# Patient Record
Sex: MALE | Race: WHITE | NOT HISPANIC OR LATINO | Employment: OTHER | URBAN - METROPOLITAN AREA
[De-identification: names, ages, dates, MRNs, and addresses within clinical notes are randomized per-mention and may not be internally consistent; named-entity substitution may affect disease eponyms.]

---

## 2017-06-02 ENCOUNTER — HOSPITAL ENCOUNTER (OUTPATIENT)
Dept: LAB | Facility: HOSPITAL | Age: 82
Discharge: HOME | End: 2017-06-02
Attending: RADIOLOGY | Admitting: RADIOLOGY

## 2017-06-02 LAB
PSA SERPL-MCNC: 0.01 NG/ML (ref 0–4)
TESTOST SERPL-MCNC: 3.51 NG/ML (ref 1.7–7.8)

## 2017-09-06 ENCOUNTER — HOSPITAL ENCOUNTER (OUTPATIENT)
Dept: OTHER | Facility: HOSPITAL | Age: 82
Setting detail: SPECIMEN
Discharge: HOME | End: 2017-09-06
Attending: FAMILY MEDICINE | Admitting: FAMILY MEDICINE

## 2017-12-01 ENCOUNTER — HOSPITAL ENCOUNTER (OUTPATIENT)
Dept: LAB | Facility: HOSPITAL | Age: 82
Discharge: HOME | End: 2017-12-01
Attending: RADIOLOGY | Admitting: RADIOLOGY

## 2017-12-01 LAB
PSA SERPL-MCNC: 0.01 NG/ML (ref 0–4)
TESTOST SERPL-MCNC: 4.7 NG/ML (ref 1.7–7.8)

## 2018-06-08 ENCOUNTER — HOSPITAL ENCOUNTER (OUTPATIENT)
Dept: LAB | Facility: HOSPITAL | Age: 83
Discharge: HOME | End: 2018-06-08
Attending: RADIOLOGY | Admitting: RADIOLOGY

## 2018-06-08 LAB
BASOPHILS # BLD AUTO: 0.1 10*3/UL (ref 0–0.2)
BASOPHILS NFR BLD AUTO: 1 % (ref 0–2)
CHOLEST SERPL-MCNC: 176 MG/DL
CHOLEST/HDLC SERPL: 3.1 {RATIO}
CONV LDL CHOLESTEROL DIRECT: 96 MG/DL (ref 0–100)
DIFFERENTIAL METHOD BLD: (no result)
EOSINOPHIL # BLD AUTO: 0.2 10*3/UL (ref 0–0.3)
EOSINOPHIL # BLD AUTO: 4 % (ref 0–3)
ERYTHROCYTE [DISTWIDTH] IN BLOOD BY AUTOMATED COUNT: 12.9 % (ref 11.5–14.5)
HCT VFR BLD AUTO: 46 % (ref 40–54)
HDLC SERPL-MCNC: 56 MG/DL
HGB BLD-MCNC: 15.8 G/DL (ref 14–18)
LDLC/HDLC SERPL: 1.7 {RATIO}
LIPID INTERPRETATION: ABNORMAL
LYMPHOCYTES # BLD AUTO: 0.9 10*3/UL (ref 0.8–4.8)
LYMPHOCYTES NFR BLD AUTO: 19 % (ref 18–42)
MAGNESIUM SERPL-MCNC: 2 MG/DL (ref 1.8–2.5)
MCH RBC QN AUTO: 32.2 PG (ref 26–32)
MCHC RBC AUTO-ENTMCNC: 34.3 G/DL (ref 32–36)
MCV RBC AUTO: 93.9 FL (ref 80–94)
MONOCYTES # BLD AUTO: 0.5 10*3/UL (ref 0.1–1.3)
MONOCYTES NFR BLD AUTO: 11 % (ref 2–11)
NEUTROPHILS # BLD AUTO: 3.1 10*3/UL (ref 2.3–8.6)
NEUTROPHILS NFR BLD AUTO: 65 % (ref 50–75)
NRBC BLD AUTO-RTO: 0 /100{WBCS}
NRBC/RBC NFR BLD MANUAL: 0 10*3/UL
PLATELET # BLD AUTO: 214 10*3/UL (ref 150–450)
PMV BLD AUTO: 7.6 FL (ref 7.4–10.4)
RBC # BLD AUTO: 4.9 10*6/UL (ref 4.6–6)
TRIGL SERPL-MCNC: 107 MG/DL
VLDLC SERPL CALC-MCNC: 23.6 MG/DL
WBC # BLD AUTO: 4.8 10*3/UL (ref 4.5–11.5)

## 2018-12-07 ENCOUNTER — HOSPITAL ENCOUNTER (OUTPATIENT)
Dept: LAB | Facility: HOSPITAL | Age: 83
Discharge: HOME | End: 2018-12-07
Attending: RADIOLOGY | Admitting: RADIOLOGY

## 2018-12-07 LAB
PSA SERPL-MCNC: 0.01 NG/ML (ref 0–4)
TESTOST SERPL-MCNC: 4.12 NG/ML (ref 1.7–7.8)

## 2019-04-01 ENCOUNTER — HOSPITAL ENCOUNTER (OUTPATIENT)
Dept: LAB | Facility: HOSPITAL | Age: 84
Discharge: HOME | End: 2019-04-01
Attending: FAMILY MEDICINE | Admitting: FAMILY MEDICINE

## 2019-04-01 LAB
ALBUMIN SERPL-MCNC: 3.8 G/DL (ref 3.5–4.8)
ALBUMIN/GLOB SERPL: 1.3 {RATIO} (ref 1–1.7)
ALP SERPL-CCNC: 57 IU/L (ref 32–91)
ALT SERPL-CCNC: 23 IU/L (ref 17–63)
ANION GAP SERPL CALC-SCNC: 14.4 MMOL/L (ref 10–20)
AST SERPL-CCNC: 23 IU/L (ref 15–41)
BASOPHILS # BLD AUTO: 0.1 10*3/UL (ref 0–0.2)
BASOPHILS NFR BLD AUTO: 2 % (ref 0–2)
BILIRUB SERPL-MCNC: 1.1 MG/DL (ref 0.3–1.2)
BUN SERPL-MCNC: 24 MG/DL (ref 8–20)
BUN/CREAT SERPL: 20 (ref 6.2–20.3)
CALCIUM SERPL-MCNC: 9.2 MG/DL (ref 8.9–10.3)
CHLORIDE SERPL-SCNC: 100 MMOL/L (ref 101–111)
CHOLEST SERPL-MCNC: 180 MG/DL
CHOLEST/HDLC SERPL: 3.2 {RATIO}
CONV CO2: 27 MMOL/L (ref 22–32)
CONV LDL CHOLESTEROL DIRECT: 113 MG/DL (ref 0–100)
CONV TOTAL PROTEIN: 6.8 G/DL (ref 6.1–7.9)
CREAT UR-MCNC: 1.2 MG/DL (ref 0.7–1.2)
DIFFERENTIAL METHOD BLD: (no result)
EOSINOPHIL # BLD AUTO: 0.1 10*3/UL (ref 0–0.3)
EOSINOPHIL # BLD AUTO: 3 % (ref 0–3)
ERYTHROCYTE [DISTWIDTH] IN BLOOD BY AUTOMATED COUNT: 13 % (ref 11.5–14.5)
GLOBULIN UR ELPH-MCNC: 3 G/DL (ref 2.5–3.8)
GLUCOSE SERPL-MCNC: 88 MG/DL (ref 65–99)
HCT VFR BLD AUTO: 48.5 % (ref 40–54)
HDLC SERPL-MCNC: 57 MG/DL
HGB BLD-MCNC: 16.7 G/DL (ref 14–18)
LDLC/HDLC SERPL: 2 {RATIO}
LIPID INTERPRETATION: ABNORMAL
LYMPHOCYTES # BLD AUTO: 1.1 10*3/UL (ref 0.8–4.8)
LYMPHOCYTES NFR BLD AUTO: 21 % (ref 18–42)
MCH RBC QN AUTO: 33 PG (ref 26–32)
MCHC RBC AUTO-ENTMCNC: 34.4 G/DL (ref 32–36)
MCV RBC AUTO: 95.8 FL (ref 80–94)
MONOCYTES # BLD AUTO: 0.5 10*3/UL (ref 0.1–1.3)
MONOCYTES NFR BLD AUTO: 10 % (ref 2–11)
NEUTROPHILS # BLD AUTO: 3.4 10*3/UL (ref 2.3–8.6)
NEUTROPHILS NFR BLD AUTO: 64 % (ref 50–75)
NRBC BLD AUTO-RTO: 0 /100{WBCS}
NRBC/RBC NFR BLD MANUAL: 0 10*3/UL
PLATELET # BLD AUTO: 247 10*3/UL (ref 150–450)
PMV BLD AUTO: 7.8 FL (ref 7.4–10.4)
POTASSIUM SERPL-SCNC: 4.4 MMOL/L (ref 3.6–5.1)
RBC # BLD AUTO: 5.07 10*6/UL (ref 4.6–6)
SODIUM SERPL-SCNC: 137 MMOL/L (ref 136–144)
TRIGL SERPL-MCNC: 112 MG/DL
VLDLC SERPL CALC-MCNC: 10.2 MG/DL
WBC # BLD AUTO: 5.2 10*3/UL (ref 4.5–11.5)

## 2019-05-28 ENCOUNTER — CONVERSION ENCOUNTER (OUTPATIENT)
Dept: CARDIOLOGY | Facility: CLINIC | Age: 84
End: 2019-05-28

## 2019-06-01 ENCOUNTER — TRANSCRIBE ORDERS (OUTPATIENT)
Dept: ADMINISTRATIVE | Facility: HOSPITAL | Age: 84
End: 2019-06-01

## 2019-06-01 DIAGNOSIS — Z13.6 ENCOUNTER FOR SCREENING FOR VASCULAR DISEASE: Primary | ICD-10-CM

## 2019-06-04 VITALS
BODY MASS INDEX: 23.55 KG/M2 | HEART RATE: 60 BPM | SYSTOLIC BLOOD PRESSURE: 137 MMHG | WEIGHT: 141.5 LBS | OXYGEN SATURATION: 97 % | DIASTOLIC BLOOD PRESSURE: 82 MMHG

## 2019-06-06 NOTE — PROGRESS NOTES
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  Denies having any headache ,abdominal pain ,nausea, vomiting , diarrhea constipation, loss of weight or loss of appetite.    Denies having any excessive bruising ,hematuria or blood in the stool.    Review of all systems negative except as indicated  HPI [[[[[[[[[[[[[[[[[[[[[[[[   impression  ====================  - Tachy-lolis syndrome.  History of atrial flutter.  Patient is maintaining sinus rhythm    Status post electrical cardioversion and patient had significant sinus pauses after cardioversion in May of 2006. Patient had significant  sinus bradycardia and syncope and Toprol was discontinued with improvement of symptoms.     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  Minimal mitral and aortic regurgitation    - Chronic and incomplete right bundle-branch block      -Elevated blood pressure 150/90 today.  It was 135/70 yesterday home.     -History of allergy to horse serum     -Status post hernia repair and right knee surgery.    ====================  Plan  =================   EKG showed sinus bradycardia first-degree AV block left ventricular hypertrophy  Patient is not having any angina pectoris or congestive heart failure.     Medications were reviewed and updated.    Followup in the office in  6 months  [[[[[[[[[[[[[[[[[[[[[[[[[[[[[[[[[[      Vital Signs:    Patient Profile:    83 Years Old Male  CC:         atrial flutter 6mo f/u   Height:     65 inches  Weight:     141.50 pounds  (Measured Weight:  141 lbs. 8 oz.)  BMI:        23.54  Pulse rate: 60 / minute  O2 Sat:     97 %    BP sittin / 82  (left arm)  Cuff size:  regular   Vitals Entered By: Rachale Cole CMA (May 28, 2019 2:46 PM)    Medications:  Medications were reviewed with  the patient during this visit.    Allergies:   * HORSE SERUM (Critical)    Allergies were reviewed with the patient during this visit.    Active Medications (reviewed today):  TAMSULOSIN HCL 0.4 MG ORAL CAPSULE (TAMSULOSIN HCL) take 1 tab daily      VITAMIN C ER 1000 MG ORAL TABLET EXTENDED RELEASE (ASCORBIC ACID) take 1 tab by mouth daily      MULTIVITAMINS TABS (MULTIPLE VITAMIN) Take one by mouth daily      GINKOBA TABLET (GINKGO BILOBA TABS) take 1 tab (60mg) by mouth daily      [facility] GLUCOSAMINE-CHONDROITIN TABLET (GLUCOSAMINE-CHONDROITIN TABS) take 1 tab by mouth daily    Current Allergies (reviewed today):  * HORSE SERUM (Critical)    Current Medications (including medications started today):   TAMSULOSIN HCL 0.4 MG ORAL CAPSULE (TAMSULOSIN HCL) take 1 tab daily      VITAMIN C ER 1000 MG ORAL TABLET EXTENDED RELEASE (ASCORBIC ACID) take 1 tab by mouth daily      MULTIVITAMINS TABS (MULTIPLE VITAMIN) Take one by mouth daily      GINKOBA TABLET (GINKGO BILOBA TABS) take 1 tab (60mg) by mouth daily      [employer]S GLUCOSAMINE-CHONDROITIN TABLET (GLUCOSAMINE-CHONDROITIN TABS) take 1 tab by mouth daily      Past Medical History:     Reviewed history from 01/17/2014 and no changes required:        Prostate cancer        Atrial flutter s/p cardioversion        Tachy-lolis syndrome with history of syncope        BPH            Past Surgical History:     Reviewed history from 03/26/2018 and no changes required:        Hernia    Family History Summary:      Reviewed history Last on 11/26/2018 and no changes required:05/28/2019  Mother - Has Family History of Lung Cancer - Entered On: 11/2/2015  Uncle - Has Family History of Stroke - Entered On: 11/2/2015  Uncle - Has Family History of High Cholesterol - Entered On: 11/2/2015  Aunt - Has Family History of High Cholesterol - Entered On: 11/2/2015  Father - Has Family History of High Cholesterol - Entered On: 11/2/2015    General Comments - FH:  FH Lung Cancer - Mother  FH AAA -  Father, Paternal Uncle, Aunt  FH atherosclerosis    No colon cancer      Social History:     Reviewed history from 11/26/2018 and no changes required:        Patient has never smoked.        Passive Smoke: N        Alcohol Use: Y        Drug Use: N        HIV/High Risk: N        Regular Exercise: Y            Social History Summary:  Patient has never smoked.  Passive Smoke: N  Alcohol Use: Y  Drug Use: N  HIV/High Risk: N  Regular Exercise: Y  Social History Reviewed: 05/28/2019          Risk Factors:     Smoked Tobacco Use:  Never smoker  Smokeless Tobacco Use:  Never  Passive smoke exposure:  no  Drug use:  no  HIV high-risk behavior:  no  Caffeine use:  2 drinks per day  Alcohol use:  yes     Type:  4-5 drinks weekly     Drinks per day:  social  Exercise:  yes     Times per week:  3     Type of Exercise:  running 3 miles  Seatbelt use:  100 %  Sun Exposure:  frequently    Family History Risk Factors:     Family History of MI in females < 65 years old:  no     Family History of MI in males < 55 years old:  yes        Review of Systems   General: No fatigue or tiredness, No change in weight   Eyes: No redness  Cardiovascular: No chest pain, no palpitations  Respiratory: No shortness of breath  Gastrointestinal: No nausea or vomiting, bleeding  Genitourinary: no hematuria or dysuria  Musculoskeletal: No arthralgia or myalgia  Skin: No rash, no edema   Neurologic: No numbness, tingling, no syncope  Hematologic/Lymphatic: No abnormal bleeding      Physical Exam    General:      The patient is alert, oriented and in no distress.    Vital signs as noted above.    Head and neck revealed no carotid bruits or jugular venous distension.  No thyromegaly or lymphadenopathy is present.    Lungs clear.  No wheezing.  Breath sounds are normal bilaterally.    Heart normal first and second heart sounds.  No murmur or pericardial rub is present.  No gallop is present.    Abdomen soft and nontender.  No organomegaly is  present.    Extremities revealed good peripheral pulses without any pedal edema.    Skin warm and dry.    Musculoskeletal system is grossly normal.    CNS grossly normal.      Blood Pressure:  Today's BP: 137/82 mm Hg    Labwork:   Most Recent Lab Results:   LDL: 113 mg/dL 04/01/2019      EKG Interpretation   Comments: Sinus bradycardia first-degree AV block nonspecific ST-T changes incomplete right bundle-branch block left ventricle hypertrophy no ectopy      Impression & Recommendations:    Problem # 1:  Left ventricular hypertrophy (ICD-429.3) (AIA91-M92.7)  Assessment: Unchanged    Orders:  96879-Hys Vst-Est Level IV (CPT-01532)      Problem # 2:  Hx of ATRIAL FLUTTER (ICD-427.32) (VGU99-X48.92)  Assessment: Improved    Orders:  EKG (In House) (23617)  16469-Khs Vst-Est Level IV (CPT-80737)      Problem # 3:  BRADYCARDIA-TACHYCARDIA SYNDROME (ICD-427.81) (BPO32-D80.5)  Assessment: Improved    Orders:  EKG (In House) (33447)  04222-Pqp Vst-Est Level IV (CPT-82935)      Problem # 4:  Hypertension, borderline (ICD-401.9) (KBY20-N08)  Assessment: Improved    Orders:  08092-Fxk Vst-Est Level IV (CPT-83667)      Problem # 5:  Right bundle branch block (ICD-426.4) (HLR71-T42.10)  Assessment: Unchanged    Orders:  EKG (In House) (15423)  70233-Fvo Vst-Est Level IV (CPT-75655)      Problem # 6:  Syncope (ICD-780.2) (KAV79-R64)  Assessment: Improved    Orders:  34915-Sbc Vst-Est Level IV (CPT-79168)        Patient Instructions:  1)   followup in 6 months                Medication Administration    Orders Added:  1)  EKG (In House) [08848]  2)  44497-Dta Vst-Est Level IV [CPT-59998]  ]      Electronically signed by Calvin Anderson MD on 05/28/2019 at 3:05 PM  ________________________________________________________________________       Disclaimer: Converted Note message may not contain all data elements that existed in the legacy source system. Please see [employer] for the original note details.

## 2019-06-11 ENCOUNTER — HOSPITAL ENCOUNTER (OUTPATIENT)
Dept: LAB | Facility: HOSPITAL | Age: 84
Discharge: HOME | End: 2019-06-11
Attending: RADIOLOGY | Admitting: RADIOLOGY

## 2019-06-11 LAB
PSA SERPL-MCNC: 0 NG/ML (ref 0–4)
TESTOST SERPL-MCNC: 4.16 NG/ML (ref 1.7–7.8)

## 2019-07-18 ENCOUNTER — HOSPITAL ENCOUNTER (OUTPATIENT)
Dept: CARDIOLOGY | Facility: HOSPITAL | Age: 84
Discharge: HOME | End: 2019-07-18
Admitting: FAMILY MEDICINE

## 2019-07-18 DIAGNOSIS — Z13.6 ENCOUNTER FOR SCREENING FOR VASCULAR DISEASE: ICD-10-CM

## 2019-07-18 LAB
BH CV XLRA MEAS - PAD LEFT ABI PT: 1.19
BH CV XLRA MEAS - PAD RIGHT ABI PT: 1.23
BH CV XLRA MEAS - PROX AO DIAM: 1.7 CM
BH CV XLRA MEAS LEFT ICA/CCA RATIO: 1
BH CV XLRA MEAS RIGHT ICA/CCA RATIO: 0.9

## 2019-07-18 PROCEDURE — 93799 UNLISTED CV SVC/PROCEDURE: CPT

## 2019-07-23 ENCOUNTER — TELEPHONE (OUTPATIENT)
Dept: FAMILY MEDICINE CLINIC | Facility: CLINIC | Age: 84
End: 2019-07-23

## 2019-11-25 ENCOUNTER — OFFICE VISIT (OUTPATIENT)
Dept: CARDIOLOGY | Facility: CLINIC | Age: 84
End: 2019-11-25

## 2019-11-25 VITALS
DIASTOLIC BLOOD PRESSURE: 76 MMHG | BODY MASS INDEX: 22.02 KG/M2 | WEIGHT: 137 LBS | OXYGEN SATURATION: 95 % | SYSTOLIC BLOOD PRESSURE: 118 MMHG | HEIGHT: 66 IN | HEART RATE: 58 BPM

## 2019-11-25 DIAGNOSIS — R00.1 BRADYCARDIA, SINUS: ICD-10-CM

## 2019-11-25 DIAGNOSIS — I10 ESSENTIAL HYPERTENSION: ICD-10-CM

## 2019-11-25 DIAGNOSIS — I49.5 TACHYCARDIA-BRADYCARDIA (HCC): Primary | ICD-10-CM

## 2019-11-25 DIAGNOSIS — I48.92 ATRIAL FLUTTER WITH CONTROLLED RESPONSE (HCC): ICD-10-CM

## 2019-11-25 PROCEDURE — 93000 ELECTROCARDIOGRAM COMPLETE: CPT | Performed by: INTERNAL MEDICINE

## 2019-11-25 PROCEDURE — 99214 OFFICE O/P EST MOD 30 MIN: CPT | Performed by: INTERNAL MEDICINE

## 2019-11-25 NOTE — PROGRESS NOTES
Encounter Date:11/25/2019  Last seen 5/28/2019      Patient ID: Henok Beasley is a 84 y.o. male.    Chief Complaint:  Follow-up  Tachycardia bradycardia syndrome  History of atrial flutter.  History of sinus bradycardia  Elevated blood pressure.  Incomplete right bundle branch block      History of Present Illness    Since I have last seen, the patient has been without any chest discomfort ,shortness of breath, palpitations, dizziness or syncope.  Denies having any headache ,abdominal pain ,nausea, vomiting , diarrhea constipation, loss of weight or loss of appetite.  Denies having any excessive bruising ,hematuria or blood in the stool.    Review of all systems negative except as indicated  Assessment and Plan       [[[[[[[[[[[[[[[[[[[[[[[[   impression  ====================  - Tachy-lolis syndrome.  History of atrial flutter.  Patient is maintaining sinus rhythm     Status post electrical cardioversion and patient had significant sinus pauses after cardioversion in May of 2006. Patient had significant  sinus bradycardia and syncope and Toprol was discontinued with improvement of symptoms.      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  Minimal mitral and aortic regurgitation     - Chronic and incomplete right bundle-branch block       -Elevated blood pressure 150/90 today.  It was 135/70 yesterday home.      -History of allergy to horse serum      -Status post hernia repair and right knee surgery.     ====================  Plan  =================   EKG showed sinus bradycardia first-degree AV block left ventricular hypertrophy  Patient is not having any angina pectoris or congestive heart failure.     Medications were reviewed and updated.    Followup in the office in  6 months  [[[[[[[[[[[[[[[[[[[[[[[[[[[[[[[[[[           Diagnosis Plan   1. Tachycardia-bradycardia (CMS/HCC)  ECG 12 Lead   2. Atrial flutter with controlled response (CMS/HCC)  ECG 12 Lead   3. Bradycardia, sinus  ECG 12 Lead    4. Essential hypertension  ECG 12 Lead   LAB RESULTS (LAST 7 DAYS)    CBC        BMP        CMP         BNP        TROPONIN        CoAg        Creatinine Clearance  CrCl cannot be calculated (Patient's most recent lab result is older than the maximum 30 days allowed.).    ABG        Radiology  No radiology results for the last day                The following portions of the patient's history were reviewed and updated as appropriate: allergies, current medications, past family history, past medical history, past social history, past surgical history and problem list.    Review of Systems   Constitution: Negative for fever and malaise/fatigue.   HENT: Negative for congestion and hearing loss.    Eyes: Negative for double vision and visual disturbance.   Cardiovascular: Negative for chest pain, claudication, dyspnea on exertion, leg swelling and syncope.   Respiratory: Negative for cough and shortness of breath.    Endocrine: Negative for cold intolerance.   Skin: Negative for color change and rash.   Musculoskeletal: Negative for arthritis and joint pain.   Gastrointestinal: Negative for abdominal pain and heartburn.   Genitourinary: Negative for hematuria.   Neurological: Negative for excessive daytime sleepiness and dizziness.   Psychiatric/Behavioral: Negative for depression. The patient is not nervous/anxious.    All other systems reviewed and are negative.        Current Outpatient Medications:   •  Ascorbic Acid (VITAMIN C ER) 1000 MG tablet controlled-release, VITAMIN C ER 1000 MG ORAL TABLET EXTENDED RELEASE, Disp: , Rfl:   •  atovaquone-proguanil (MALARONE) 250-100 MG tablet per tablet, MALARONE 250-100 MG TABS, Disp: , Rfl:   •  Ginkgo Biloba 120 MG tablet, GINKOBA TABS, Disp: , Rfl:   •  Glucosamine-Chondroitin 250-200 MG tablet, [facility]S GLUCOSAMINE-CHONDROITIN TABS, Disp: , Rfl:   •  MULTIPLE VITAMIN-FOLIC ACID PO, MULTIVITAMINS TABS, Disp: , Rfl:   •  tamsulosin (FLOMAX) 0.4 MG capsule 24 hr capsule,  "TAMSULOSIN HCL 0.4 MG CAPS, Disp: , Rfl:   •  triamcinolone (KENALOG) 0.1 % cream, Apply  topically to the appropriate area as directed 3 (Three) Times a Day As Needed for Rash., Disp: 80 g, Rfl: 0    Allergies   Allergen Reactions   • Other Rash     Horse Serum       Family History   Problem Relation Age of Onset   • Cancer Mother        Past Surgical History:   Procedure Laterality Date   • HERNIA REPAIR     • TONSILLECTOMY         History reviewed. No pertinent past medical history.    Family History   Problem Relation Age of Onset   • Cancer Mother        Social History     Socioeconomic History   • Marital status:      Spouse name: Not on file   • Number of children: Not on file   • Years of education: Not on file   • Highest education level: Not on file   Tobacco Use   • Smoking status: Never Smoker   • Smokeless tobacco: Never Used   Substance and Sexual Activity   • Alcohol use: Yes   • Drug use: No   • Sexual activity: Defer           ECG 12 Lead  Date/Time: 11/25/2019 1:37 PM  Performed by: Calvin Anderson MD  Authorized by: Calvin Anderson MD   Comparison: compared with previous ECG   Similar to previous ECG  Comments: Sinus bradycardia first-degree AV block nonspecific ST-T wave changes left ventricular hypertrophy 46/min nonspecific ST-T wave abnormalities no ectopy no change from 5/28/2019              Objective:       Physical Exam    /76 (BP Location: Right arm, Patient Position: Sitting, Cuff Size: Adult)   Pulse 58   Ht 167.6 cm (66\")   Wt 62.1 kg (137 lb)   SpO2 95%   BMI 22.11 kg/m²   The patient is alert, oriented and in no distress.    Vital signs as noted above.    Head and neck revealed no carotid bruits or jugular venous distension.  No thyromegaly or lymphadenopathy is present.    Lungs clear.  No wheezing.  Breath sounds are normal bilaterally.    Heart normal first and second heart sounds.  No murmur..  No pericardial rub is present.  No gallop is present.    Abdomen " soft and nontender.  No organomegaly is present.    Extremities revealed good peripheral pulses without any pedal edema.    Skin warm and dry.    Musculoskeletal system is grossly normal.    CNS grossly normal.

## 2019-12-10 ENCOUNTER — LAB (OUTPATIENT)
Dept: LAB | Facility: HOSPITAL | Age: 84
End: 2019-12-10

## 2019-12-10 ENCOUNTER — TRANSCRIBE ORDERS (OUTPATIENT)
Dept: ADMINISTRATIVE | Facility: HOSPITAL | Age: 84
End: 2019-12-10

## 2019-12-10 DIAGNOSIS — Z85.46 HISTORY OF PROSTATE CANCER: ICD-10-CM

## 2019-12-10 DIAGNOSIS — Z85.46 HISTORY OF PROSTATE CANCER: Primary | ICD-10-CM

## 2019-12-10 LAB
PSA SERPL-MCNC: <0.014 NG/ML (ref 0–4)
TESTOST SERPL-MCNC: 646 NG/DL (ref 193–740)

## 2019-12-10 PROCEDURE — 36415 COLL VENOUS BLD VENIPUNCTURE: CPT

## 2019-12-10 PROCEDURE — 84403 ASSAY OF TOTAL TESTOSTERONE: CPT

## 2019-12-10 PROCEDURE — 84153 ASSAY OF PSA TOTAL: CPT

## 2020-03-10 ENCOUNTER — OFFICE VISIT (OUTPATIENT)
Dept: FAMILY MEDICINE CLINIC | Facility: CLINIC | Age: 85
End: 2020-03-10

## 2020-03-10 VITALS
TEMPERATURE: 97.7 F | DIASTOLIC BLOOD PRESSURE: 70 MMHG | SYSTOLIC BLOOD PRESSURE: 132 MMHG | WEIGHT: 140 LBS | HEIGHT: 66 IN | HEART RATE: 76 BPM | RESPIRATION RATE: 16 BRPM | BODY MASS INDEX: 22.5 KG/M2

## 2020-03-10 DIAGNOSIS — N40.0 ENLARGED PROSTATE WITHOUT LOWER URINARY TRACT SYMPTOMS (LUTS): ICD-10-CM

## 2020-03-10 DIAGNOSIS — K21.9 GASTROESOPHAGEAL REFLUX DISEASE, ESOPHAGITIS PRESENCE NOT SPECIFIED: ICD-10-CM

## 2020-03-10 DIAGNOSIS — N52.9 IMPOTENCE OF ORGANIC ORIGIN: ICD-10-CM

## 2020-03-10 DIAGNOSIS — M51.36 DDD (DEGENERATIVE DISC DISEASE), LUMBAR: ICD-10-CM

## 2020-03-10 DIAGNOSIS — I48.92 ATRIAL FLUTTER, UNSPECIFIED TYPE (HCC): Primary | ICD-10-CM

## 2020-03-10 DIAGNOSIS — Z00.00 PREVENTATIVE HEALTH CARE: ICD-10-CM

## 2020-03-10 DIAGNOSIS — Z12.5 ENCOUNTER FOR SCREENING FOR MALIGNANT NEOPLASM OF PROSTATE: ICD-10-CM

## 2020-03-10 DIAGNOSIS — E55.9 VITAMIN D DEFICIENCY, UNSPECIFIED: ICD-10-CM

## 2020-03-10 DIAGNOSIS — C61 PROSTATE CANCER (HCC): ICD-10-CM

## 2020-03-10 PROBLEM — R41.3 MEMORY LOSS: Status: ACTIVE | Noted: 2018-03-26

## 2020-03-10 PROBLEM — I49.5 SICK SINUS SYNDROME: Status: ACTIVE | Noted: 2020-03-10

## 2020-03-10 PROBLEM — Z85.828 HISTORY OF MALIGNANT NEOPLASM OF SKIN: Status: ACTIVE | Noted: 2020-03-10

## 2020-03-10 PROCEDURE — 99214 OFFICE O/P EST MOD 30 MIN: CPT | Performed by: FAMILY MEDICINE

## 2020-03-10 NOTE — PROGRESS NOTES
Chief Complaint   Patient presents with   • Get established     New pt     HPI  Henok Beasley is a 84 y.o. male that presents for to establish care and discuss multiple health concners    Atrial flutter: follows w/ cardiology- Dr Anderson. S/p cardioversion. Not currently on any medication per patient preference. Predominantly sinus arrhythmia/bradycardia. Not on blood thinner per patient preference    Prostate cancer: diagnosed 2010. Treated w/ radioactive seed placement. Follows w/ urology every 6 months. In remission    BPH: taking tamsulosin 0.4mg every morning. Still getting up 2-3 times per night to urinate.    Low back pain/OA: hx spinal stenosis and DDD. Managed w/ exercise for last 2-3 years w/ good results. Not maintained on any medicines.     GERD: endorses acid reflux to the back of his mouth when he lies down at night. Denies heartburn. Treating w/ sip of water at night PRN. No medications.    ED: related to prostate cancer treatment. Has been treated w/ Cialis/Viagra w/ modest results. Has now opted for vacuum device, which is working well.    Patient feels that he is eating more to maintain his weight. Weight has been stable for last 60 years (I only have documentation of last 2)    Review of Systems   Constitutional: Negative for chills, fever and unexpected weight loss.   HENT: Negative for congestion, rhinorrhea and sore throat.    Eyes: Negative for visual disturbance.   Respiratory: Negative for cough and shortness of breath.    Cardiovascular: Negative for chest pain and palpitations.   Gastrointestinal: Negative for abdominal pain, constipation, diarrhea, nausea and vomiting.   Genitourinary: Negative for difficulty urinating and dysuria.   Musculoskeletal: Negative for arthralgias and joint swelling.   Skin: Negative for rash and skin lesions.   Neurological: Negative for weakness and headache.   Psychiatric/Behavioral: Negative for depressed mood. The patient is not nervous/anxious.      The  following portions of the patient's history were reviewed and updated as appropriate: problem list, past medical history, past surgical history, allergies, current medications, past social history and past family history.    Problem List Tab  Patient History Tab  Immunizations Tab  Medications Tab  Chart Review Tab  Care Everywhere Tab  Synopsis Tab    PE  Vitals:    03/10/20 1043   BP: 132/70   Pulse: 76   Resp: 16   Temp: 97.7 °F (36.5 °C)     Body mass index is 22.6 kg/m².  General: Well nourished, NAD  Head: AT/NC  Eyes: EOMI, anicteric sclera  ENT: MMM w/o erythema  Neck: Supple, no thyromegaly or LAD. No carotid bruit  Resp: CTAB, SCR, BS equal  CV: RRR w/o m/r/g; 2+ pulses  GI: Soft, NT/ND, +BS  MSK: FROM, no deformity, no edema  Skin: Warm, dry, intact  Neuro: Alert and oriented. No focal deficits  Psych: Appropriate mood and affect    Imaging  No Images in the past 120 days found..    Assessment/Plan   Henok Beasley is a 84 y.o. male that presents for   Chief Complaint   Patient presents with   • Get established     New pt     Henok was seen today for get established.    Diagnoses and all orders for this visit:    Atrial flutter, unspecified type (CMS/[facility]): currently NSR   - Cont cardiology f/u- Dr Anderson   - Monitor    Prostate cancer (CMS/HCC): s/p radiation, now in remission   - Cont f/u w/ urology    Enlarged prostate without lower urinary tract symptoms (luts): holds diagnosis of BPH although s/p radiation for prostate cancer   - Cont tamsulosin 0.4mg daily    Gastroesophageal reflux disease, esophagitis presence not specified: not maintained on any medications   - Monitor    DDD (degenerative disc disease), lumbar   - Recommend Tylenol/ibuprofen PRN    Preventative health care  -     CBC & Differential  -     Comprehensive Metabolic Panel  -     Hemoglobin A1c  -     Lipid Panel  -     PSA Screen  -     T4, Free  -     TSH  -     Vitamin D 25 Hydroxy    Encounter for screening for malignant neoplasm of  prostate   -     PSA Screen    Vitamin D deficiency, unspecified   -     Vitamin D 25 Hydroxy      Preventative:  Colonoscopy: 2016, due 2026  PSA: ordered today  DEXA: deferred per patient preference  Shingles: Completed  Pneumonia: Completed 2014  Tdap: Record requested (has had in last 10yrs)  Influenza: 2019    F/U in 1 year for Medicare Wellness visit

## 2020-06-10 ENCOUNTER — TRANSCRIBE ORDERS (OUTPATIENT)
Dept: ADMINISTRATIVE | Facility: HOSPITAL | Age: 85
End: 2020-06-10

## 2020-06-10 ENCOUNTER — LAB (OUTPATIENT)
Dept: LAB | Facility: HOSPITAL | Age: 85
End: 2020-06-10

## 2020-06-10 DIAGNOSIS — Z85.46 PERSONAL HISTORY OF PROSTATE CANCER: ICD-10-CM

## 2020-06-10 DIAGNOSIS — Z85.46 PERSONAL HISTORY OF PROSTATE CANCER: Primary | ICD-10-CM

## 2020-06-10 PROCEDURE — 84402 ASSAY OF FREE TESTOSTERONE: CPT

## 2020-06-10 PROCEDURE — 84403 ASSAY OF TOTAL TESTOSTERONE: CPT

## 2020-06-10 PROCEDURE — 84153 ASSAY OF PSA TOTAL: CPT

## 2020-06-10 PROCEDURE — 36415 COLL VENOUS BLD VENIPUNCTURE: CPT

## 2020-06-10 PROCEDURE — G0103 PSA SCREENING: HCPCS

## 2020-06-11 LAB — PSA SERPL-MCNC: <0.014 NG/ML (ref 0–4)

## 2020-06-12 LAB
TESTOST FREE SERPL-MCNC: 5.7 PG/ML (ref 6.6–18.1)
TESTOST SERPL-MCNC: 595 NG/DL (ref 264–916)

## 2020-09-22 ENCOUNTER — OFFICE VISIT (OUTPATIENT)
Dept: CARDIOLOGY | Facility: CLINIC | Age: 85
End: 2020-09-22

## 2020-09-22 VITALS
HEIGHT: 65 IN | WEIGHT: 138 LBS | BODY MASS INDEX: 22.99 KG/M2 | SYSTOLIC BLOOD PRESSURE: 140 MMHG | DIASTOLIC BLOOD PRESSURE: 92 MMHG | OXYGEN SATURATION: 97 % | HEART RATE: 49 BPM

## 2020-09-22 DIAGNOSIS — I10 ESSENTIAL HYPERTENSION: ICD-10-CM

## 2020-09-22 DIAGNOSIS — R00.1 BRADYCARDIA, SINUS: ICD-10-CM

## 2020-09-22 DIAGNOSIS — I49.5 TACHYCARDIA-BRADYCARDIA (HCC): Primary | ICD-10-CM

## 2020-09-22 DIAGNOSIS — I48.92 ATRIAL FLUTTER WITH CONTROLLED RESPONSE (HCC): ICD-10-CM

## 2020-09-22 PROCEDURE — 99213 OFFICE O/P EST LOW 20 MIN: CPT | Performed by: INTERNAL MEDICINE

## 2020-09-22 PROCEDURE — 93000 ELECTROCARDIOGRAM COMPLETE: CPT | Performed by: INTERNAL MEDICINE

## 2020-09-22 RX ORDER — TADALAFIL 20 MG/1
20 TABLET ORAL DAILY PRN
COMMUNITY
End: 2022-02-14 | Stop reason: SDUPTHER

## 2020-09-22 NOTE — PROGRESS NOTES
Encounter Date:09/22/2020  Last seen 11/25/2019      Patient ID: Henok Beasley is a 85 y.o. male.    Chief Complaint:  Follow-up  Tachycardia bradycardia syndrome  History of atrial flutter.  History of sinus bradycardia  Elevated blood pressure.  Incomplete right bundle branch block        History of Present Illness     Since I have last seen, the patient has been without any chest discomfort ,shortness of breath, palpitations, dizziness or syncope.  Denies having any headache ,abdominal pain ,nausea, vomiting , diarrhea constipation, loss of weight or loss of appetite.  Denies having any excessive bruising ,hematuria or blood in the stool.     Review of all systems negative except as indicated  Assessment and Plan         [[[[[[[[[[[[[[[[[[[[[[[[   impression  ====================  - Tachy-lolis syndrome.  History of atrial flutter.  Patient is maintaining sinus rhythm     Status post electrical cardioversion and patient had significant sinus pauses after cardioversion in May of 2006. Patient had significant  sinus bradycardia and syncope and Toprol was discontinued with improvement of symptoms.       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  Minimal mitral and aortic regurgitation     - Chronic and incomplete right bundle-branch block       -Elevated blood pressure 150/90 today.  It was 135/70 yesterday home.      -History of allergy to horse serum      -Status post hernia repair and right knee surgery.     ====================  Plan  =================  EKG showed sinus bradycardia first-degree AV block left ventricular hypertrophy  Patient is not having any angina pectoris or congestive heart failure.   Medications were reviewed and updated.  Followup in the office in  6 months.  Further plan will depend on patient's progress.  [[[[[[[[[[[[[[[[[[[[[[[[[[[[[[[[[[                Diagnosis Plan   1. Tachycardia-bradycardia (CMS/HCC)     2. Essential hypertension     3. Atrial flutter with  controlled response (CMS/formerly [facility])     4. Bradycardia, sinus     LAB RESULTS (LAST 7 DAYS)    CBC        BMP        CMP         BNP        TROPONIN        CoAg        Creatinine Clearance  CrCl cannot be calculated (Patient's most recent lab result is older than the maximum 30 days allowed.).    ABG        Radiology  No radiology results for the last day                The following portions of the patient's history were reviewed and updated as appropriate: allergies, current medications, past family history, past medical history, past social history, past surgical history and problem list.    Review of Systems   Constitution: Negative for fever and malaise/fatigue.   HENT: Negative for congestion and hearing loss.    Eyes: Negative for double vision and visual disturbance.   Cardiovascular: Positive for palpitations. Negative for chest pain, claudication, dyspnea on exertion, leg swelling and syncope.   Respiratory: Negative for cough and shortness of breath.    Endocrine: Negative for cold intolerance.   Skin: Negative for color change and rash.   Musculoskeletal: Negative for arthritis and joint pain.   Gastrointestinal: Negative for abdominal pain and heartburn.   Genitourinary: Negative for hematuria.   Neurological: Negative for excessive daytime sleepiness and dizziness.   Psychiatric/Behavioral: Negative for depression. The patient is not nervous/anxious.    All other systems reviewed and are negative.        Current Outpatient Medications:   •  Ascorbic Acid (VITAMIN C ER) 1000 MG tablet controlled-release, VITAMIN C ER 1000 MG ORAL TABLET EXTENDED RELEASE, Disp: , Rfl:   •  Ginkgo Biloba 120 MG tablet, GINKOBA TABS, Disp: , Rfl:   •  Glucosamine-Chondroitin 250-200 MG tablet, [facility]S GLUCOSAMINE-CHONDROITIN TABS, Disp: , Rfl:   •  MULTIPLE VITAMIN-FOLIC ACID PO, MULTIVITAMINS TABS, Disp: , Rfl:   •  tadalafil (Cialis) 20 MG tablet, Take 20 mg by mouth Daily As Needed for Erectile Dysfunction., Disp: , Rfl:   •   tamsulosin (FLOMAX) 0.4 MG capsule 24 hr capsule, TAMSULOSIN HCL 0.4 MG CAPS, Disp: , Rfl:     Allergies   Allergen Reactions   • Other Rash     Horse Serum       Family History   Problem Relation Age of Onset   • Cancer Mother    • Lung cancer Mother         smoker   • Hyperlipidemia Father    • Anuerysm Father         AAA- smoker   • Hyperlipidemia Other    • Aneurysm Other         AAA   • Hyperlipidemia Other    • Stroke Other    • Coronary artery disease Other    • Anuerysm Paternal Uncle         AAA   • Heart attack Daughter        Past Surgical History:   Procedure Laterality Date   • CATARACT EXTRACTION     • HERNIA REPAIR Right 1967, 2013    R inguinal   • KNEE SURGERY Right 1985    R meniscus repair   • MASTOIDECTOMY  1942   • MOHS SURGERY     • TONSILLECTOMY         Past Medical History:   Diagnosis Date   • Atrial flutter (CMS/HCC)     S/P CARDIOVERSION   • BPH without urinary obstruction    • DDD (degenerative disc disease), lumbar    • ED (erectile dysfunction) of organic origin    • GERD (gastroesophageal reflux disease)    • H/O tachycardia-bradycardia syndrome    • Left ventricular hypertrophy    • Memory loss    • Osteoarthritis    • Prostate cancer (CMS/HCC) 2010    radioactive seeds placed, in remission   • RBBB (right bundle branch block)    • Skin cancer     H/O SKIN CANCER   • Spinal stenosis, lumbar        Family History   Problem Relation Age of Onset   • Cancer Mother    • Lung cancer Mother         smoker   • Hyperlipidemia Father    • Anuerysm Father         AAA- smoker   • Hyperlipidemia Other    • Aneurysm Other         AAA   • Hyperlipidemia Other    • Stroke Other    • Coronary artery disease Other    • Anuerysm Paternal Uncle         AAA   • Heart attack Daughter        Social History     Socioeconomic History   • Marital status:      Spouse name: Not on file   • Number of children: Not on file   • Years of education: Not on file   • Highest education level: Not on file  "  Tobacco Use   • Smoking status: Never Smoker   • Smokeless tobacco: Never Used   Substance and Sexual Activity   • Alcohol use: Yes     Frequency: 4 or more times a week     Drinks per session: 1 or 2   • Drug use: No   • Sexual activity: Defer   Social History Narrative    Lives w/ wife of 62 years (2020).            ECG 12 Lead    Date/Time: 9/22/2020 2:27 PM  Performed by: Calvin Anderson MD  Authorized by: Calvin Anderson MD   Comparison: compared with previous ECG   Similar to previous ECG  Comparison to previous ECG: Sinus bradycardia first-degree AV block left ventricle hypertrophy nonspecific ST-T wave abnormalities abnormal ECG no change from 11/25/2019                Objective:       Physical Exam    /92   Pulse (!) 49   Ht 165.1 cm (65\")   Wt 62.6 kg (138 lb)   SpO2 97%   BMI 22.96 kg/m²   The patient is alert, oriented and in no distress.    Vital signs as noted above.    Head and neck revealed no carotid bruits or jugular venous distension.  No thyromegaly or lymphadenopathy is present.    Lungs clear.  No wheezing.  Breath sounds are normal bilaterally.    Heart normal first and second heart sounds.  No murmur..  No pericardial rub is present.  No gallop is present.    Abdomen soft and nontender.  No organomegaly is present.    Extremities revealed good peripheral pulses without any pedal edema.    Skin warm and dry.    Musculoskeletal system is grossly normal.    CNS grossly normal.    Unchanged from last visit.        "

## 2020-12-15 ENCOUNTER — LAB (OUTPATIENT)
Dept: LAB | Facility: HOSPITAL | Age: 85
End: 2020-12-15

## 2020-12-15 ENCOUNTER — TRANSCRIBE ORDERS (OUTPATIENT)
Dept: LAB | Facility: HOSPITAL | Age: 85
End: 2020-12-15

## 2020-12-15 DIAGNOSIS — Z85.46 PERSONAL HISTORY OF PROSTATE CANCER: Primary | ICD-10-CM

## 2020-12-15 DIAGNOSIS — Z85.46 PERSONAL HISTORY OF PROSTATE CANCER: ICD-10-CM

## 2020-12-15 LAB
25(OH)D3 SERPL-MCNC: 29 NG/ML (ref 30–100)
ALBUMIN SERPL-MCNC: 4.1 G/DL (ref 3.5–5.2)
ALBUMIN/GLOB SERPL: 1.5 G/DL
ALP SERPL-CCNC: 76 U/L (ref 39–117)
ALT SERPL W P-5'-P-CCNC: 25 U/L (ref 1–41)
ANION GAP SERPL CALCULATED.3IONS-SCNC: 6.2 MMOL/L (ref 5–15)
AST SERPL-CCNC: 26 U/L (ref 1–40)
BASOPHILS # BLD AUTO: 0.07 10*3/MM3 (ref 0–0.2)
BASOPHILS NFR BLD AUTO: 1.2 % (ref 0–1.5)
BILIRUB SERPL-MCNC: 0.5 MG/DL (ref 0–1.2)
BUN SERPL-MCNC: 18 MG/DL (ref 8–23)
BUN/CREAT SERPL: 16.7 (ref 7–25)
CALCIUM SPEC-SCNC: 9.4 MG/DL (ref 8.6–10.5)
CHLORIDE SERPL-SCNC: 100 MMOL/L (ref 98–107)
CHOLEST SERPL-MCNC: 169 MG/DL (ref 0–200)
CO2 SERPL-SCNC: 32.8 MMOL/L (ref 22–29)
CREAT SERPL-MCNC: 1.08 MG/DL (ref 0.76–1.27)
DEPRECATED RDW RBC AUTO: 40.5 FL (ref 37–54)
EOSINOPHIL # BLD AUTO: 0.12 10*3/MM3 (ref 0–0.4)
EOSINOPHIL NFR BLD AUTO: 2 % (ref 0.3–6.2)
ERYTHROCYTE [DISTWIDTH] IN BLOOD BY AUTOMATED COUNT: 11.7 % (ref 12.3–15.4)
GFR SERPL CREATININE-BSD FRML MDRD: 65 ML/MIN/1.73
GLOBULIN UR ELPH-MCNC: 2.7 GM/DL
GLUCOSE SERPL-MCNC: 90 MG/DL (ref 65–99)
HCT VFR BLD AUTO: 48.2 % (ref 37.5–51)
HDLC SERPL-MCNC: 60 MG/DL (ref 40–60)
HGB BLD-MCNC: 16.3 G/DL (ref 13–17.7)
IMM GRANULOCYTES # BLD AUTO: 0.02 10*3/MM3 (ref 0–0.05)
IMM GRANULOCYTES NFR BLD AUTO: 0.3 % (ref 0–0.5)
LDLC SERPL CALC-MCNC: 86 MG/DL (ref 0–100)
LDLC/HDLC SERPL: 1.37 {RATIO}
LYMPHOCYTES # BLD AUTO: 1.06 10*3/MM3 (ref 0.7–3.1)
LYMPHOCYTES NFR BLD AUTO: 17.8 % (ref 19.6–45.3)
MCH RBC QN AUTO: 32.1 PG (ref 26.6–33)
MCHC RBC AUTO-ENTMCNC: 33.8 G/DL (ref 31.5–35.7)
MCV RBC AUTO: 95.1 FL (ref 79–97)
MONOCYTES # BLD AUTO: 0.61 10*3/MM3 (ref 0.1–0.9)
MONOCYTES NFR BLD AUTO: 10.3 % (ref 5–12)
NEUTROPHILS NFR BLD AUTO: 4.06 10*3/MM3 (ref 1.7–7)
NEUTROPHILS NFR BLD AUTO: 68.4 % (ref 42.7–76)
NRBC BLD AUTO-RTO: 0 /100 WBC (ref 0–0.2)
PLATELET # BLD AUTO: 232 10*3/MM3 (ref 140–450)
PMV BLD AUTO: 9.8 FL (ref 6–12)
POTASSIUM SERPL-SCNC: 5 MMOL/L (ref 3.5–5.2)
PROT SERPL-MCNC: 6.8 G/DL (ref 6–8.5)
PSA SERPL-MCNC: <0.014 NG/ML (ref 0–4)
PSA SERPL-MCNC: <0.014 NG/ML (ref 0–4)
RBC # BLD AUTO: 5.07 10*6/MM3 (ref 4.14–5.8)
SODIUM SERPL-SCNC: 139 MMOL/L (ref 136–145)
T4 FREE SERPL-MCNC: 1.35 NG/DL (ref 0.93–1.7)
TRIGL SERPL-MCNC: 134 MG/DL (ref 0–150)
TSH SERPL DL<=0.05 MIU/L-ACNC: 1.24 UIU/ML (ref 0.27–4.2)
VLDLC SERPL-MCNC: 23 MG/DL (ref 5–40)
WBC # BLD AUTO: 5.94 10*3/MM3 (ref 3.4–10.8)

## 2020-12-15 PROCEDURE — 80061 LIPID PANEL: CPT | Performed by: FAMILY MEDICINE

## 2020-12-15 PROCEDURE — 84403 ASSAY OF TOTAL TESTOSTERONE: CPT

## 2020-12-15 PROCEDURE — 85025 COMPLETE CBC W/AUTO DIFF WBC: CPT | Performed by: FAMILY MEDICINE

## 2020-12-15 PROCEDURE — 80053 COMPREHEN METABOLIC PANEL: CPT | Performed by: FAMILY MEDICINE

## 2020-12-15 PROCEDURE — 83036 HEMOGLOBIN GLYCOSYLATED A1C: CPT | Performed by: FAMILY MEDICINE

## 2020-12-15 PROCEDURE — 82306 VITAMIN D 25 HYDROXY: CPT | Performed by: FAMILY MEDICINE

## 2020-12-15 PROCEDURE — 84439 ASSAY OF FREE THYROXINE: CPT | Performed by: FAMILY MEDICINE

## 2020-12-15 PROCEDURE — 36415 COLL VENOUS BLD VENIPUNCTURE: CPT | Performed by: FAMILY MEDICINE

## 2020-12-15 PROCEDURE — G0103 PSA SCREENING: HCPCS | Performed by: FAMILY MEDICINE

## 2020-12-15 PROCEDURE — 84153 ASSAY OF PSA TOTAL: CPT

## 2020-12-15 PROCEDURE — 84443 ASSAY THYROID STIM HORMONE: CPT | Performed by: FAMILY MEDICINE

## 2020-12-16 LAB
HBA1C MFR BLD: 5.3 % (ref 3.5–5.6)
TESTOST SERPL-MCNC: 603 NG/DL (ref 193–740)

## 2021-03-16 ENCOUNTER — OFFICE VISIT (OUTPATIENT)
Dept: FAMILY MEDICINE CLINIC | Facility: CLINIC | Age: 86
End: 2021-03-16

## 2021-03-16 VITALS
BODY MASS INDEX: 23.66 KG/M2 | OXYGEN SATURATION: 98 % | WEIGHT: 142 LBS | DIASTOLIC BLOOD PRESSURE: 72 MMHG | HEIGHT: 65 IN | RESPIRATION RATE: 16 BRPM | HEART RATE: 66 BPM | TEMPERATURE: 96.9 F | SYSTOLIC BLOOD PRESSURE: 122 MMHG

## 2021-03-16 DIAGNOSIS — Z00.00 MEDICARE ANNUAL WELLNESS VISIT, SUBSEQUENT: Primary | ICD-10-CM

## 2021-03-16 DIAGNOSIS — N40.0 BPH WITHOUT URINARY OBSTRUCTION: ICD-10-CM

## 2021-03-16 DIAGNOSIS — N52.9 ED (ERECTILE DYSFUNCTION) OF ORGANIC ORIGIN: ICD-10-CM

## 2021-03-16 DIAGNOSIS — I48.92 ATRIAL FLUTTER, UNSPECIFIED TYPE (HCC): ICD-10-CM

## 2021-03-16 DIAGNOSIS — C61 PROSTATE CANCER (HCC): ICD-10-CM

## 2021-03-16 DIAGNOSIS — K21.9 GASTROESOPHAGEAL REFLUX DISEASE, UNSPECIFIED WHETHER ESOPHAGITIS PRESENT: ICD-10-CM

## 2021-03-16 PROCEDURE — 99213 OFFICE O/P EST LOW 20 MIN: CPT | Performed by: FAMILY MEDICINE

## 2021-03-16 PROCEDURE — G0439 PPPS, SUBSEQ VISIT: HCPCS | Performed by: FAMILY MEDICINE

## 2021-03-16 RX ORDER — TADALAFIL 20 MG/1
20 TABLET ORAL DAILY PRN
Status: CANCELLED | OUTPATIENT
Start: 2021-03-16

## 2021-03-16 NOTE — PROGRESS NOTES
The ABCs of the Annual Wellness Visit  Subsequent Medicare Wellness Visit    Chief Complaint   Patient presents with   • Medicare Wellness-subsequent       Subjective   History of Present Illness:  Henok Beasley is a 85 y.o. male who presents for a Subsequent Medicare Wellness Visit.    Prostate cancer: diagnosed 2010, s/p radioactive seed placement. No longer seeing urology or oncology. Last PSA 12/2020 undetectable    Atrial flutter: s/p cardioversion in 2008. Maintained in regular rhythm and not maintained on any medication. Follows w/ [facility]- [facility]    GERD: rare heartburn, mostly managed w/ diet. Not taking any medications and prefers not take any.    BPH: generally getting up 2-3 times at night to urinate. Maintained on Flomax 1 pill daily and happy w/ control. Prefers not change.     ED: patient reports using vacuum device along w/ Cialis 20mg daily PRN.  Happy with current control    HEALTH RISK ASSESSMENT    Recent Hospitalizations:  No hospitalization(s) within the last year.    Current Medical Providers:  Patient Care Team:  Ole Romo MD as PCP - General (Internal Medicine)    Smoking Status:  Social History     Tobacco Use   Smoking Status Never Smoker   Smokeless Tobacco Never Used     Alcohol Consumption:  Social History     Substance and Sexual Activity   Alcohol Use Yes   1 drink daily    Depression Screen:   PHQ-2/PHQ-9 Depression Screening 3/16/2021   Little interest or pleasure in doing things 0   Feeling down, depressed, or hopeless 0   Total Score 0   Lost child this past year    Fall Risk Screen:  [place] Fall Risk Assessment was completed, and patient is at LOW risk for falls.Assessment completed on:3/16/2021    Health Habits and Functional and Cognitive Screening:  Functional & Cognitive Status 3/16/2021   Do you have difficulty preparing food and eating? No   Do you have difficulty bathing yourself, getting dressed or grooming yourself? No   Do you have difficulty using the toilet?  No   Do you have difficulty moving around from place to place? No   Do you have trouble with steps or getting out of a bed or a chair? No   Current Diet Well Balanced Diet   Dental Exam Up to date   Eye Exam Up to date   Exercise (times per week) 4 times per week   Current Exercise Activities Include Walking   Do you need help using the phone?  No   Are you deaf or do you have serious difficulty hearing?  No   Do you need help with transportation? No   Do you need help shopping? No   Do you need help preparing meals?  No   Do you need help with housework?  No   Do you need help with laundry? No   Do you need help taking your medications? No   Do you need help managing money? No   Do you ever drive or ride in a car without wearing a seat belt? No   Have you felt unusual stress, anger or loneliness in the last month? No   Who do you live with? Spouse   If you need help, do you have trouble finding someone available to you? No   Have you been bothered in the last four weeks by sexual problems? No   Do you have difficulty concentrating, remembering or making decisions? No     Does the patient have evidence of cognitive impairment? No    Asprin use counseling:Does not need ASA (and currently is not on it)    Age-appropriate Screening Schedule:  Refer to the list below for future screening recommendations based on patient's age, sex and/or medical conditions. Orders for these recommended tests are listed in the plan section. The patient has been provided with a written plan.    Health Maintenance   Topic Date Due   • TDAP/TD VACCINES (1 - Tdap) Never done   • ZOSTER VACCINE (1 of 2) 03/25/2022 (Originally 6/[birth year]/1986)   • INFLUENZA VACCINE  Completed          The following portions of the patient's history were reviewed and updated as appropriate: allergies, current medications, past family history, past medical history, past social history, past surgical history and problem list.    Outpatient Medications Prior to Visit    Medication Sig Dispense Refill   • Ascorbic Acid (VITAMIN C ER) 1000 MG tablet controlled-release VITAMIN C ER 1000 MG ORAL TABLET EXTENDED RELEASE     • Ginkgo Biloba 120 MG tablet GINKOBA TABS     • Glucosamine-Chondroitin 250-200 MG tablet [facility]S GLUCOSAMINE-CHONDROITIN TABS     • MULTIPLE VITAMIN-FOLIC ACID PO MULTIVITAMINS TABS     • tadalafil (Cialis) 20 MG tablet Take 20 mg by mouth Daily As Needed for Erectile Dysfunction.     • tamsulosin (FLOMAX) 0.4 MG capsule 24 hr capsule TAMSULOSIN HCL 0.4 MG CAPS       No facility-administered medications prior to visit.       Patient Active Problem List   Diagnosis   • Atrial flutter (CMS/HCC)   • Cellulitis   • Degeneration of intervertebral disc of lumbar region   • Encounter for immunization   • Encounter for general adult medical examination without abnormal findings   • Enlarged prostate without lower urinary tract symptoms (luts)   • GERD (gastroesophageal reflux disease)   • History of hematuria   • History of malignant neoplasm of prostate   • History of malignant neoplasm of skin   • Hypertension   • Impotence of organic origin   • Left ventricular hypertrophy   • Memory loss   • Osteoarthritis   • RBBB   • Sick sinus syndrome (CMS/HCC)   • Spinal stenosis, lumbar   • Syncope   • Prostate cancer (CMS/HCC)   • ED (erectile dysfunction) of organic origin   • DDD (degenerative disc disease), lumbar   • BPH without urinary obstruction       Advanced Care Planning:  ACP discussion was held with the patient during this visit. Patient has an advance directive in EMR which is still valid.     Review of Systems   Constitutional: Negative for chills, fatigue and unexpected weight change.   HENT: Negative for congestion and rhinorrhea.    Eyes: Negative for visual disturbance.   Respiratory: Negative for cough and shortness of breath.    Cardiovascular: Negative for chest pain and palpitations.   Gastrointestinal: Negative for abdominal pain, constipation, diarrhea,  "nausea and vomiting.        +GERD   Genitourinary: Negative for difficulty urinating.        +nocturia. +ED   Musculoskeletal: Positive for arthralgias. Negative for joint swelling.   Skin: Negative for rash.   Neurological: Negative for weakness and headaches.   Psychiatric/Behavioral: Negative for dysphoric mood. The patient is not nervous/anxious.        Compared to one year ago, the patient feels his physical health is the same.  Compared to one year ago, the patient feels his mental health is the same.    Reviewed chart for potential of high risk medication in the elderly: yes  Reviewed chart for potential of harmful drug interactions in the elderly:yes    Objective    Vitals:    03/16/21 1011   BP: 122/72   BP Location: Left arm   Patient Position: Sitting   Cuff Size: Adult   Pulse: 66   Resp: 16   Temp: 96.9 °F (36.1 °C)   TempSrc: Skin   SpO2: 98%   Weight: 64.4 kg (142 lb)   Height: 165.1 cm (65\")       Body mass index is 23.63 kg/m².  Discussed the patient's BMI with him. The BMI is in the acceptable range.    Physical Exam  Constitutional:       General: He is not in acute distress.     Appearance: He is well-developed.   HENT:      Head: Normocephalic and atraumatic.      Right Ear: Tympanic membrane and external ear normal.      Left Ear: Tympanic membrane and external ear normal.      Nose: Nose normal.      Mouth/Throat:      Mouth: Mucous membranes are moist.      Pharynx: No oropharyngeal exudate or posterior oropharyngeal erythema.   Eyes:      General: No scleral icterus.        Right eye: No discharge.         Left eye: No discharge.      Conjunctiva/sclera: Conjunctivae normal.      Pupils: Pupils are equal, round, and reactive to light.   Neck:      Thyroid: No thyromegaly.      Vascular: No carotid bruit.   Cardiovascular:      Rate and Rhythm: Normal rate and regular rhythm.      Heart sounds: Normal heart sounds. No murmur.   Pulmonary:      Effort: Pulmonary effort is normal. No " respiratory distress.      Breath sounds: Normal breath sounds. No wheezing or rales.   Abdominal:      General: Bowel sounds are normal. There is no distension.      Palpations: Abdomen is soft.      Tenderness: There is no abdominal tenderness.   Musculoskeletal:         General: No deformity. Normal range of motion.      Cervical back: Normal range of motion and neck supple.   Lymphadenopathy:      Cervical: No cervical adenopathy.   Skin:     General: Skin is warm and dry.      Capillary Refill: Capillary refill takes less than 2 seconds.      Findings: No rash.   Neurological:      Mental Status: He is alert and oriented to person, place, and time.      Cranial Nerves: No cranial nerve deficit.      Sensory: No sensory deficit.   Psychiatric:         Behavior: Behavior normal.         Thought Content: Thought content normal.           Assessment/Plan   Medicare Risks and Personalized Health Plan  CMS Preventative Services Quick Reference  Advance Directive Discussion  Alcohol Misuse  Cardiovascular risk  Chronic Pain   Colon Cancer Screening  Dementia/Memory   Depression/Dysphoria  Diabetic Lab Screening   Fall Risk  Immunizations Discussed/Encouraged (specific immunizations; adacel Tdap and Influenza )  Osteoprorosis Risk  Polypharmacy  Prostate Cancer Screening     The above risks/problems have been discussed with the patient.  Pertinent information has been shared with the patient in the After Visit Summary.  Follow up plans and orders are seen below in the Assessment/Plan Section.    Diagnoses and all orders for this visit:    1. Medicare annual wellness visit, subsequent (Primary)   -Recent labs reviewed.  No need to repeat at this time    2. Prostate cancer (CMS/HCC): Diagnosed in 2010 and now status post radioactive seed placement.  PSA remains undetectable after 10 years.  No longer seeing urology   -Continue annual PSA    3. Atrial flutter, unspecified type (CMS/HCC): Status post cardioversion with no  recurrence   -Monitor    4. Gastroesophageal reflux disease, unspecified whether esophagitis present: Symptoms mild.  Patient happy with current control off of medication   -Monitor.  Consider Tums as needed    5. BPH without urinary obstruction: Patient still awakening 2-3 times per night to urinate but prefers not to increase medication   -Continue home Flomax daily    6. ED (erectile dysfunction) of organic origin   -Continue home tadalafil and pump as needed    Follow Up:  Return in about 1 year (around 3/16/2022) for Medicare Wellness.     An After Visit Summary and PPPS were given to the patient.     Preventative:  Colonoscopy: 2016, due 2026  PSA: undetectable 12/2020  DEXA: deferred per patient preference  Shingles: Completed  Pneumonia: Pneumovax 2014  Tdap: Record requested (has had in last 10yrs)  Influenza: 9/2020  COVID: Completed        Answers for HPI/ROS submitted by the patient on 3/15/2021  What is the primary reason for your visit?: Other  Please describe your symptoms.: Wellness check  Have you had these symptoms before?: Yes  How long have you been having these symptoms?: Greater than 2 weeks

## 2021-03-31 ENCOUNTER — OFFICE VISIT (OUTPATIENT)
Dept: CARDIOLOGY | Facility: CLINIC | Age: 86
End: 2021-03-31

## 2021-03-31 VITALS
WEIGHT: 141.25 LBS | OXYGEN SATURATION: 96 % | HEIGHT: 65 IN | SYSTOLIC BLOOD PRESSURE: 143 MMHG | HEART RATE: 53 BPM | DIASTOLIC BLOOD PRESSURE: 81 MMHG | BODY MASS INDEX: 23.53 KG/M2 | TEMPERATURE: 96.9 F

## 2021-03-31 DIAGNOSIS — I48.92 ATRIAL FLUTTER WITH CONTROLLED RESPONSE (HCC): ICD-10-CM

## 2021-03-31 DIAGNOSIS — R00.1 BRADYCARDIA, SINUS: ICD-10-CM

## 2021-03-31 DIAGNOSIS — I49.5 TACHYCARDIA-BRADYCARDIA (HCC): Primary | ICD-10-CM

## 2021-03-31 DIAGNOSIS — I10 ESSENTIAL HYPERTENSION: ICD-10-CM

## 2021-03-31 PROCEDURE — 99214 OFFICE O/P EST MOD 30 MIN: CPT | Performed by: INTERNAL MEDICINE

## 2021-03-31 PROCEDURE — 93000 ELECTROCARDIOGRAM COMPLETE: CPT | Performed by: INTERNAL MEDICINE

## 2021-03-31 NOTE — PROGRESS NOTES
Encounter Date:03/31/2021  Last seen 9/22/2020      Patient ID: Henok Beasley is a 85 y.o. male.    Chief Complaint:  Tachycardia bradycardia syndrome  History of atrial flutter.  History of sinus bradycardia  Elevated blood pressure.  Incomplete right bundle branch block        History of Present Illness     Since I have last seen, the patient has been without any chest discomfort ,shortness of breath, palpitations, dizziness or syncope.  Denies having any headache ,abdominal pain ,nausea, vomiting , diarrhea constipation, loss of weight or loss of appetite.  Denies having any excessive bruising ,hematuria or blood in the stool.    Review of all systems negative except as indicated.    Reviewed ROS.   Assessment and Plan         [[[[[[[[[[[[[[[[[[[[[[[[   impression  ====================  - Tachy-lolis syndrome.  History of atrial flutter.  Patient is maintaining sinus rhythm     Status post electrical cardioversion and patient had significant sinus pauses after cardioversion in May of 2006. Patient had significant  sinus bradycardia and syncope and Toprol was discontinued with improvement of symptoms.       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  Minimal mitral and aortic regurgitation     - Chronic and incomplete right bundle-branch block       -Elevated blood pressure 150/90 today.  It was 135/70 yesterday home.      -History of allergy to horse serum      -Status post hernia repair and right knee surgery.     ====================  Plan  =================  Tachybradycardia syndrome-stable.    Chronic and incomplete right bundle branch block-stable and asymptomatic  EKG showed sinus bradycardia first-degree AV block left ventricular hypertrophy.    History of elevated blood pressure-140/80  Patient is not having any angina pectoris or congestive heart failure.    History of atrial flutter  Status post cardioversion 2006.  Patient is maintaining sinus rhythm.    Medications were reviewed  and updated.  Followup in the office in  6 months.  Further plan will depend on patient's progress.  [[[[[[[[[[[[[[[[[[[[[[[[[[[[[[[[[[                      Diagnosis Plan   1. Tachycardia-bradycardia (CMS/HCC)     2. Essential hypertension     3. Atrial flutter with controlled response (CMS/HCC)     4. Bradycardia, sinus     LAB RESULTS (LAST 7 DAYS)    CBC        BMP        CMP         BNP        TROPONIN        CoAg        Creatinine Clearance  CrCl cannot be calculated (Patient's most recent lab result is older than the maximum 30 days allowed.).    ABG        Radiology  No radiology results for the last day                The following portions of the patient's history were reviewed and updated as appropriate: allergies, current medications, past family history, past medical history, past social history, past surgical history and problem list.    Review of Systems   Constitutional: Negative for malaise/fatigue.   Cardiovascular: Negative for chest pain, leg swelling, palpitations and syncope.   Respiratory: Negative for shortness of breath.    Skin: Negative for rash.   Gastrointestinal: Negative for nausea and vomiting.   Neurological: Negative for dizziness, light-headedness and numbness.         Current Outpatient Medications:   •  Ascorbic Acid (VITAMIN C ER) 1000 MG tablet controlled-release, VITAMIN C ER 1000 MG ORAL TABLET EXTENDED RELEASE, Disp: , Rfl:   •  Ginkgo Biloba 120 MG tablet, GINKOBA TABS, Disp: , Rfl:   •  Glucosamine-Chondroitin 250-200 MG tablet, CVS GLUCOSAMINE-CHONDROITIN TABS, Disp: , Rfl:   •  MULTIPLE VITAMIN-FOLIC ACID PO, MULTIVITAMINS TABS, Disp: , Rfl:   •  tadalafil (Cialis) 20 MG tablet, Take 20 mg by mouth Daily As Needed for Erectile Dysfunction., Disp: , Rfl:   •  tamsulosin (FLOMAX) 0.4 MG capsule 24 hr capsule, TAMSULOSIN HCL 0.4 MG CAPS, Disp: , Rfl:     Allergies   Allergen Reactions   • Other Rash     Horse Serum       Family History   Problem Relation Age of Onset   •  Cancer Mother    • Lung cancer Mother         smoker   • Hyperlipidemia Father    • Anuerysm Father         AAA- smoker   • Hyperlipidemia Other    • Aneurysm Other         AAA   • Hyperlipidemia Other    • Stroke Other    • Coronary artery disease Other    • Anuerysm Paternal Uncle         AAA   • Heart attack Daughter        Past Surgical History:   Procedure Laterality Date   • CATARACT EXTRACTION     • HERNIA REPAIR Right 1967, 2013    R inguinal   • KNEE SURGERY Right 1985    R meniscus repair   • MASTOIDECTOMY  1942   • MOHS SURGERY     • TONSILLECTOMY         Past Medical History:   Diagnosis Date   • Atrial flutter (CMS/HCC)     S/P CARDIOVERSION   • BPH without urinary obstruction    • DDD (degenerative disc disease), lumbar    • ED (erectile dysfunction) of organic origin    • GERD (gastroesophageal reflux disease)    • H/O tachycardia-bradycardia syndrome    • Left ventricular hypertrophy    • Memory loss    • Osteoarthritis    • Prostate cancer (CMS/HCC) 2010    radioactive seeds placed, in remission   • RBBB (right bundle branch block)    • Skin cancer     H/O SKIN CANCER   • Spinal stenosis, lumbar        Family History   Problem Relation Age of Onset   • Cancer Mother    • Lung cancer Mother         smoker   • Hyperlipidemia Father    • Anuerysm Father         AAA- smoker   • Hyperlipidemia Other    • Aneurysm Other         AAA   • Hyperlipidemia Other    • Stroke Other    • Coronary artery disease Other    • Anuerysm Paternal Uncle         AAA   • Heart attack Daughter        Social History     Socioeconomic History   • Marital status:      Spouse name: Not on file   • Number of children: Not on file   • Years of education: Not on file   • Highest education level: Not on file   Tobacco Use   • Smoking status: Never Smoker   • Smokeless tobacco: Never Used   Vaping Use   • Vaping Use: Never used   Substance and Sexual Activity   • Alcohol use: Yes   • Drug use: No   • Sexual activity: Yes  "          ECG 12 Lead    Date/Time: 3/31/2021 2:23 PM  Performed by: Calvin Anderson MD  Authorized by: Calvin Anderson MD   Comparison: compared with previous ECG   Similar to previous ECG  Comparison to previous ECG: Sinus bradycardia 50/min first-degree AV block nonspecific ST-T wave changes normal axis right bundle branch block no ectopy.                Objective:       Physical Exam    /81   Pulse 53   Temp 96.9 °F (36.1 °C)   Ht 165.1 cm (65\")   Wt 64.1 kg (141 lb 4 oz)   SpO2 96%   BMI 23.51 kg/m²   The patient is alert, oriented and in no distress.    Vital signs as noted above.    Head and neck revealed no carotid bruits or jugular venous distension.  No thyromegaly or lymphadenopathy is present.    Lungs clear.  No wheezing.  Breath sounds are normal bilaterally.    Heart normal first and second heart sounds.  No murmur..  No pericardial rub is present.  No gallop is present.    Abdomen soft and nontender.  No organomegaly is present.    Extremities revealed good peripheral pulses without any pedal edema.    Skin warm and dry.    Musculoskeletal system is grossly normal.    CNS grossly normal.    Reviewed and unchanged from last visit.        "

## 2021-04-14 RX ORDER — TAMSULOSIN HYDROCHLORIDE 0.4 MG/1
1 CAPSULE ORAL DAILY
Qty: 90 CAPSULE | Refills: 3 | Status: SHIPPED | OUTPATIENT
Start: 2021-04-14 | End: 2022-03-18 | Stop reason: SDUPTHER

## 2021-10-13 ENCOUNTER — OFFICE VISIT (OUTPATIENT)
Dept: CARDIOLOGY | Facility: CLINIC | Age: 86
End: 2021-10-13

## 2021-10-13 VITALS
HEART RATE: 57 BPM | SYSTOLIC BLOOD PRESSURE: 145 MMHG | DIASTOLIC BLOOD PRESSURE: 88 MMHG | WEIGHT: 138 LBS | BODY MASS INDEX: 22.99 KG/M2 | HEIGHT: 65 IN | OXYGEN SATURATION: 98 %

## 2021-10-13 DIAGNOSIS — R00.1 BRADYCARDIA, SINUS: ICD-10-CM

## 2021-10-13 DIAGNOSIS — I48.92 ATRIAL FLUTTER WITH CONTROLLED RESPONSE (HCC): ICD-10-CM

## 2021-10-13 DIAGNOSIS — I10 ESSENTIAL HYPERTENSION: ICD-10-CM

## 2021-10-13 DIAGNOSIS — I49.5 TACHYCARDIA-BRADYCARDIA (HCC): Primary | ICD-10-CM

## 2021-10-13 PROCEDURE — 93000 ELECTROCARDIOGRAM COMPLETE: CPT | Performed by: INTERNAL MEDICINE

## 2021-10-13 PROCEDURE — 99214 OFFICE O/P EST MOD 30 MIN: CPT | Performed by: INTERNAL MEDICINE

## 2021-10-13 NOTE — PROGRESS NOTES
Encounter Date:10/13/2021  Last seen 3/31/2021      Patient ID: Henok Beasley is a 86 y.o. male.    Chief Complaint:    Tachycardia bradycardia syndrome  History of atrial flutter.  History of sinus bradycardia  Elevated blood pressure.  Incomplete right bundle branch block        History of Present Illness     Since I have last seen, the patient has been without any chest discomfort ,shortness of breath, palpitations, dizziness or syncope.  Denies having any headache ,abdominal pain ,nausea, vomiting , diarrhea constipation, loss of weight or loss of appetite.  Denies having any excessive bruising ,hematuria or blood in the stool.    Review of all systems negative except as indicated.    Reviewed ROS.  Assessment and Plan         [[[[[[[[[[[[[[[[[[[[[[[[   impression  ====================  - Tachy-lolis syndrome.  History of atrial flutter.  Patient is maintaining sinus rhythm     Status post electrical cardioversion and patient had significant sinus pauses after cardioversion in May of 2006. Patient had significant  sinus bradycardia and syncope and Toprol was discontinued with improvement of symptoms.       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  Minimal mitral and aortic regurgitation     - Chronic and incomplete right bundle-branch block       -Elevated blood pressure       -History of allergy to horse serum      -Status post hernia repair and right knee surgery.     ====================  Plan  =================  Tachybradycardia syndrome-stable.     Chronic and incomplete right bundle branch block-stable and asymptomatic  EKG showed sinus bradycardia first-degree AV block left ventricular hypertrophy.  1 cycle of 2.4-second pause was noted on the EKG.  I have discussed the options with patient that included 30-day event monitor.  Patient prefers to be observed.     History of elevated blood pressure-145/80  Patient is not having any angina pectoris or congestive heart  failure.     History of atrial flutter  Status post cardioversion 2006.  Patient is maintaining sinus rhythm.     Medications were reviewed and updated.    Followup in the office in next weeks with EKG.  Patient was advised to get in touch with me if he starts having any symptoms of dizziness near syncope etc.  Patient is not on any medications to cause bradycardia.    Further plan will depend on patient's progress.  [[[[[[[[[[[[[[[[[[[[[[[[[[[[[[[[[[                        Diagnosis Plan   1. Tachycardia-bradycardia (HCC)     2. Essential hypertension     3. Atrial flutter with controlled response (HCC)     4. Bradycardia, sinus     LAB RESULTS (LAST 7 DAYS)    CBC        BMP        CMP         BNP        TROPONIN        CoAg        Creatinine Clearance  CrCl cannot be calculated (Patient's most recent lab result is older than the maximum 30 days allowed.).    ABG        Radiology  No radiology results for the last day                The following portions of the patient's history were reviewed and updated as appropriate: allergies, current medications, past family history, past medical history, past social history, past surgical history and problem list.    Review of Systems   Constitutional: Negative for fever and malaise/fatigue.   HENT: Negative for ear pain and nosebleeds.    Eyes: Negative for blurred vision and double vision.   Cardiovascular: Negative for chest pain, dyspnea on exertion and palpitations.   Respiratory: Negative for cough and shortness of breath.    Skin: Negative for rash.   Musculoskeletal: Negative for joint pain.   Gastrointestinal: Negative for abdominal pain, nausea and vomiting.   Neurological: Negative for focal weakness and headaches.   Psychiatric/Behavioral: Negative for depression. The patient is not nervous/anxious.    All other systems reviewed and are negative.        Current Outpatient Medications:   •  Ascorbic Acid (VITAMIN C ER) 1000 MG tablet controlled-release, VITAMIN C  ER 1000 MG ORAL TABLET EXTENDED RELEASE, Disp: , Rfl:   •  Ginkgo Biloba 120 MG tablet, GINKOBA TABS, Disp: , Rfl:   •  Glucosamine-Chondroitin 250-200 MG tablet, CVS GLUCOSAMINE-CHONDROITIN TABS, Disp: , Rfl:   •  MULTIPLE VITAMIN-FOLIC ACID PO, MULTIVITAMINS TABS, Disp: , Rfl:   •  tadalafil (Cialis) 20 MG tablet, Take 20 mg by mouth Daily As Needed for Erectile Dysfunction., Disp: , Rfl:   •  tamsulosin (FLOMAX) 0.4 MG capsule 24 hr capsule, Take 1 capsule by mouth Daily., Disp: 90 capsule, Rfl: 3    Allergies   Allergen Reactions   • Other Rash     Horse Serum       Family History   Problem Relation Age of Onset   • Cancer Mother    • Lung cancer Mother         smoker   • Hyperlipidemia Father    • Anuerysm Father         AAA- smoker   • Hyperlipidemia Other    • Aneurysm Other         AAA   • Hyperlipidemia Other    • Stroke Other    • Coronary artery disease Other    • Anuerysm Paternal Uncle         AAA   • Heart attack Daughter        Past Surgical History:   Procedure Laterality Date   • CATARACT EXTRACTION     • HERNIA REPAIR Right 1967, 2013    R inguinal   • KNEE SURGERY Right 1985    R meniscus repair   • MASTOIDECTOMY  1942   • MOHS SURGERY     • TONSILLECTOMY         Past Medical History:   Diagnosis Date   • Atrial flutter (HCC)     S/P CARDIOVERSION   • BPH without urinary obstruction    • DDD (degenerative disc disease), lumbar    • ED (erectile dysfunction) of organic origin    • GERD (gastroesophageal reflux disease)    • H/O tachycardia-bradycardia syndrome    • Left ventricular hypertrophy    • Memory loss    • Osteoarthritis    • Prostate cancer (HCC) 2010    radioactive seeds placed, in remission   • RBBB (right bundle branch block)    • Skin cancer     H/O SKIN CANCER   • Spinal stenosis, lumbar        Family History   Problem Relation Age of Onset   • Cancer Mother    • Lung cancer Mother         smoker   • Hyperlipidemia Father    • Anuerysm Father         AAA- smoker   • Hyperlipidemia  "Other    • Aneurysm Other         AAA   • Hyperlipidemia Other    • Stroke Other    • Coronary artery disease Other    • Anuerysm Paternal Uncle         AAA   • Heart attack Daughter        Social History     Socioeconomic History   • Marital status:    Tobacco Use   • Smoking status: Never Smoker   • Smokeless tobacco: Never Used   Vaping Use   • Vaping Use: Never used   Substance and Sexual Activity   • Alcohol use: Yes   • Drug use: No   • Sexual activity: Yes           ECG 12 Lead    Date/Time: 10/13/2021 1:16 PM  Performed by: Calvin Anderson MD  Authorized by: Calvin Anderson MD   Comparison: compared with previous ECG   Similar to previous ECG  Comparison to previous ECG: Sinus bradycardia 2.4-second pause (1 cycle).  It could be due to change in the set of leads from limb leads to aVR aVL aVF.  Right bundle branch block nonspecific ST-T wave abnormalities 43/min compared to 3/31/2021 1 pause is present.                Objective:       Physical Exam    /88   Pulse 57   Ht 165.1 cm (65\")   Wt 62.6 kg (138 lb)   SpO2 98%   BMI 22.96 kg/m²   The patient is alert, oriented and in no distress.    Vital signs as noted above.    Head and neck revealed no carotid bruits or jugular venous distension.  No thyromegaly or lymphadenopathy is present.    Lungs clear.  No wheezing.  Breath sounds are normal bilaterally.    Heart normal first and second heart sounds.  No murmur..  No pericardial rub is present.  No gallop is present.    Abdomen soft and nontender.  No organomegaly is present.    Extremities revealed good peripheral pulses without any pedal edema.    Skin warm and dry.    Musculoskeletal system is grossly normal.    CNS grossly normal.    Reviewed and unchanged from last visit.        "

## 2021-11-23 ENCOUNTER — OFFICE VISIT (OUTPATIENT)
Dept: CARDIOLOGY | Facility: CLINIC | Age: 86
End: 2021-11-23

## 2021-11-23 VITALS
BODY MASS INDEX: 22.82 KG/M2 | OXYGEN SATURATION: 98 % | SYSTOLIC BLOOD PRESSURE: 153 MMHG | WEIGHT: 137 LBS | HEIGHT: 65 IN | HEART RATE: 48 BPM | DIASTOLIC BLOOD PRESSURE: 89 MMHG

## 2021-11-23 DIAGNOSIS — I49.5 TACHYCARDIA-BRADYCARDIA (HCC): Primary | ICD-10-CM

## 2021-11-23 DIAGNOSIS — I48.92 ATRIAL FLUTTER WITH CONTROLLED RESPONSE (HCC): ICD-10-CM

## 2021-11-23 DIAGNOSIS — I10 ESSENTIAL HYPERTENSION: ICD-10-CM

## 2021-11-23 DIAGNOSIS — R00.1 BRADYCARDIA, SINUS: ICD-10-CM

## 2021-11-23 PROCEDURE — 99214 OFFICE O/P EST MOD 30 MIN: CPT | Performed by: INTERNAL MEDICINE

## 2021-11-23 PROCEDURE — 93000 ELECTROCARDIOGRAM COMPLETE: CPT | Performed by: INTERNAL MEDICINE

## 2021-11-23 NOTE — PROGRESS NOTES
Encounter Date:11/23/2021  Last seen 10/13/2021      Patient ID: Henok Beasley is a 86 y.o. male.    Chief Complaint:    Tachycardia bradycardia syndrome  History of atrial flutter.  History of sinus bradycardia  Elevated blood pressure.  Incomplete right bundle branch block        History of Present Illness     Since I have last seen, the patient has been without any chest discomfort ,shortness of breath, palpitations, dizziness or syncope.  Denies having any headache ,abdominal pain ,nausea, vomiting , diarrhea constipation, loss of weight or loss of appetite.  Denies having any excessive bruising ,hematuria or blood in the stool.    Review of all systems negative except as indicated.    Reviewed ROS.  Assessment and Plan         [[[[[[[[[[[[[[[[[[[[[[[[   impression  ====================  - Tachy-lolis syndrome.  History of atrial flutter.  Patient is maintaining sinus rhythm     Status post electrical cardioversion and patient had significant sinus pauses after cardioversion in May of 2006. Patient had significant  sinus bradycardia and syncope and Toprol was discontinued with improvement of symptoms.       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  Minimal mitral and aortic regurgitation     - Chronic and incomplete right bundle-branch block       -Elevated blood pressure       -History of allergy to horse serum      -Status post hernia repair and right knee surgery.     ====================  Plan  =================  Tachybradycardia syndrome-stable.     Chronic and incomplete right bundle branch block-stable and asymptomatic  EKG showed sinus bradycardia first-degree AV block left ventricular hypertrophy.  1 cycle of 2.4-second pause was noted on the EKG.  I have discussed the options with patient that included 30-day event monitor.  Patient prefers to be observed.     History of elevated blood pressure-145/80  Patient is not having any angina pectoris or congestive heart  failure.     History of atrial flutter  Status post cardioversion 2006.  Patient is maintaining sinus rhythm.     Medications were reviewed and updated.     Patient is asymptomatic.  EKG showed sinus bradycardia right bundle branch block-asymptomatic.  Patient is not on any medications to cause bradycardia.    Follow-up in the office in 6 months.     Further plan will depend on patient's progress.  [[[[[[[[[[[[[[[[[[[[[[[[[[[[[[[[[[                     Diagnosis Plan   1. Tachycardia-bradycardia (HCC)  ECG 12 Lead   2. Essential hypertension  ECG 12 Lead   3. Atrial flutter with controlled response (HCC)  ECG 12 Lead   4. Bradycardia, sinus  ECG 12 Lead   LAB RESULTS (LAST 7 DAYS)    CBC        BMP        CMP         BNP        TROPONIN        CoAg        Creatinine Clearance  CrCl cannot be calculated (Patient's most recent lab result is older than the maximum 30 days allowed.).    ABG        Radiology  No radiology results for the last day                The following portions of the patient's history were reviewed and updated as appropriate: allergies, current medications, past family history, past medical history, past social history, past surgical history and problem list.    Review of Systems   Constitutional: Negative for malaise/fatigue.   Cardiovascular: Negative for chest pain, leg swelling and palpitations.   Respiratory: Negative for shortness of breath.    Skin: Negative for rash.   Neurological: Negative for dizziness, light-headedness and numbness.         Current Outpatient Medications:   •  Ascorbic Acid (VITAMIN C ER) 1000 MG tablet controlled-release, VITAMIN C ER 1000 MG ORAL TABLET EXTENDED RELEASE, Disp: , Rfl:   •  Ginkgo Biloba 120 MG tablet, GINKOBA TABS, Disp: , Rfl:   •  Glucosamine-Chondroitin 250-200 MG tablet, CVS GLUCOSAMINE-CHONDROITIN TABS, Disp: , Rfl:   •  MULTIPLE VITAMIN-FOLIC ACID PO, MULTIVITAMINS TABS, Disp: , Rfl:   •  tadalafil (Cialis) 20 MG tablet, Take 20 mg by mouth  Daily As Needed for Erectile Dysfunction., Disp: , Rfl:   •  tamsulosin (FLOMAX) 0.4 MG capsule 24 hr capsule, Take 1 capsule by mouth Daily., Disp: 90 capsule, Rfl: 3    Allergies   Allergen Reactions   • Other Rash     Horse Serum       Family History   Problem Relation Age of Onset   • Cancer Mother    • Lung cancer Mother         smoker   • Hyperlipidemia Father    • Anuerysm Father         AAA- smoker   • Hyperlipidemia Other    • Aneurysm Other         AAA   • Hyperlipidemia Other    • Stroke Other    • Coronary artery disease Other    • Anuerysm Paternal Uncle         AAA   • Heart attack Daughter    • Heart failure Sister         Hypoplastic right coronary artery       Past Surgical History:   Procedure Laterality Date   • CATARACT EXTRACTION     • HERNIA REPAIR Right 1967, 2013    R inguinal   • KNEE SURGERY Right 1985    R meniscus repair   • MASTOIDECTOMY  1942   • MOHS SURGERY     • TONSILLECTOMY         Past Medical History:   Diagnosis Date   • Abnormal ECG 1984   • Atrial fibrillation (HCC)    • Atrial flutter (HCC)     S/P CARDIOVERSION   • BPH without urinary obstruction    • DDD (degenerative disc disease), lumbar    • ED (erectile dysfunction) of organic origin    • GERD (gastroesophageal reflux disease)    • H/O tachycardia-bradycardia syndrome    • Left ventricular hypertrophy    • Memory loss    • Osteoarthritis    • Prostate cancer (HCC) 2010    radioactive seeds placed, in remission   • RBBB (right bundle branch block)    • Skin cancer     H/O SKIN CANCER   • Spinal stenosis, lumbar        Family History   Problem Relation Age of Onset   • Cancer Mother    • Lung cancer Mother         smoker   • Hyperlipidemia Father    • Anuerysm Father         AAA- smoker   • Hyperlipidemia Other    • Aneurysm Other         AAA   • Hyperlipidemia Other    • Stroke Other    • Coronary artery disease Other    • Anuerysm Paternal Uncle         AAA   • Heart attack Daughter    • Heart failure Sister          "Hypoplastic right coronary artery       Social History     Socioeconomic History   • Marital status:    Tobacco Use   • Smoking status: Never Smoker   • Smokeless tobacco: Never Used   Vaping Use   • Vaping Use: Never used   Substance and Sexual Activity   • Alcohol use: Yes     Alcohol/week: 6.0 standard drinks     Types: 6 Standard drinks or equivalent per week   • Drug use: Never   • Sexual activity: Yes     Partners: Female     Birth control/protection: Post-menopausal           ECG 12 Lead    Date/Time: 11/23/2021 1:38 PM  Performed by: Calvin Anderson MD  Authorized by: Calvin Anderson MD   Comparison: compared with previous ECG   Similar to previous ECG  Comparison to previous ECG: Sinus bradycardia 47/min nonspecific ST-T wave changes incomplete right bundle branch block no ectopy nonspecific ST-T wave changes no significant change from 10/13/2021                Objective:       Physical Exam    /89   Pulse (!) 48   Ht 165.1 cm (65\")   Wt 62.1 kg (137 lb)   SpO2 98%   BMI 22.80 kg/m²   The patient is alert, oriented and in no distress.    Vital signs as noted above.    Head and neck revealed no carotid bruits or jugular venous distension.  No thyromegaly or lymphadenopathy is present.    Lungs clear.  No wheezing.  Breath sounds are normal bilaterally.    Heart normal first and second heart sounds.  No murmur..  No pericardial rub is present.  No gallop is present.    Abdomen soft and nontender.  No organomegaly is present.    Extremities revealed good peripheral pulses without any pedal edema.    Skin warm and dry.    Musculoskeletal system is grossly normal.    CNS grossly normal.    Reviewed and unchanged from last visit.        "

## 2022-02-14 RX ORDER — TADALAFIL 20 MG/1
20 TABLET ORAL DAILY PRN
Qty: 30 TABLET | Refills: 1 | Status: SHIPPED | OUTPATIENT
Start: 2022-02-14

## 2022-02-14 NOTE — TELEPHONE ENCOUNTER
Caller: Henok Beasley    Relationship: Self    Best call back number: [phone]    Requested Prescriptions:   Requested Prescriptions     Pending Prescriptions Disp Refills   • tadalafil (Cialis) 20 MG tablet       Sig: Take 1 tablet by mouth Daily As Needed for Erectile Dysfunction.        Pharmacy where request should be sent: [facility] [address] [place] [address], [place] - [address] [facility] [phone] [facility] [phone]      Additional details provided by patient: PREFERS 30 DAY SUPPLY    Sandro Youssef Rep   02/14/22 14:40 EST

## 2022-03-10 ENCOUNTER — LAB (OUTPATIENT)
Dept: FAMILY MEDICINE CLINIC | Facility: CLINIC | Age: 87
End: 2022-03-10

## 2022-03-10 DIAGNOSIS — C61 PROSTATE CANCER: ICD-10-CM

## 2022-03-10 DIAGNOSIS — Z00.00 MEDICARE ANNUAL WELLNESS VISIT, SUBSEQUENT: Primary | ICD-10-CM

## 2022-03-10 DIAGNOSIS — E55.9 VITAMIN D DEFICIENCY, UNSPECIFIED: ICD-10-CM

## 2022-03-10 DIAGNOSIS — Z00.00 PREVENTATIVE HEALTH CARE: ICD-10-CM

## 2022-03-10 LAB
25(OH)D3 SERPL-MCNC: 26.6 NG/ML (ref 30–100)
ALBUMIN SERPL-MCNC: 4.3 G/DL (ref 3.5–5.2)
ALBUMIN/GLOB SERPL: 1.7 G/DL
ALP SERPL-CCNC: 72 U/L (ref 39–117)
ALT SERPL W P-5'-P-CCNC: 31 U/L (ref 1–41)
ANION GAP SERPL CALCULATED.3IONS-SCNC: 6.5 MMOL/L (ref 5–15)
AST SERPL-CCNC: 24 U/L (ref 1–40)
BASOPHILS # BLD AUTO: 0.06 10*3/MM3 (ref 0–0.2)
BASOPHILS NFR BLD AUTO: 1.1 % (ref 0–1.5)
BILIRUB SERPL-MCNC: 0.8 MG/DL (ref 0–1.2)
BUN SERPL-MCNC: 23 MG/DL (ref 8–23)
BUN/CREAT SERPL: 18.1 (ref 7–25)
CALCIUM SPEC-SCNC: 9.6 MG/DL (ref 8.6–10.5)
CHLORIDE SERPL-SCNC: 102 MMOL/L (ref 98–107)
CHOLEST SERPL-MCNC: 165 MG/DL (ref 0–200)
CO2 SERPL-SCNC: 29.5 MMOL/L (ref 22–29)
CREAT SERPL-MCNC: 1.27 MG/DL (ref 0.76–1.27)
DEPRECATED RDW RBC AUTO: 43.9 FL (ref 37–54)
EGFRCR SERPLBLD CKD-EPI 2021: 55.4 ML/MIN/1.73
EOSINOPHIL # BLD AUTO: 0.12 10*3/MM3 (ref 0–0.4)
EOSINOPHIL NFR BLD AUTO: 2.2 % (ref 0.3–6.2)
ERYTHROCYTE [DISTWIDTH] IN BLOOD BY AUTOMATED COUNT: 12.2 % (ref 12.3–15.4)
GLOBULIN UR ELPH-MCNC: 2.5 GM/DL
GLUCOSE SERPL-MCNC: 90 MG/DL (ref 65–99)
HBA1C MFR BLD: 5.3 % (ref 3.5–5.6)
HCT VFR BLD AUTO: 49.5 % (ref 37.5–51)
HDLC SERPL-MCNC: 62 MG/DL (ref 40–60)
HGB BLD-MCNC: 16.4 G/DL (ref 13–17.7)
IMM GRANULOCYTES # BLD AUTO: 0.01 10*3/MM3 (ref 0–0.05)
IMM GRANULOCYTES NFR BLD AUTO: 0.2 % (ref 0–0.5)
LDLC SERPL CALC-MCNC: 87 MG/DL (ref 0–100)
LDLC/HDLC SERPL: 1.39 {RATIO}
LYMPHOCYTES # BLD AUTO: 1.21 10*3/MM3 (ref 0.7–3.1)
LYMPHOCYTES NFR BLD AUTO: 21.8 % (ref 19.6–45.3)
MCH RBC QN AUTO: 32.3 PG (ref 26.6–33)
MCHC RBC AUTO-ENTMCNC: 33.1 G/DL (ref 31.5–35.7)
MCV RBC AUTO: 97.4 FL (ref 79–97)
MONOCYTES # BLD AUTO: 0.62 10*3/MM3 (ref 0.1–0.9)
MONOCYTES NFR BLD AUTO: 11.2 % (ref 5–12)
NEUTROPHILS NFR BLD AUTO: 3.54 10*3/MM3 (ref 1.7–7)
NEUTROPHILS NFR BLD AUTO: 63.5 % (ref 42.7–76)
NRBC BLD AUTO-RTO: 0 /100 WBC (ref 0–0.2)
PLATELET # BLD AUTO: 217 10*3/MM3 (ref 140–450)
PMV BLD AUTO: 10.4 FL (ref 6–12)
POTASSIUM SERPL-SCNC: 4.8 MMOL/L (ref 3.5–5.2)
PROT SERPL-MCNC: 6.8 G/DL (ref 6–8.5)
RBC # BLD AUTO: 5.08 10*6/MM3 (ref 4.14–5.8)
SODIUM SERPL-SCNC: 138 MMOL/L (ref 136–145)
T4 FREE SERPL-MCNC: 1.36 NG/DL (ref 0.93–1.7)
TRIGL SERPL-MCNC: 84 MG/DL (ref 0–150)
VIT B12 BLD-MCNC: 682 PG/ML (ref 211–946)
VLDLC SERPL-MCNC: 16 MG/DL (ref 5–40)
WBC NRBC COR # BLD: 5.56 10*3/MM3 (ref 3.4–10.8)

## 2022-03-10 PROCEDURE — 85025 COMPLETE CBC W/AUTO DIFF WBC: CPT | Performed by: FAMILY MEDICINE

## 2022-03-10 PROCEDURE — 82306 VITAMIN D 25 HYDROXY: CPT | Performed by: FAMILY MEDICINE

## 2022-03-10 PROCEDURE — 84439 ASSAY OF FREE THYROXINE: CPT | Performed by: FAMILY MEDICINE

## 2022-03-10 PROCEDURE — 82607 VITAMIN B-12: CPT | Performed by: FAMILY MEDICINE

## 2022-03-10 PROCEDURE — 83036 HEMOGLOBIN GLYCOSYLATED A1C: CPT | Performed by: FAMILY MEDICINE

## 2022-03-10 PROCEDURE — 80061 LIPID PANEL: CPT | Performed by: FAMILY MEDICINE

## 2022-03-10 PROCEDURE — G0103 PSA SCREENING: HCPCS | Performed by: FAMILY MEDICINE

## 2022-03-10 PROCEDURE — 80053 COMPREHEN METABOLIC PANEL: CPT | Performed by: FAMILY MEDICINE

## 2022-03-10 PROCEDURE — 36415 COLL VENOUS BLD VENIPUNCTURE: CPT

## 2022-03-11 DIAGNOSIS — Z00.00 PREVENTATIVE HEALTH CARE: Primary | ICD-10-CM

## 2022-03-11 DIAGNOSIS — Z12.5 ENCOUNTER FOR SCREENING FOR MALIGNANT NEOPLASM OF PROSTATE: ICD-10-CM

## 2022-03-11 LAB — PSA SERPL-MCNC: <0.014 NG/ML (ref 0–4)

## 2022-03-18 ENCOUNTER — OFFICE VISIT (OUTPATIENT)
Dept: FAMILY MEDICINE CLINIC | Facility: CLINIC | Age: 87
End: 2022-03-18

## 2022-03-18 VITALS
DIASTOLIC BLOOD PRESSURE: 82 MMHG | RESPIRATION RATE: 16 BRPM | HEART RATE: 52 BPM | HEIGHT: 65 IN | TEMPERATURE: 96.9 F | BODY MASS INDEX: 22.99 KG/M2 | OXYGEN SATURATION: 98 % | WEIGHT: 138 LBS | SYSTOLIC BLOOD PRESSURE: 156 MMHG

## 2022-03-18 DIAGNOSIS — K21.9 GASTROESOPHAGEAL REFLUX DISEASE, UNSPECIFIED WHETHER ESOPHAGITIS PRESENT: ICD-10-CM

## 2022-03-18 DIAGNOSIS — H61.21 IMPACTED CERUMEN OF RIGHT EAR: ICD-10-CM

## 2022-03-18 DIAGNOSIS — N52.9 ED (ERECTILE DYSFUNCTION) OF ORGANIC ORIGIN: ICD-10-CM

## 2022-03-18 DIAGNOSIS — C61 PROSTATE CANCER: ICD-10-CM

## 2022-03-18 DIAGNOSIS — M51.36 DDD (DEGENERATIVE DISC DISEASE), LUMBAR: ICD-10-CM

## 2022-03-18 DIAGNOSIS — I48.92 ATRIAL FLUTTER, UNSPECIFIED TYPE: ICD-10-CM

## 2022-03-18 DIAGNOSIS — Z00.00 MEDICARE ANNUAL WELLNESS VISIT, SUBSEQUENT: Primary | ICD-10-CM

## 2022-03-18 DIAGNOSIS — N40.0 BPH WITHOUT URINARY OBSTRUCTION: ICD-10-CM

## 2022-03-18 PROBLEM — Z96.1 BILATERAL PSEUDOPHAKIA: Status: ACTIVE | Noted: 2017-07-18

## 2022-03-18 PROCEDURE — 1170F FXNL STATUS ASSESSED: CPT | Performed by: FAMILY MEDICINE

## 2022-03-18 PROCEDURE — 69210 REMOVE IMPACTED EAR WAX UNI: CPT | Performed by: FAMILY MEDICINE

## 2022-03-18 PROCEDURE — G0439 PPPS, SUBSEQ VISIT: HCPCS | Performed by: FAMILY MEDICINE

## 2022-03-18 PROCEDURE — 99213 OFFICE O/P EST LOW 20 MIN: CPT | Performed by: FAMILY MEDICINE

## 2022-03-18 PROCEDURE — 1160F RVW MEDS BY RX/DR IN RCRD: CPT | Performed by: FAMILY MEDICINE

## 2022-03-18 RX ORDER — TAMSULOSIN HYDROCHLORIDE 0.4 MG/1
1 CAPSULE ORAL DAILY
Qty: 90 CAPSULE | Refills: 3 | Status: SHIPPED | OUTPATIENT
Start: 2022-03-18 | End: 2023-03-20

## 2022-03-18 NOTE — PROGRESS NOTES
Procedure   Ear Cerumen Removal    Date/Time: 3/18/2022 10:40 AM  Performed by: Ole Romo MD  Authorized by: Ole Romo MD     Anesthesia:  Local Anesthetic: none  Anesthetic total: 0 drops  Location details: right ear  Patient tolerance: patient tolerated the procedure well with no immediate complications  Procedure type: instrumentation, curette   Sedation:  Patient sedated: no

## 2022-03-18 NOTE — PROGRESS NOTES
The ABCs of the Annual Wellness Visit  Subsequent Medicare Wellness Visit    Chief Complaint   Patient presents with   • Medicare Wellness-subsequent     Subjective    History of Present Illness:  Henok Beasley is a 86 y.o. male who presents for a Subsequent Medicare Wellness Visit.    Prostate cancer: diagnosed 2009, s/p radioactive seed placement in 2010. No longer seeing urology or oncology. Last PSA 3/2022 undetectable     Atrial flutter: s/p cardioversion in 2008. Denies chest pain or recurrence. Maintained in regular rhythm and not maintained on any medication. Follows w/ [facility]- Justin     GERD: rare reflux/heartburn when lying down, mostly managed w/ diet and drinking water. Not taking any medications and prefers not take any.     BPH: generally getting up 2-3 times at night to urinate. No decrease in stream. Maintained on Flomax 1 pill daily and happy w/ control. Prefers not change.      ED: patient reports using vacuum device along w/ Cialis 20mg daily PRN.  Happy with current control    Back pain: patient has been seeing chiropractor regularly but continues to have a fair amount of gluteal pain, R>L. Chiropractor is helpful but still having discomfort. Does regular stretches. Has not seen PT.     Reports WC HTN- generally 120s and 130s at home.    The following portions of the patient's history were reviewed and updated as appropriate: allergies, current medications, past family history, past medical history, past social history, past surgical history and problem list.    Compared to one year ago, the patient feels his physical   health is worse.    Compared to one year ago, the patient feels his mental   health is the same.    Recent Hospitalizations:  He was not admitted to the hospital during the last year.     Current Medical Providers:  Patient Care Team:  Ole Romo MD as PCP - General (Internal Medicine)  Calvin Anderson MD as Consulting Physician (Cardiology)    Outpatient Medications  Prior to Visit   Medication Sig Dispense Refill   • Ascorbic Acid (VITAMIN C ER) 1000 MG tablet controlled-release VITAMIN C ER 1000 MG ORAL TABLET EXTENDED RELEASE     • Ginkgo Biloba 120 MG tablet GINKOBA TABS     • Glucosamine-Chondroitin 250-200 MG tablet [facility] GLUCOSAMINE-CHONDROITIN TABS     • MULTIPLE VITAMIN-FOLIC ACID PO MULTIVITAMINS TABS     • tadalafil (Cialis) 20 MG tablet Take 1 tablet by mouth Daily As Needed for Erectile Dysfunction. 30 tablet 1   • tamsulosin (FLOMAX) 0.4 MG capsule 24 hr capsule Take 1 capsule by mouth Daily. 90 capsule 3     No facility-administered medications prior to visit.     No opioid medication identified on active medication list. I have reviewed chart for other potential  high risk medication/s and harmful drug interactions in the elderly.        Aspirin is not on active medication list.  Aspirin use is not indicated based on review of current medical condition/s. Risk of harm outweighs potential benefits.  .    Patient Active Problem List   Diagnosis   • Atrial flutter (HCC)   • Degeneration of intervertebral disc of lumbar region   • Enlarged prostate without lower urinary tract symptoms (luts)   • GERD (gastroesophageal reflux disease)   • History of hematuria   • History of malignant neoplasm of prostate   • History of malignant neoplasm of skin   • Hypertension   • Impotence of organic origin   • Left ventricular hypertrophy   • Memory loss   • Osteoarthritis   • RBBB   • Sick sinus syndrome (HCC)   • Spinal stenosis, lumbar   • Syncope   • Prostate cancer (HCC)   • ED (erectile dysfunction) of organic origin   • DDD (degenerative disc disease), lumbar   • BPH without urinary obstruction   • Bilateral pseudophakia   • Vitreous opacities     Advance Care Planning  Advance Directive is on file.  ACP discussion was held with the patient during this visit. Patient has an advance directive in EMR which is still valid.   Review of Systems   Constitutional: Negative for  "chills and fever.   HENT: Negative for congestion and rhinorrhea.    Eyes: Negative for visual disturbance.   Respiratory: Negative for cough and shortness of breath.    Cardiovascular: Negative for chest pain and palpitations.   Gastrointestinal: Negative for abdominal pain and vomiting.        + Reflux   Genitourinary: Negative for difficulty urinating and dysuria.        + Nocturia, + ED   Musculoskeletal: Positive for back pain. Negative for arthralgias.   Skin: Negative for rash.   Neurological: Negative for dizziness, light-headedness and numbness.   Psychiatric/Behavioral: Negative for dysphoric mood and sleep disturbance. The patient is not nervous/anxious.         Objective    Vitals:    03/18/22 1005   BP: 156/82   BP Location: Left arm   Patient Position: Sitting   Cuff Size: Adult   Pulse: 52   Resp: 16   Temp: 96.9 °F (36.1 °C)   TempSrc: Temporal   SpO2: 98%   Weight: 62.6 kg (138 lb)   Height: 165.1 cm (65\")     BMI Readings from Last 1 Encounters:   03/18/22 22.96 kg/m²   BMI is within normal parameters. No follow-up required.    Does the patient have evidence of cognitive impairment? No    Physical Exam  Constitutional:       General: He is not in acute distress.     Appearance: He is well-developed.   HENT:      Head: Normocephalic and atraumatic.      Right Ear: Tympanic membrane and external ear normal. There is impacted cerumen.      Left Ear: Tympanic membrane and external ear normal. There is no impacted cerumen.      Nose: Nose normal.      Mouth/Throat:      Mouth: Mucous membranes are moist.      Pharynx: No oropharyngeal exudate or posterior oropharyngeal erythema.   Eyes:      General: No scleral icterus.        Right eye: No discharge.         Left eye: No discharge.      Extraocular Movements: Extraocular movements intact.      Conjunctiva/sclera: Conjunctivae normal.      Pupils: Pupils are equal, round, and reactive to light.   Neck:      Thyroid: No thyromegaly.      Vascular: No " carotid bruit.   Cardiovascular:      Rate and Rhythm: Normal rate and regular rhythm.      Heart sounds: Normal heart sounds. No murmur heard.  Pulmonary:      Effort: Pulmonary effort is normal. No respiratory distress.      Breath sounds: Normal breath sounds. No wheezing or rales.   Abdominal:      General: Bowel sounds are normal. There is no distension.      Palpations: Abdomen is soft.      Tenderness: There is no abdominal tenderness.   Musculoskeletal:         General: No deformity. Normal range of motion.      Cervical back: Normal range of motion and neck supple.   Lymphadenopathy:      Cervical: No cervical adenopathy.   Skin:     General: Skin is warm and dry.      Findings: No rash.   Neurological:      General: No focal deficit present.      Mental Status: He is alert and oriented to person, place, and time. Mental status is at baseline.      Cranial Nerves: No cranial nerve deficit.      Sensory: No sensory deficit.   Psychiatric:         Behavior: Behavior normal.         Thought Content: Thought content normal.       Lab Results   Component Value Date    TRIG 84 03/10/2022    HDL 62 (H) 03/10/2022    LDL 87 03/10/2022    VLDL 16 03/10/2022    HGBA1C 5.3 03/10/2022        HEALTH RISK ASSESSMENT    Smoking Status:  Social History     Tobacco Use   Smoking Status Never Smoker   Smokeless Tobacco Never Used     Alcohol Consumption:  Social History     Substance and Sexual Activity   Alcohol Use Yes   • Alcohol/week: 6.0 standard drinks   • Types: 6 Standard drinks or equivalent per week     Fall Risk Screen:  [facility], and patient is at LOW risk for falls.Assessment completed on:3/18/2022    Depression Screening:  PHQ-2/PHQ-9 Depression Screening 3/18/2022   Retired Total Score -   Little Interest or Pleasure in Doing Things 0-->not at all   Feeling Down, Depressed or Hopeless 0-->not at all   PHQ-9: Brief Depression Severity Measure Score 0   Occasional feelings of  down/depression related to recent death of two children.     Health Habits and Functional and Cognitive Screening:  Functional & Cognitive Status 3/18/2022   Do you have difficulty preparing food and eating? No   Do you have difficulty bathing yourself, getting dressed or grooming yourself? No   Do you have difficulty using the toilet? No   Do you have difficulty moving around from place to place? No   Do you have trouble with steps or getting out of a bed or a chair? No   Current Diet Well Balanced Diet   Dental Exam Up to date   Eye Exam Up to date   Exercise (times per week) 7 times per week   Current Exercises Include Walking;Running/Jogging   Current Exercise Activities Include -   Do you need help using the phone?  No   Are you deaf or do you have serious difficulty hearing?  No   Do you need help with transportation? No   Do you need help shopping? No   Do you need help preparing meals?  No   Do you need help with housework?  No   Do you need help with laundry? No   Do you need help taking your medications? No   Do you need help managing money? No   Do you ever drive or ride in a car without wearing a seat belt? No   Have you felt unusual stress, anger or loneliness in the last month? No   Who do you live with? Spouse   If you need help, do you have trouble finding someone available to you? No   Have you been bothered in the last four weeks by sexual problems? No   Do you have difficulty concentrating, remembering or making decisions? No       Age-appropriate Screening Schedule:  Refer to the list below for future screening recommendations based on patient's age, sex and/or medical conditions. Orders for these recommended tests are listed in the plan section. The patient has been provided with a written plan.    Health Maintenance   Topic Date Due   • TDAP/TD VACCINES (1 - Tdap) Never done   • ZOSTER VACCINE (2 of 2) 12/03/2021   • INFLUENZA VACCINE  Completed              Assessment/Plan   CMS Preventative  Services Quick Reference  Risk Factors Identified During Encounter  Immunizations Discussed/Encouraged (specific Immunizations; Influenza and Shingrix  The above risks/problems have been discussed with the patient.  Follow up actions/plans if indicated are seen below in the Assessment/Plan Section.  Pertinent information has been shared with the patient in the After Visit Summary.    Diagnoses and all orders for this visit:    1. Medicare annual wellness visit, subsequent (Primary)   -Recent labs reviewed.  No need to repeat today   -Counseled regarding exercise and preventative health maintenance items/immunizations below    2. Prostate cancer (HCC): diagnosed 2009, s/p radioactive seed placement in 2010.  March 2022 PSA undetectable   -Monitor    3. Atrial flutter, unspecified type (HCC): s/p cardioversion in 2008.  No evidence for recurrence   -Monitor   -Continue cardiology [id number]    4. Gastroesophageal reflux disease, unspecified whether esophagitis present   -Continue diet modifications    5. BPH without urinary obstruction  -     Continue tamsulosin (FLOMAX) 0.4 MG capsule 24 hr capsule; Take 1 capsule by mouth Daily.  Dispense: 90 capsule; Refill: 3    6. ED (erectile dysfunction) of organic origin   -Continue Cialis as needed    7. DDD (degenerative disc disease), lumbar: Currently managed with chiropractor   -Consider heat, lidocaine patch, muscle relaxer, physical therapy if not improving    8. Impacted cerumen of right ear  -     Cerumen Removal    Preventative:  Colonoscopy: 2016, due 2026  PSA: undetectable 3/2022. Dx 2009  DEXA: deferred per patient preference  Shingles: Completed Zosatavax; completed 1 of 2 Shingrix  Pneumonia: Pneumovax 2014  Tdap: Record requested (has had in last 10yrs)  Influenza: 10/2021  COVID: Completed 3 shots Pfizer (9/2021)    Follow Up:   Return in about 1 year (around 3/18/2023) for Medicare Wellness.     An After Visit Summary and PPPS were made available to the  patient.

## 2022-05-24 ENCOUNTER — OFFICE VISIT (OUTPATIENT)
Dept: CARDIOLOGY | Facility: CLINIC | Age: 87
End: 2022-05-24

## 2022-05-24 VITALS
HEIGHT: 65 IN | HEART RATE: 51 BPM | OXYGEN SATURATION: 99 % | SYSTOLIC BLOOD PRESSURE: 155 MMHG | DIASTOLIC BLOOD PRESSURE: 82 MMHG | BODY MASS INDEX: 22.82 KG/M2 | WEIGHT: 137 LBS

## 2022-05-24 DIAGNOSIS — R00.1 BRADYCARDIA, SINUS: ICD-10-CM

## 2022-05-24 DIAGNOSIS — I10 ESSENTIAL HYPERTENSION: ICD-10-CM

## 2022-05-24 DIAGNOSIS — I49.5 TACHYCARDIA-BRADYCARDIA: Primary | ICD-10-CM

## 2022-05-24 DIAGNOSIS — I48.92 ATRIAL FLUTTER WITH CONTROLLED RESPONSE: ICD-10-CM

## 2022-05-24 PROCEDURE — 99214 OFFICE O/P EST MOD 30 MIN: CPT | Performed by: INTERNAL MEDICINE

## 2022-05-24 PROCEDURE — 93000 ELECTROCARDIOGRAM COMPLETE: CPT | Performed by: INTERNAL MEDICINE

## 2022-05-24 NOTE — PROGRESS NOTES
Encounter Date:05/24/2022  Last seen 11/23/2021      Patient ID: Henok Beasley is a 86 y.o. male.    Chief Complaint:    Tachycardia bradycardia syndrome  History of atrial flutter.  History of sinus bradycardia  Elevated blood pressure.  Incomplete right bundle branch block        History of Present Illness     Since I have last seen, the patient has been without any chest discomfort ,shortness of breath, palpitations, dizziness or syncope.  Denies having any headache ,abdominal pain ,nausea, vomiting , diarrhea constipation, loss of weight or loss of appetite.  Denies having any excessive bruising ,hematuria or blood in the stool.    Review of all systems negative except as indicated.    Reviewed ROS.    Assessment and Plan         [[[[[[[[[[[[[[[[[[[[[[[[   impression  ====================  - Tachy-lolis syndrome.  History of atrial flutter.  Patient is maintaining sinus rhythm     Status post electrical cardioversion and patient had significant sinus pauses after cardioversion in May of 2006. Patient had significant  sinus bradycardia and syncope and Toprol was discontinued with improvement of symptoms.       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  Minimal mitral and aortic regurgitation     - Chronic and incomplete right bundle-branch block       -Elevated blood pressure       -History of allergy to horse serum      -Status post hernia repair and right knee surgery.     ====================  Plan  =================  Tachybradycardia syndrome-stable.     Chronic and incomplete right bundle branch block-stable and asymptomatic  EKG showed sinus bradycardia first-degree AV block left ventricular hypertrophy.-5/24/2022  1 cycle of 2.4-second pause was noted on the EKG in the past.  I have discussed the options with patient that included 30-day event monitor (in the past).  Patient prefers to be observed.     History of elevated blood pressure- 155/80  Patient is not having any angina  pectoris or congestive heart failure.     History of atrial flutter  Status post cardioversion 2006.  Patient is maintaining sinus rhythm.     Medications were reviewed and updated.     Patient is not on any medications to cause bradycardia.     Follow-up in the office in 6 months.     Further plan will depend on patient's progress.  [[[[[[[[[[[[[[[[[[[[[[[[[[[[[[[[[[                  Diagnosis Plan   1. Tachycardia-bradycardia (HCC)  ECG 12 Lead   2. Essential hypertension  ECG 12 Lead   3. Atrial flutter with controlled response (HCC)  ECG 12 Lead   4. Bradycardia, sinus  ECG 12 Lead   LAB RESULTS (LAST 7 DAYS)    CBC        BMP        CMP         BNP        TROPONIN        CoAg        Creatinine Clearance  CrCl cannot be calculated (Patient's most recent lab result is older than the maximum 30 days allowed.).    ABG        Radiology  No radiology results for the last day                The following portions of the patient's history were reviewed and updated as appropriate: allergies, current medications, past family history, past medical history, past social history, past surgical history and problem list.    Review of Systems   Constitutional: Negative for malaise/fatigue.   Cardiovascular: Negative for chest pain, leg swelling, palpitations and syncope.   Respiratory: Negative for shortness of breath.    Skin: Negative for rash.   Gastrointestinal: Negative for nausea and vomiting.   Neurological: Negative for dizziness, light-headedness and numbness.   All other systems reviewed and are negative.        Current Outpatient Medications:   •  Ascorbic Acid (VITAMIN C ER) 1000 MG tablet controlled-release, VITAMIN C ER 1000 MG ORAL TABLET EXTENDED RELEASE, Disp: , Rfl:   •  Ginkgo Biloba 120 MG tablet, GINKOBA TABS, Disp: , Rfl:   •  Glucosamine-Chondroitin 250-200 MG tablet, [facility]S GLUCOSAMINE-CHONDROITIN TABS, Disp: , Rfl:   •  MULTIPLE VITAMIN-FOLIC ACID PO, MULTIVITAMINS TABS, Disp: , Rfl:   •  tadalafil  (Cialis) 20 MG tablet, Take 1 tablet by mouth Daily As Needed for Erectile Dysfunction., Disp: 30 tablet, Rfl: 1  •  tamsulosin (FLOMAX) 0.4 MG capsule 24 hr capsule, Take 1 capsule by mouth Daily., Disp: 90 capsule, Rfl: 3    Allergies   Allergen Reactions   • Other Rash     Horse Serum       Family History   Problem Relation Age of Onset   • Cancer Mother    • Lung cancer Mother         smoker   • Hyperlipidemia Father    • Anuerysm Father         AAA- smoker   • Hyperlipidemia Other    • Aneurysm Other         AAA   • Hyperlipidemia Other    • Stroke Other    • Coronary artery disease Other    • Anuerysm Paternal Uncle         AAA   • Heart attack Daughter    • Heart failure Sister         Hypoplastic right coronary artery       Past Surgical History:   Procedure Laterality Date   • CATARACT EXTRACTION     • HERNIA REPAIR Right 1967, 2013    R inguinal   • KNEE SURGERY Right 1985    R meniscus repair   • MASTOIDECTOMY  1942   • MOHS SURGERY     • TONSILLECTOMY         Past Medical History:   Diagnosis Date   • Abnormal ECG 1984   • Atrial fibrillation (HCC)    • Atrial flutter (HCC)     S/P CARDIOVERSION   • BPH without urinary obstruction    • DDD (degenerative disc disease), lumbar    • ED (erectile dysfunction) of organic origin    • GERD (gastroesophageal reflux disease)    • H/O tachycardia-bradycardia syndrome    • Left ventricular hypertrophy    • Memory loss    • Osteoarthritis    • Prostate cancer (HCC) 2010    radioactive seeds placed, in remission   • RBBB (right bundle branch block)    • Skin cancer     H/O SKIN CANCER   • Spinal stenosis, lumbar        Family History   Problem Relation Age of Onset   • Cancer Mother    • Lung cancer Mother         smoker   • Hyperlipidemia Father    • Anuerysm Father         AAA- smoker   • Hyperlipidemia Other    • Aneurysm Other         AAA   • Hyperlipidemia Other    • Stroke Other    • Coronary artery disease Other    • Anuerysm Paternal Uncle         AAA   •  "Heart attack Daughter    • Heart failure Sister         Hypoplastic right coronary artery       Social History     Socioeconomic History   • Marital status:    Tobacco Use   • Smoking status: Never Smoker   • Smokeless tobacco: Never Used   Vaping Use   • Vaping Use: Never used   Substance and Sexual Activity   • Alcohol use: Yes     Alcohol/week: 6.0 standard drinks     Types: 6 Standard drinks or equivalent per week   • Drug use: Never   • Sexual activity: Yes     Partners: Female     Birth control/protection: Post-menopausal           ECG 12 Lead    Date/Time: 5/24/2022 2:01 PM  Performed by: Calvin Anderson MD  Authorized by: Calvin Anderson MD   Comparison: compared with previous ECG   Similar to previous ECG  Comparison to previous ECG: Sinus bradycardia first-degree AV block nonspecific ST-T wave abnormalities 46/min normal axis no ectopy no change from 11/23/2021                Objective:       Physical Exam    /82 (BP Location: Left arm, Patient Position: Sitting, Cuff Size: Adult)   Pulse 51   Ht 165.1 cm (65\")   Wt 62.1 kg (137 lb)   SpO2 99%   BMI 22.80 kg/m²   The patient is alert, oriented and in no distress.    Vital signs as noted above.    Head and neck revealed no carotid bruits or jugular venous distension.  No thyromegaly or lymphadenopathy is present.    Lungs clear.  No wheezing.  Breath sounds are normal bilaterally.    Heart normal first and second heart sounds.  No murmur..  No pericardial rub is present.  No gallop is present.    Abdomen soft and nontender.  No organomegaly is present.    Extremities revealed good peripheral pulses without any pedal edema.    Skin warm and dry.    Musculoskeletal system is grossly normal.    CNS grossly normal.    Reviewed and updated.      "

## 2022-07-22 ENCOUNTER — TELEPHONE (OUTPATIENT)
Dept: FAMILY MEDICINE CLINIC | Facility: CLINIC | Age: 87
End: 2022-07-22

## 2022-07-22 NOTE — TELEPHONE ENCOUNTER
He may continue to test positive for 3 months. Would not recommend further Paxlovid. Testing is not required for domestic flights nor is he felt to be contagious at this point despite his positive test

## 2022-07-22 NOTE — TELEPHONE ENCOUNTER
Caller: Henok Beasley    Relationship to patient: Self    Best call back number: [phone]    Date of exposure: NO KNOWN EXPOSURE     Date of positive COVID19 test: 07/08/22, OFFICE TEST     Date if possible COVID19 exposure: N/A    COVID19 symptoms: COUGH    Date of initial quarantine: BEEN QUARANTINED FOR 16 DAYS    Additional information or concerns: PATIENT SAID HE TESTED POSITIVE FOR COVID-19 ON 07/08/22 AND IS STILL TESTING POSITIVE     HE SAID HE THINKS IT IS BECAUSE OF THE PAXLOVID REBOUND     HE HAS AIRLINE TICKETS FOR TOMORROW, AND HE IS CONCERNED HE WILL MISS HIS FLIGHT DUE TO STILL TESTING POSITIVE FOR COVID-19 AND TO GET RID OF THE COUGH    HE IS CONSIDERING WHETHER HE NEEDS ANOTHER ROUND OF PAXLOVID    REQUESTED CALLBACK    What is the patients preferred pharmacy: [id number] [place] [facility] [address] - [phone]  - [phone]

## 2023-01-22 NOTE — PROGRESS NOTES
Encounter Date:01/31/2023    Last seen-5/24/2022      Patient ID: Henok Beasley is a 87 y.o. male.    Chief Complaint:    Tachycardia bradycardia syndrome  History of atrial flutter.  History of sinus bradycardia  Elevated blood pressure.  Incomplete right bundle branch block        History of Present Illness  Patient had COVID in July.  Patient's heart rate has been running faster since then.  Patient does not have any feeling of palpitations.     Since I have last seen, the patient has been without any chest discomfort ,shortness of breath, palpitations, dizziness or syncope.  Denies having any headache ,abdominal pain ,nausea, vomiting , diarrhea constipation, loss of weight or loss of appetite.  Denies having any excessive bruising ,hematuria or blood in the stool.    Review of all systems negative except as indicated.    Reviewed ROS.    Assessment and Plan         [[[[[[[[[[[[[[[[[[[[[[[[   impression  ====================  - Tachy-lolis syndrome.  History of atrial flutter.  Patient was maintaining sinus rhythm  Patient is in atrial flutter today 1/31/2023     Status post electrical cardioversion and patient had significant sinus pauses after cardioversion in May of 2006. Patient had significant  sinus bradycardia and syncope and Toprol was discontinued with improvement of symptoms.       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  Minimal mitral and aortic regurgitation     - Chronic and incomplete right bundle-branch block       -Elevated blood pressure       -History of allergy to horse serum      -Status post hernia repair and right knee surgery.     ====================  Plan  =================  Tachy-lolis syndrome.  History of atrial flutter.  Patient was maintaining sinus rhythm  Patient is in atrial flutter today 1/31/2023     Status post electrical cardioversion and patient had significant sinus pauses after cardioversion in May of 2006.  Patient had significant  sinus  bradycardia and syncope and Toprol was discontinued with improvement of symptoms.    Patient heart rate is increased at 105/min.  Patient will be started on Metroprolol tartrate 12.5 mg twice a day.  Observe for any bradycardia.     Chronic and incomplete right bundle branch block-stable and asymptomatic    History of elevated blood pressure-  blood pressure today 149/120 subsequently 130/90  Hopefully metoprolol will help blood pressure.  Patient is not having any angina pectoris or congestive heart failure.     History of atrial flutter  Status post cardioversion 2006.  Patient is maintaining sinus rhythm.     Medications were reviewed and updated.     Follow-up in office in about 2 to 3 weeks with EKG.  Consideration for anticoagulation and cardioversion if patient has persistent atrial flutter.  Observe closely for bradycardia arrhythmias.  If patient has problems with significant bradycardia consideration may have to be given for pacemaker implantation.    Further plan will depend on patient's progress.  [[[[[[[[[[[[[[[[[[[[[[[[[[[[[[[[[[                                Diagnosis Plan   1. Tachycardia-bradycardia (HCC)        2. Essential hypertension        3. Atrial flutter with controlled response (HCC)        4. Bradycardia, sinus        LAB RESULTS (LAST 7 DAYS)    CBC        BMP        CMP         BNP        TROPONIN        CoAg        Creatinine Clearance  CrCl cannot be calculated (Patient's most recent lab result is older than the maximum 30 days allowed.).    ABG        Radiology  No radiology results for the last day                The following portions of the patient's history were reviewed and updated as appropriate: allergies, current medications, past family history, past medical history, past social history, past surgical history and problem list.    Review of Systems   Constitutional: Negative for malaise/fatigue.   Cardiovascular: Negative for chest pain, leg swelling, palpitations and  "syncope.   Respiratory: Positive for shortness of breath (\"a little but has increased\").    Skin: Negative for rash.   Gastrointestinal: Negative for nausea and vomiting.   Neurological: Negative for dizziness, light-headedness and numbness.   All other systems reviewed and are negative.        Current Outpatient Medications:   •  Ascorbic Acid (VITAMIN C ER) 1000 MG tablet controlled-release, VITAMIN C ER 1000 MG ORAL TABLET EXTENDED RELEASE, Disp: , Rfl:   •  Ginkgo Biloba 120 MG tablet, GINKOBA TABS, Disp: , Rfl:   •  Glucosamine-Chondroitin 250-200 MG tablet, CVS GLUCOSAMINE-CHONDROITIN TABS, Disp: , Rfl:   •  MULTIPLE VITAMIN-FOLIC ACID PO, MULTIVITAMINS TABS, Disp: , Rfl:   •  tadalafil (Cialis) 20 MG tablet, Take 1 tablet by mouth Daily As Needed for Erectile Dysfunction., Disp: 30 tablet, Rfl: 1  •  tamsulosin (FLOMAX) 0.4 MG capsule 24 hr capsule, Take 1 capsule by mouth Daily., Disp: 90 capsule, Rfl: 3    Allergies   Allergen Reactions   • Other Rash     Horse Serum       Family History   Problem Relation Age of Onset   • Cancer Mother    • Lung cancer Mother         smoker   • Hyperlipidemia Father    • Anuerysm Father         AAA- smoker   • Hyperlipidemia Other    • Aneurysm Other         AAA   • Hyperlipidemia Other    • Stroke Other    • Coronary artery disease Other    • Anuerysm Paternal Uncle         AAA   • Heart attack Daughter    • Heart failure Sister         Hypoplastic right coronary artery       Past Surgical History:   Procedure Laterality Date   • CATARACT EXTRACTION     • HERNIA REPAIR Right 1967, 2013    R inguinal   • KNEE SURGERY Right 1985    R meniscus repair   • MASTOIDECTOMY  1942   • MOHS SURGERY     • TONSILLECTOMY         Past Medical History:   Diagnosis Date   • Abnormal ECG 1984   • Atrial fibrillation (HCC)    • Atrial flutter (HCC)     S/P CARDIOVERSION   • BPH without urinary obstruction    • DDD (degenerative disc disease), lumbar    • 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    • GERD (gastroesophageal reflux disease)    • H/O tachycardia-bradycardia syndrome    • Left ventricular hypertrophy    • Memory loss    • Osteoarthritis    • Prostate cancer (HCC) 2010    radioactive seeds placed, in remission   • RBBB (right bundle branch block)    • Skin cancer     H/O SKIN CANCER   • Spinal stenosis, lumbar        Family History   Problem Relation Age of Onset   • Cancer Mother    • Lung cancer Mother         smoker   • Hyperlipidemia Father    • Anuerysm Father         AAA- smoker   • Hyperlipidemia Other    • Aneurysm Other         AAA   • Hyperlipidemia Other    • Stroke Other    • Coronary artery disease Other    • Anuerysm Paternal Uncle         AAA   • Heart attack Daughter    • Heart failure Sister         Hypoplastic right coronary artery       Social History     Socioeconomic History   • Marital status:    Tobacco Use   • Smoking status: Never   • Smokeless tobacco: Never   Vaping Use   • Vaping Use: Never used   Substance and Sexual Activity   • Alcohol use: Yes     Alcohol/week: 6.0 standard drinks     Types: 6 Standard drinks or equivalent per week   • Drug use: Never   • Sexual activity: Yes     Partners: Female     Birth control/protection: Post-menopausal           ECG 12 Lead    Date/Time: 1/31/2023 12:14 PM  Performed by: Calvin Anderson MD  Authorized by: Calvin Anderson MD   Comparison: compared with previous ECG   Comparison to previous ECG: Atrial flutter with 21 AV conduction 105/min nonspecific ST-T wave changes left ventricular hypertrophy normal axis normal intervals.  No significant change from 5/24/2022.  Patient was in sinus bradycardia.                Objective:       Physical Exam    /91 (BP Location: Right arm, Patient Position: Sitting, Cuff Size: Adult) Comment: md notified  Pulse 105   Ht 165.1 cm (65\")   Wt 61.2 kg (135 lb)   BMI 22.47 kg/m²   The patient is alert, oriented and in no distress.    Vital signs as noted " above.    Head and neck revealed no carotid bruits or jugular venous distension.  No thyromegaly or lymphadenopathy is present.    Lungs clear.  No wheezing.  Breath sounds are normal bilaterally.    Heart normal first and second heart sounds.  No murmur..  No pericardial rub is present.  No gallop is present.    Abdomen soft and nontender.  No organomegaly is present.    Extremities revealed good peripheral pulses without any pedal edema.    Skin warm and dry.    Musculoskeletal system is grossly normal.    CNS grossly normal.    Reviewed and updated.

## 2023-01-31 ENCOUNTER — OFFICE VISIT (OUTPATIENT)
Dept: CARDIOLOGY | Facility: CLINIC | Age: 88
End: 2023-01-31
Payer: MEDICARE

## 2023-01-31 VITALS
WEIGHT: 135 LBS | HEIGHT: 65 IN | HEART RATE: 105 BPM | DIASTOLIC BLOOD PRESSURE: 91 MMHG | BODY MASS INDEX: 22.49 KG/M2 | SYSTOLIC BLOOD PRESSURE: 130 MMHG

## 2023-01-31 DIAGNOSIS — I48.92 ATRIAL FLUTTER WITH CONTROLLED RESPONSE: ICD-10-CM

## 2023-01-31 DIAGNOSIS — I10 ESSENTIAL HYPERTENSION: ICD-10-CM

## 2023-01-31 DIAGNOSIS — R00.1 BRADYCARDIA, SINUS: ICD-10-CM

## 2023-01-31 DIAGNOSIS — I49.5 TACHYCARDIA-BRADYCARDIA: Primary | ICD-10-CM

## 2023-01-31 PROCEDURE — 93000 ELECTROCARDIOGRAM COMPLETE: CPT | Performed by: INTERNAL MEDICINE

## 2023-01-31 PROCEDURE — 99214 OFFICE O/P EST MOD 30 MIN: CPT | Performed by: INTERNAL MEDICINE

## 2023-02-15 ENCOUNTER — OFFICE VISIT (OUTPATIENT)
Dept: CARDIOLOGY | Facility: CLINIC | Age: 88
End: 2023-02-15
Payer: MEDICARE

## 2023-02-15 VITALS
HEART RATE: 71 BPM | DIASTOLIC BLOOD PRESSURE: 85 MMHG | OXYGEN SATURATION: 96 % | HEIGHT: 65 IN | BODY MASS INDEX: 22.82 KG/M2 | SYSTOLIC BLOOD PRESSURE: 130 MMHG | WEIGHT: 137 LBS

## 2023-02-15 DIAGNOSIS — I48.92 ATRIAL FLUTTER WITH CONTROLLED RESPONSE: ICD-10-CM

## 2023-02-15 DIAGNOSIS — I49.5 TACHYCARDIA-BRADYCARDIA: ICD-10-CM

## 2023-02-15 DIAGNOSIS — I10 ESSENTIAL HYPERTENSION: ICD-10-CM

## 2023-02-15 DIAGNOSIS — R00.2 PALPITATIONS: Primary | ICD-10-CM

## 2023-02-15 PROCEDURE — 99214 OFFICE O/P EST MOD 30 MIN: CPT | Performed by: INTERNAL MEDICINE

## 2023-02-15 PROCEDURE — 93000 ELECTROCARDIOGRAM COMPLETE: CPT | Performed by: INTERNAL MEDICINE

## 2023-02-15 NOTE — PROGRESS NOTES
Encounter Date:02/15/2023  Last seen 1/31/2023      Patient ID: Henok Beasley is a 87 y.o. male.    Chief Complaint:  Tachycardia bradycardia syndrome  History of atrial flutter.  History of sinus bradycardia  Elevated blood pressure.  Incomplete right bundle branch block        History of Present Illness  Patient had COVID in July.  Patient's heart rate has been running faster since then.  Patient does not have any feeling of palpitations.  Patient is tolerating metoprolol 12.5 mg twice a day.     Since I have last seen, the patient has been without any chest discomfort ,shortness of breath, palpitations, dizziness or syncope.  Denies having any headache ,abdominal pain ,nausea, vomiting , diarrhea constipation, loss of weight or loss of appetite.  Denies having any excessive bruising ,hematuria or blood in the stool.     Review of all systems negative except as indicated.     Reviewed ROS.    Assessment and Plan         [[[[[[[[[[[[[[[[[[[[[[[[   impression  ====================  - Tachy-lolis syndrome.  History of atrial flutter.  Patient was maintaining sinus rhythm  Patient is in atrial flutter today 1/31/2023               Status post electrical cardioversion and patient had significant sinus pauses after cardioversion in May of 2006. Patient had significant  sinus bradycardia and syncope and Toprol was discontinued with improvement of symptoms.       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  Minimal mitral and aortic regurgitation     - Chronic and incomplete right bundle-branch block       -Elevated blood pressure       -History of allergy to horse serum      -Status post hernia repair and right knee surgery.     ====================  Plan  =================  Tachy-lolis syndrome.  History of atrial flutter.  Patient was maintaining sinus rhythm  Patient is in atrial flutter 1/31/2023            Atrial flutter-EKG- 2/15/2023     Status post electrical cardioversion and patient had  significant sinus pauses after cardioversion in May of 2006.  Patient had significant  sinus bradycardia and syncope and Toprol was discontinued with improvement of symptoms.     Continue on Metroprolol tartrate 12.5 mg twice a day.  Observe for any bradycardia.     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  blood pressure today 130/85  Continue metoprolol.  Patient is not having any angina pectoris or congestive heart failure.     Medications were reviewed and updated.    Options were discussed.  Patient will be started on anticoagulation (anticoagulation choice was discussed).  Patient was started on Coumadin 5 mg tablet half a tablet today.  Patient to have PT/INR performed in 2 to 3 days secondary to acidosis.    Follow-up in the office in about 3 to 4 weeks.  With an echocardiogram and EKG.  If patient has persistent atrial flutter consideration will be given for cardioversion.  Patient does understand the possibility of bradycardia with cardioversion and may require pacemaker implantation.  Other option of ablation was also discussed.     Further plan will depend on patient's progress.  [[[[[[[[[[[[[[[[[[[[[[[[[[[[[[[[[[                                      Diagnosis Plan   1. Palpitations  Adult Transthoracic Echo Complete W/ Cont if Necessary Per Protocol      2. Tachycardia-bradycardia (HCC)  Adult Transthoracic Echo Complete W/ Cont if Necessary Per Protocol      3. Essential hypertension  Adult Transthoracic Echo Complete W/ Cont if Necessary Per Protocol      4. Atrial flutter with controlled response (HCC)  Adult Transthoracic Echo Complete W/ Cont if Necessary Per Protocol      LAB RESULTS (LAST 7 DAYS)    CBC        BMP        CMP         BNP        TROPONIN        CoAg        Creatinine Clearance  CrCl cannot be calculated (Patient's most recent lab result is older than the maximum 30 days allowed.).    ABG         Radiology  No radiology results for the last day                The following portions of the patient's history were reviewed and updated as appropriate: allergies, current medications, past family history, past medical history, past social history, past surgical history and problem list.    Review of Systems   Constitutional: Negative for malaise/fatigue.   Cardiovascular: Negative for chest pain, dyspnea on exertion, leg swelling and palpitations.   Respiratory: Negative for cough and shortness of breath.    Gastrointestinal: Negative for abdominal pain, nausea and vomiting.   Neurological: Negative for dizziness, focal weakness, headaches, light-headedness and numbness.   All other systems reviewed and are negative.        Current Outpatient Medications:   •  Ascorbic Acid (VITAMIN C ER) 1000 MG tablet controlled-release, VITAMIN C ER 1000 MG ORAL TABLET EXTENDED RELEASE, Disp: , Rfl:   •  Ginkgo Biloba 120 MG tablet, GINKOBA TABS, Disp: , Rfl:   •  Glucosamine-Chondroitin 250-200 MG tablet, [facility]S GLUCOSAMINE-CHONDROITIN TABS, Disp: , Rfl:   •  metoprolol tartrate (LOPRESSOR) 25 MG tablet, Take 0.5 tablets by mouth Every 12 (Twelve) Hours., Disp: , Rfl:   •  MULTIPLE VITAMIN-FOLIC ACID PO, MULTIVITAMINS TABS, Disp: , Rfl:   •  tadalafil (Cialis) 20 MG tablet, Take 1 tablet by mouth Daily As Needed for Erectile Dysfunction., Disp: 30 tablet, Rfl: 1  •  tamsulosin (FLOMAX) 0.4 MG capsule 24 hr capsule, Take 1 capsule by mouth Daily., Disp: 90 capsule, Rfl: 3    Allergies   Allergen Reactions   • Other Rash     Horse Serum       Family History   Problem Relation Age of Onset   • Cancer Mother    • Lung cancer Mother         smoker   • Hyperlipidemia Father    • Anuerysm Father         AAA- smoker   • Hyperlipidemia Other    • Aneurysm Other         AAA   • Hyperlipidemia Other    • Stroke Other    • Coronary artery disease Other    • Anuerysm Paternal Uncle         AAA   • Heart attack Daughter    • Heart failure  Sister         Hypoplastic right coronary artery       Past Surgical History:   Procedure Laterality Date   • CATARACT EXTRACTION     • HERNIA REPAIR Right 1967, 2013    R inguinal   • KNEE SURGERY Right 1985    R meniscus repair   • MASTOIDECTOMY  1942   • MOHS SURGERY     • TONSILLECTOMY         Past Medical History:   Diagnosis Date   • Abnormal ECG 1984   • Atrial fibrillation (HCC)    • Atrial flutter (HCC)     S/P CARDIOVERSION   • BPH without urinary obstruction    • DDD (degenerative disc disease), lumbar    • ED (erectile dysfunction) of organic origin    • GERD (gastroesophageal reflux disease)    • H/O tachycardia-bradycardia syndrome    • Left ventricular hypertrophy    • Memory loss    • Osteoarthritis    • Prostate cancer (HCC) 2010    radioactive seeds placed, in remission   • RBBB (right bundle branch block)    • Skin cancer     H/O SKIN CANCER   • Spinal stenosis, lumbar        Family History   Problem Relation Age of Onset   • Cancer Mother    • Lung cancer Mother         smoker   • Hyperlipidemia Father    • Anuerysm Father         AAA- smoker   • Hyperlipidemia Other    • Aneurysm Other         AAA   • Hyperlipidemia Other    • Stroke Other    • Coronary artery disease Other    • Anuerysm Paternal Uncle         AAA   • Heart attack Daughter    • Heart failure Sister         Hypoplastic right coronary artery       Social History     Socioeconomic History   • Marital status:    Tobacco Use   • Smoking status: Never   • Smokeless tobacco: Never   Vaping Use   • Vaping Use: Never used   Substance and Sexual Activity   • Alcohol use: Yes     Alcohol/week: 6.0 standard drinks     Types: 6 Shots of liquor per week   • Drug use: Never   • Sexual activity: Yes     Partners: Female     Birth control/protection: Post-menopausal, Vasectomy           ECG 12 Lead    Date/Time: 2/15/2023 1:39 PM  Performed by: Calvin Anderson MD  Authorized by: Calvin Anderson MD   Comparison: compared with previous  "ECG   Similar to previous ECG  Comparison to previous ECG: Atrial flutter with controlled ventricular response 80/min nonspecific ST-T wave changes normal axis normal intervals compared to 1/31/2023 ventricular rate is slower.                Objective:       Physical Exam    /85 (BP Location: Right arm, Patient Position: Sitting, Cuff Size: Adult)   Pulse 71   Ht 165.1 cm (65\")   Wt 62.1 kg (137 lb)   SpO2 96%   BMI 22.80 kg/m²   The patient is alert, oriented and in no distress.    Vital signs as noted above.    Head and neck revealed no carotid bruits or jugular venous distension.  No thyromegaly or lymphadenopathy is present.    Lungs clear.  No wheezing.  Breath sounds are normal bilaterally.    Heart normal first and second heart sounds.  No murmur..  No pericardial rub is present.  No gallop is present.    Abdomen soft and nontender.  No organomegaly is present.    Extremities revealed good peripheral pulses without any pedal edema.    Skin warm and dry.    Musculoskeletal system is grossly normal.    CNS grossly normal.    Reviewed and updated.        "

## 2023-02-17 ENCOUNTER — ANTICOAGULATION VISIT (OUTPATIENT)
Dept: CARDIOLOGY | Facility: CLINIC | Age: 88
End: 2023-02-17
Payer: MEDICARE

## 2023-02-17 VITALS
SYSTOLIC BLOOD PRESSURE: 161 MMHG | HEART RATE: 88 BPM | WEIGHT: 139 LBS | BODY MASS INDEX: 23.13 KG/M2 | DIASTOLIC BLOOD PRESSURE: 99 MMHG

## 2023-02-17 DIAGNOSIS — Z79.01 LONG TERM (CURRENT) USE OF ANTICOAGULANTS: ICD-10-CM

## 2023-02-17 DIAGNOSIS — I48.92 ATRIAL FLUTTER, UNSPECIFIED TYPE: Primary | ICD-10-CM

## 2023-02-17 LAB — INR PPP: 1.1 (ref 0.9–1.1)

## 2023-02-17 PROCEDURE — 85610 PROTHROMBIN TIME: CPT | Performed by: INTERNAL MEDICINE

## 2023-02-17 PROCEDURE — 36416 COLLJ CAPILLARY BLOOD SPEC: CPT | Performed by: INTERNAL MEDICINE

## 2023-02-23 ENCOUNTER — ANTICOAGULATION VISIT (OUTPATIENT)
Dept: CARDIOLOGY | Facility: CLINIC | Age: 88
End: 2023-02-23
Payer: MEDICARE

## 2023-02-23 VITALS
BODY MASS INDEX: 22.63 KG/M2 | WEIGHT: 136 LBS | SYSTOLIC BLOOD PRESSURE: 148 MMHG | DIASTOLIC BLOOD PRESSURE: 85 MMHG | HEART RATE: 60 BPM

## 2023-02-23 DIAGNOSIS — I48.92 ATRIAL FLUTTER, UNSPECIFIED TYPE: Primary | ICD-10-CM

## 2023-02-23 DIAGNOSIS — Z79.01 LONG TERM (CURRENT) USE OF ANTICOAGULANTS: ICD-10-CM

## 2023-02-23 LAB — INR PPP: 1.5 (ref 0.9–1.1)

## 2023-02-23 PROCEDURE — 85610 PROTHROMBIN TIME: CPT | Performed by: INTERNAL MEDICINE

## 2023-02-23 PROCEDURE — 36416 COLLJ CAPILLARY BLOOD SPEC: CPT | Performed by: INTERNAL MEDICINE

## 2023-03-02 ENCOUNTER — ANTICOAGULATION VISIT (OUTPATIENT)
Dept: CARDIOLOGY | Facility: CLINIC | Age: 88
End: 2023-03-02
Payer: MEDICARE

## 2023-03-02 ENCOUNTER — TELEPHONE (OUTPATIENT)
Dept: CARDIOLOGY | Facility: CLINIC | Age: 88
End: 2023-03-02

## 2023-03-02 VITALS
DIASTOLIC BLOOD PRESSURE: 105 MMHG | WEIGHT: 137 LBS | HEART RATE: 81 BPM | BODY MASS INDEX: 22.8 KG/M2 | SYSTOLIC BLOOD PRESSURE: 133 MMHG

## 2023-03-02 DIAGNOSIS — Z79.01 LONG TERM (CURRENT) USE OF ANTICOAGULANTS: ICD-10-CM

## 2023-03-02 DIAGNOSIS — I48.92 ATRIAL FLUTTER, UNSPECIFIED TYPE: Primary | ICD-10-CM

## 2023-03-02 LAB — INR PPP: 2 (ref 0.9–1.1)

## 2023-03-02 PROCEDURE — 85610 PROTHROMBIN TIME: CPT | Performed by: INTERNAL MEDICINE

## 2023-03-02 PROCEDURE — 36416 COLLJ CAPILLARY BLOOD SPEC: CPT | Performed by: INTERNAL MEDICINE

## 2023-03-02 NOTE — TELEPHONE ENCOUNTER
REPORTING 3/2/23 /89  HR 91 STATES HE WAS HERE THIS MORNING AND TOLD TO GO HOME AND RETAKE BP FOR [facility] OR [facility]

## 2023-03-02 NOTE — TELEPHONE ENCOUNTER
Called [facility] for pt to return call with any questions or proceed as discussed in office ( keeping a log and bringing it to next weeks appt) [place]

## 2023-03-04 NOTE — PROGRESS NOTES
Encounter Date:03/09/2023    Last seen 2/15/2023      Patient ID: Henok Beasley is a 87 y.o. male.    Chief Complaint:      Tachycardia bradycardia syndrome  History of atrial flutter.  History of sinus bradycardia  Elevated blood pressure.  Incomplete right bundle branch block        History of Present Illness  Patient recently was seen with following history.  Reviewed and updated.    Patient had COVID in July.  Patient's heart rate has been running faster since then.  Patient does not have any feeling of palpitations.  Patient is tolerating metoprolol 12.5 mg twice a day.     Since I have last seen, the patient has been without any chest discomfort ,shortness of breath, palpitations, dizziness or syncope.  Denies having any headache ,abdominal pain ,nausea, vomiting , diarrhea constipation, loss of weight or loss of appetite.  Denies having any excessive bruising ,hematuria or blood in the stool.     Review of all systems negative except as indicated.     Reviewed ROS.    Assessment and Plan         [[[[[[[[[[[[[[[[[[[[[[[[   impression  ====================  - Tachy-lolis syndrome.  History of atrial flutter.  Patient was maintaining sinus rhythm  Patient is in atrial flutter today 1/31/2023               Status post electrical cardioversion and patient had significant sinus pauses after cardioversion in May of 2006. Patient had significant  sinus bradycardia and syncope and Toprol was discontinued with improvement of symptoms.     Echocardiogram 3/9/2023 revealed   Biatrial enlargement.  Concentric left ventricle hypertrophy.  Right ventricle enlargement.  Structurally and functionally normal cardiac valves.  Left ventricular size and contractility is normal with ejection fraction of 60%.     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  Minimal mitral and aortic regurgitation     - Chronic and incomplete right bundle-branch block       -Elevated blood pressure       -History of allergy to  horse serum      -Status post hernia repair and right knee surgery.     ====================  Plan  =================  Tachy-lolis syndrome.  History of atrial flutter.  Patient was maintaining sinus rhythm  Patient is in atrial flutter 1/31/2023            Atrial flutter-EKG- 2/15/2023  EKG 3/9/2023-atrial flutter with controlled ventricular response     Status post electrical cardioversion and patient had significant sinus pauses after cardioversion in May of 2006.  Patient had significant  sinus bradycardia and syncope and Toprol was discontinued with improvement of symptoms.     Continue on Metroprolol tartrate 12.5 mg twice a day.  Observe for any bradycardia.     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  blood pressure today 130/85  Continue metoprolol.  Patient is not having any angina pectoris or congestive heart failure.     Medications were reviewed and updated.     Anticoagulation-continue Coumadin.  PT/INR on a monthly basis.  INR today 2.0.    Echocardiogram 3/9/2023  Biatrial enlargement.  Concentric left ventricle hypertrophy.  Right ventricle enlargement.  Structurally and functionally normal cardiac valves.  Left ventricular size and contractility is normal with ejection fraction of 60%.    Results of echocardiogram were discussed and educated patient.  EKG showed atrial flutter with controlled ventricular response.  Options were discussed.  Continuation of anticoagulation and observation since patient is asymptomatic-patient's preference.  Consider cardioversion/ablation.  Consideration of treatment and observation unless patient develops symptoms.    Follow-up in the office in 3 months.    Further plan will depend on patient's progress.  [[[[[[[[[[[[[[[[[[[[[[[[[[[[[[[[[[                                      Diagnosis Plan   1. Atrial flutter, unspecified type (HCC)        2. Long term (current) use of  anticoagulants        3. Tachycardia-bradycardia (HCC)        4. Essential hypertension        5. Atrial flutter with controlled response (HCC)        6. Palpitations        7. Bradycardia, sinus        LAB RESULTS (LAST 7 DAYS)    CBC        BMP        CMP         BNP        TROPONIN        CoAg  Results from last 7 days   Lab Units 03/09/23  1122   INR  2.60*       Creatinine Clearance  CrCl cannot be calculated (Patient's most recent lab result is older than the maximum 30 days allowed.).    ABG        Radiology  No radiology results for the last day                The following portions of the patient's history were reviewed and updated as appropriate: allergies, current medications, past family history, past medical history, past social history, past surgical history and problem list.    Review of Systems   Constitutional: Negative for malaise/fatigue.   Cardiovascular: Negative for chest pain, leg swelling, palpitations and syncope.   Respiratory: Negative for shortness of breath.    Skin: Negative for rash.   Gastrointestinal: Negative for nausea and vomiting.   Neurological: Negative for dizziness, light-headedness and numbness.   All other systems reviewed and are negative.        Current Outpatient Medications:   •  Ascorbic Acid (VITAMIN C ER) 1000 MG tablet controlled-release, VITAMIN C ER 1000 MG ORAL TABLET EXTENDED RELEASE, Disp: , Rfl:   •  Ginkgo Biloba 120 MG tablet, GINKOBA TABS, Disp: , Rfl:   •  Glucosamine-Chondroitin 250-200 MG tablet, [facility] GLUCOSAMINE-CHONDROITIN TABS, Disp: , Rfl:   •  metoprolol tartrate (LOPRESSOR) 25 MG tablet, Take 0.5 tablets by mouth Every 12 (Twelve) Hours., Disp: , Rfl:   •  MULTIPLE VITAMIN-FOLIC ACID PO, MULTIVITAMINS TABS, Disp: , Rfl:   •  tadalafil (Cialis) 20 MG tablet, Take 1 tablet by mouth Daily As Needed for Erectile Dysfunction., Disp: 30 tablet, Rfl: 1  •  tamsulosin (FLOMAX) 0.4 MG capsule 24 hr capsule, Take 1 capsule by mouth Daily., Disp: 90 capsule,  Rfl: 3  •  warfarin (COUMADIN) 5 MG tablet, Take 0.5 tablets by mouth Daily., Disp: , Rfl:     Allergies   Allergen Reactions   • Other Rash     Horse Serum       Family History   Problem Relation Age of Onset   • Cancer Mother    • Lung cancer Mother         smoker   • Hyperlipidemia Father    • Anuerysm Father         AAA- smoker   • Hyperlipidemia Other    • Aneurysm Other         AAA   • Hyperlipidemia Other    • Stroke Other    • Coronary artery disease Other    • Anuerysm Paternal Uncle         AAA   • Heart attack Daughter    • Heart failure Sister         Hypoplastic right coronary artery       Past Surgical History:   Procedure Laterality Date   • CATARACT EXTRACTION     • HERNIA REPAIR Right 1967, 2013    R inguinal   • KNEE SURGERY Right 1985    R meniscus repair   • MASTOIDECTOMY  1942   • MOHS SURGERY     • TONSILLECTOMY         Past Medical History:   Diagnosis Date   • Abnormal ECG 1984   • Atrial fibrillation (HCC)    • Atrial flutter (HCC)     S/P CARDIOVERSION   • BPH without urinary obstruction    • DDD (degenerative disc disease), lumbar    • ED (erectile dysfunction) of organic origin    • GERD (gastroesophageal reflux disease)    • H/O tachycardia-bradycardia syndrome    • Left ventricular hypertrophy    • Memory loss    • Osteoarthritis    • Prostate cancer (HCC) 2010    radioactive seeds placed, in remission   • RBBB (right bundle branch block)    • Skin cancer     H/O SKIN CANCER   • Spinal stenosis, lumbar        Family History   Problem Relation Age of Onset   • Cancer Mother    • Lung cancer Mother         smoker   • Hyperlipidemia Father    • Anuerysm Father         AAA- smoker   • Hyperlipidemia Other    • Aneurysm Other         AAA   • Hyperlipidemia Other    • Stroke Other    • Coronary artery disease Other    • Anuerysm Paternal Uncle         AAA   • Heart attack Daughter    • Heart failure Sister         Hypoplastic right coronary artery       Social History     Socioeconomic  "History   • Marital status:    Tobacco Use   • Smoking status: Never     Passive exposure: Never   • Smokeless tobacco: Never   Vaping Use   • Vaping Use: Never used   Substance and Sexual Activity   • Alcohol use: Yes     Alcohol/week: 6.0 standard drinks     Types: 6 Shots of liquor per week   • Drug use: Never   • Sexual activity: Yes     Partners: Female     Birth control/protection: Post-menopausal, Vasectomy           ECG 12 Lead    Date/Time: 3/9/2023 12:04 PM  Performed by: Calvin Anderson MD  Authorized by: Calvin Anderson MD   Comparison: compared with previous ECG   Similar to previous ECG  Comparison to previous ECG: Atrial flutter with controlled ventricular response 67/min nonspecific ST-T wave changes normal axis normal intervals no ectopy no significant change from previous EKG.                Objective:       Physical Exam    /85   Pulse 71   Ht 165.1 cm (65\")   Wt 62.1 kg (137 lb)   SpO2 96%   BMI 22.80 kg/m²   The patient is alert, oriented and in no distress.    Vital signs as noted above.    Head and neck revealed no carotid bruits or jugular venous distension.  No thyromegaly or lymphadenopathy is present.    Lungs clear.  No wheezing.  Breath sounds are normal bilaterally.    Heart normal first and second heart sounds.  No murmur..  No pericardial rub is present.  No gallop is present.    Abdomen soft and nontender.  No organomegaly is present.    Extremities revealed good peripheral pulses without any pedal edema.    Skin warm and dry.    Musculoskeletal system is grossly normal.    CNS grossly normal.    Reviewed and updated.        "

## 2023-03-09 ENCOUNTER — OFFICE VISIT (OUTPATIENT)
Dept: CARDIOLOGY | Facility: CLINIC | Age: 88
End: 2023-03-09
Payer: MEDICARE

## 2023-03-09 ENCOUNTER — ANTICOAGULATION VISIT (OUTPATIENT)
Dept: CARDIOLOGY | Facility: CLINIC | Age: 88
End: 2023-03-09
Payer: MEDICARE

## 2023-03-09 ENCOUNTER — HOSPITAL ENCOUNTER (OUTPATIENT)
Dept: CARDIOLOGY | Facility: HOSPITAL | Age: 88
Discharge: HOME | End: 2023-03-09
Admitting: INTERNAL MEDICINE
Payer: MEDICARE

## 2023-03-09 VITALS
OXYGEN SATURATION: 96 % | HEIGHT: 65 IN | WEIGHT: 137 LBS | BODY MASS INDEX: 22.82 KG/M2 | HEART RATE: 71 BPM | SYSTOLIC BLOOD PRESSURE: 127 MMHG | DIASTOLIC BLOOD PRESSURE: 85 MMHG

## 2023-03-09 VITALS
DIASTOLIC BLOOD PRESSURE: 85 MMHG | BODY MASS INDEX: 22.8 KG/M2 | OXYGEN SATURATION: 96 % | SYSTOLIC BLOOD PRESSURE: 127 MMHG | WEIGHT: 137 LBS | HEART RATE: 71 BPM

## 2023-03-09 DIAGNOSIS — I48.92 ATRIAL FLUTTER, UNSPECIFIED TYPE: Primary | ICD-10-CM

## 2023-03-09 DIAGNOSIS — I48.92 ATRIAL FLUTTER WITH CONTROLLED RESPONSE: ICD-10-CM

## 2023-03-09 DIAGNOSIS — I49.5 TACHYCARDIA-BRADYCARDIA: ICD-10-CM

## 2023-03-09 DIAGNOSIS — R00.1 BRADYCARDIA, SINUS: ICD-10-CM

## 2023-03-09 DIAGNOSIS — I10 ESSENTIAL HYPERTENSION: ICD-10-CM

## 2023-03-09 DIAGNOSIS — R00.2 PALPITATIONS: ICD-10-CM

## 2023-03-09 DIAGNOSIS — Z79.01 LONG TERM (CURRENT) USE OF ANTICOAGULANTS: ICD-10-CM

## 2023-03-09 LAB
BH CV ECHO MEAS - ACS: 2.07 CM
BH CV ECHO MEAS - AO MAX PG: 2.04 MMHG
BH CV ECHO MEAS - AO MEAN PG: 1.13 MMHG
BH CV ECHO MEAS - AO ROOT DIAM: 3.6 CM
BH CV ECHO MEAS - AO V2 MAX: 71.5 CM/SEC
BH CV ECHO MEAS - AO V2 VTI: 11.6 CM
BH CV ECHO MEAS - AVA(I,D): 2.5 CM2
BH CV ECHO MEAS - EDV(CUBED): 68.5 ML
BH CV ECHO MEAS - EDV(MOD-SP4): 29.1 ML
BH CV ECHO MEAS - EF(MOD-BP): 50 %
BH CV ECHO MEAS - EF(MOD-SP4): 50.5 %
BH CV ECHO MEAS - ESV(CUBED): 24.3 ML
BH CV ECHO MEAS - ESV(MOD-SP4): 14.4 ML
BH CV ECHO MEAS - FS: 29.2 %
BH CV ECHO MEAS - IVS/LVPW: 1.91 CM
BH CV ECHO MEAS - IVSD: 1.47 CM
BH CV ECHO MEAS - LA DIMENSION: 4.7 CM
BH CV ECHO MEAS - LV DIASTOLIC VOL/BSA (35-75): 17.3 CM2
BH CV ECHO MEAS - LV MASS(C)D: 154.5 GRAMS
BH CV ECHO MEAS - LV MAX PG: 1.05 MMHG
BH CV ECHO MEAS - LV MEAN PG: 0.62 MMHG
BH CV ECHO MEAS - LV SYSTOLIC VOL/BSA (12-30): 8.6 CM2
BH CV ECHO MEAS - LV V1 MAX: 51.1 CM/SEC
BH CV ECHO MEAS - LV V1 VTI: 11.1 CM
BH CV ECHO MEAS - LVIDD: 4.1 CM
BH CV ECHO MEAS - LVIDS: 2.9 CM
BH CV ECHO MEAS - LVOT AREA: 2.6 CM2
BH CV ECHO MEAS - LVOT DIAM: 1.82 CM
BH CV ECHO MEAS - LVPWD: 0.77 CM
BH CV ECHO MEAS - MR MAX PG: 107.8 MMHG
BH CV ECHO MEAS - MR MAX VEL: 519 CM/SEC
BH CV ECHO MEAS - MV A MAX VEL: 54.6 CM/SEC
BH CV ECHO MEAS - MV DEC SLOPE: 446.3 CM/SEC2
BH CV ECHO MEAS - MV DEC TIME: 0.13 MSEC
BH CV ECHO MEAS - MV E MAX VEL: 57.2 CM/SEC
BH CV ECHO MEAS - MV E/A: 1.05
BH CV ECHO MEAS - MV MAX PG: 1.62 MMHG
BH CV ECHO MEAS - MV MEAN PG: 0.83 MMHG
BH CV ECHO MEAS - MV V2 VTI: 12.6 CM
BH CV ECHO MEAS - MVA(VTI): 2.29 CM2
BH CV ECHO MEAS - PA ACC TIME: 0.09 SEC
BH CV ECHO MEAS - PA PR(ACCEL): 38.5 MMHG
BH CV ECHO MEAS - PA V2 MAX: 55.7 CM/SEC
BH CV ECHO MEAS - RAP SYSTOLE: 3 MMHG
BH CV ECHO MEAS - RV MAX PG: 0.65 MMHG
BH CV ECHO MEAS - RV V1 MAX: 40.3 CM/SEC
BH CV ECHO MEAS - RV V1 VTI: 6.7 CM
BH CV ECHO MEAS - RVDD: 2.9 CM
BH CV ECHO MEAS - RVSP: 27.3 MMHG
BH CV ECHO MEAS - SI(MOD-SP4): 8.7 ML/M2
BH CV ECHO MEAS - SV(LVOT): 28.9 ML
BH CV ECHO MEAS - SV(MOD-SP4): 14.7 ML
BH CV ECHO MEAS - TR MAX PG: 24.3 MMHG
BH CV ECHO MEAS - TR MAX VEL: 246.2 CM/SEC
INR PPP: 2.6 (ref 0.9–1.1)
MAXIMAL PREDICTED HEART RATE: 134 BPM
STRESS TARGET HR: 114 BPM

## 2023-03-09 PROCEDURE — 99214 OFFICE O/P EST MOD 30 MIN: CPT | Performed by: INTERNAL MEDICINE

## 2023-03-09 PROCEDURE — 85610 PROTHROMBIN TIME: CPT | Performed by: INTERNAL MEDICINE

## 2023-03-09 PROCEDURE — 93306 TTE W/DOPPLER COMPLETE: CPT

## 2023-03-09 PROCEDURE — 93306 TTE W/DOPPLER COMPLETE: CPT | Performed by: INTERNAL MEDICINE

## 2023-03-09 PROCEDURE — 93000 ELECTROCARDIOGRAM COMPLETE: CPT | Performed by: INTERNAL MEDICINE

## 2023-03-09 PROCEDURE — 36416 COLLJ CAPILLARY BLOOD SPEC: CPT | Performed by: INTERNAL MEDICINE

## 2023-03-09 RX ORDER — WARFARIN SODIUM 5 MG/1
0.5 TABLET ORAL DAILY
COMMUNITY
Start: 2023-02-16

## 2023-03-13 ENCOUNTER — TELEPHONE (OUTPATIENT)
Dept: FAMILY MEDICINE CLINIC | Facility: CLINIC | Age: 88
End: 2023-03-13
Payer: MEDICARE

## 2023-03-13 DIAGNOSIS — Z12.5 ENCOUNTER FOR SCREENING FOR MALIGNANT NEOPLASM OF PROSTATE: ICD-10-CM

## 2023-03-13 DIAGNOSIS — Z00.00 PREVENTATIVE HEALTH CARE: Primary | ICD-10-CM

## 2023-03-13 DIAGNOSIS — E55.9 VITAMIN D DEFICIENCY, UNSPECIFIED: ICD-10-CM

## 2023-03-17 ENCOUNTER — LAB (OUTPATIENT)
Dept: FAMILY MEDICINE CLINIC | Facility: CLINIC | Age: 88
End: 2023-03-17
Payer: MEDICARE

## 2023-03-17 ENCOUNTER — ANTICOAGULATION VISIT (OUTPATIENT)
Dept: CARDIOLOGY | Facility: CLINIC | Age: 88
End: 2023-03-17
Payer: MEDICARE

## 2023-03-17 DIAGNOSIS — Z79.01 LONG TERM (CURRENT) USE OF ANTICOAGULANTS: ICD-10-CM

## 2023-03-17 DIAGNOSIS — I48.92 ATRIAL FLUTTER, UNSPECIFIED TYPE: Primary | ICD-10-CM

## 2023-03-17 LAB
25(OH)D3 SERPL-MCNC: 24.5 NG/ML (ref 30–100)
ALBUMIN SERPL-MCNC: 4.1 G/DL (ref 3.5–5.2)
ALBUMIN/GLOB SERPL: 1.4 G/DL
ALP SERPL-CCNC: 98 U/L (ref 39–117)
ALT SERPL W P-5'-P-CCNC: 43 U/L (ref 1–41)
ANION GAP SERPL CALCULATED.3IONS-SCNC: 10.8 MMOL/L (ref 5–15)
AST SERPL-CCNC: 31 U/L (ref 1–40)
BASOPHILS # BLD AUTO: 0.06 10*3/MM3 (ref 0–0.2)
BASOPHILS NFR BLD AUTO: 1 % (ref 0–1.5)
BILIRUB SERPL-MCNC: 0.9 MG/DL (ref 0–1.2)
BUN SERPL-MCNC: 26 MG/DL (ref 8–23)
BUN/CREAT SERPL: 19.8 (ref 7–25)
CALCIUM SPEC-SCNC: 9.6 MG/DL (ref 8.6–10.5)
CHLORIDE SERPL-SCNC: 98 MMOL/L (ref 98–107)
CHOLEST SERPL-MCNC: 184 MG/DL (ref 0–200)
CO2 SERPL-SCNC: 29.2 MMOL/L (ref 22–29)
CREAT SERPL-MCNC: 1.31 MG/DL (ref 0.76–1.27)
DEPRECATED RDW RBC AUTO: 44 FL (ref 37–54)
EGFRCR SERPLBLD CKD-EPI 2021: 53 ML/MIN/1.73
EOSINOPHIL # BLD AUTO: 0.14 10*3/MM3 (ref 0–0.4)
EOSINOPHIL NFR BLD AUTO: 2.3 % (ref 0.3–6.2)
ERYTHROCYTE [DISTWIDTH] IN BLOOD BY AUTOMATED COUNT: 12.4 % (ref 12.3–15.4)
GLOBULIN UR ELPH-MCNC: 3 GM/DL
GLUCOSE SERPL-MCNC: 90 MG/DL (ref 65–99)
HBA1C MFR BLD: 5.8 % (ref 4.8–5.6)
HCT VFR BLD AUTO: 54.3 % (ref 37.5–51)
HDLC SERPL-MCNC: 51 MG/DL (ref 40–60)
HGB BLD-MCNC: 18.3 G/DL (ref 13–17.7)
IMM GRANULOCYTES # BLD AUTO: 0.01 10*3/MM3 (ref 0–0.05)
IMM GRANULOCYTES NFR BLD AUTO: 0.2 % (ref 0–0.5)
INR PPP: 4.28 (ref 2–3)
LDLC SERPL CALC-MCNC: 107 MG/DL (ref 0–100)
LDLC/HDLC SERPL: 2.03 {RATIO}
LYMPHOCYTES # BLD AUTO: 1.46 10*3/MM3 (ref 0.7–3.1)
LYMPHOCYTES NFR BLD AUTO: 24.5 % (ref 19.6–45.3)
MCH RBC QN AUTO: 32.2 PG (ref 26.6–33)
MCHC RBC AUTO-ENTMCNC: 33.7 G/DL (ref 31.5–35.7)
MCV RBC AUTO: 95.6 FL (ref 79–97)
MONOCYTES # BLD AUTO: 0.63 10*3/MM3 (ref 0.1–0.9)
MONOCYTES NFR BLD AUTO: 10.6 % (ref 5–12)
NEUTROPHILS NFR BLD AUTO: 3.66 10*3/MM3 (ref 1.7–7)
NEUTROPHILS NFR BLD AUTO: 61.4 % (ref 42.7–76)
NRBC BLD AUTO-RTO: 0 /100 WBC (ref 0–0.2)
PLATELET # BLD AUTO: 201 10*3/MM3 (ref 140–450)
PMV BLD AUTO: 10.1 FL (ref 6–12)
POTASSIUM SERPL-SCNC: 4.9 MMOL/L (ref 3.5–5.2)
PROT SERPL-MCNC: 7.1 G/DL (ref 6–8.5)
PROTHROMBIN TIME: 40.6 SECONDS (ref 19.4–28.5)
PSA SERPL-MCNC: <0.014 NG/ML (ref 0–4)
RBC # BLD AUTO: 5.68 10*6/MM3 (ref 4.14–5.8)
SODIUM SERPL-SCNC: 138 MMOL/L (ref 136–145)
T4 FREE SERPL-MCNC: 1.29 NG/DL (ref 0.93–1.7)
TRIGL SERPL-MCNC: 148 MG/DL (ref 0–150)
TSH SERPL DL<=0.05 MIU/L-ACNC: 2.49 UIU/ML (ref 0.27–4.2)
VIT B12 BLD-MCNC: 736 PG/ML (ref 211–946)
VLDLC SERPL-MCNC: 26 MG/DL (ref 5–40)
WBC NRBC COR # BLD: 5.96 10*3/MM3 (ref 3.4–10.8)

## 2023-03-17 PROCEDURE — 85610 PROTHROMBIN TIME: CPT | Performed by: FAMILY MEDICINE

## 2023-03-17 PROCEDURE — 36415 COLL VENOUS BLD VENIPUNCTURE: CPT | Performed by: FAMILY MEDICINE

## 2023-03-17 PROCEDURE — 82306 VITAMIN D 25 HYDROXY: CPT | Performed by: FAMILY MEDICINE

## 2023-03-17 PROCEDURE — 84443 ASSAY THYROID STIM HORMONE: CPT | Performed by: FAMILY MEDICINE

## 2023-03-17 PROCEDURE — 85025 COMPLETE CBC W/AUTO DIFF WBC: CPT | Performed by: FAMILY MEDICINE

## 2023-03-17 PROCEDURE — 82607 VITAMIN B-12: CPT | Performed by: FAMILY MEDICINE

## 2023-03-17 PROCEDURE — 84439 ASSAY OF FREE THYROXINE: CPT | Performed by: FAMILY MEDICINE

## 2023-03-17 PROCEDURE — 83036 HEMOGLOBIN GLYCOSYLATED A1C: CPT | Performed by: FAMILY MEDICINE

## 2023-03-17 PROCEDURE — 80053 COMPREHEN METABOLIC PANEL: CPT | Performed by: FAMILY MEDICINE

## 2023-03-17 PROCEDURE — 80061 LIPID PANEL: CPT | Performed by: FAMILY MEDICINE

## 2023-03-17 PROCEDURE — G0103 PSA SCREENING: HCPCS | Performed by: FAMILY MEDICINE

## 2023-03-18 DIAGNOSIS — N40.0 BPH WITHOUT URINARY OBSTRUCTION: ICD-10-CM

## 2023-03-20 ENCOUNTER — ANTICOAGULATION VISIT (OUTPATIENT)
Dept: CARDIOLOGY | Facility: CLINIC | Age: 88
End: 2023-03-20
Payer: MEDICARE

## 2023-03-20 VITALS
BODY MASS INDEX: 22.8 KG/M2 | HEART RATE: 61 BPM | SYSTOLIC BLOOD PRESSURE: 117 MMHG | WEIGHT: 137 LBS | DIASTOLIC BLOOD PRESSURE: 93 MMHG

## 2023-03-20 DIAGNOSIS — I48.92 ATRIAL FLUTTER, UNSPECIFIED TYPE: Primary | ICD-10-CM

## 2023-03-20 DIAGNOSIS — Z79.01 LONG TERM (CURRENT) USE OF ANTICOAGULANTS: ICD-10-CM

## 2023-03-20 LAB — INR PPP: 1.8 (ref 0.9–1.1)

## 2023-03-20 PROCEDURE — 85610 PROTHROMBIN TIME: CPT | Performed by: INTERNAL MEDICINE

## 2023-03-20 PROCEDURE — 36416 COLLJ CAPILLARY BLOOD SPEC: CPT | Performed by: INTERNAL MEDICINE

## 2023-03-20 RX ORDER — TAMSULOSIN HYDROCHLORIDE 0.4 MG/1
1 CAPSULE ORAL DAILY
Qty: 90 CAPSULE | Refills: 1 | Status: SHIPPED | OUTPATIENT
Start: 2023-03-20

## 2023-03-31 ENCOUNTER — ANTICOAGULATION VISIT (OUTPATIENT)
Dept: CARDIOLOGY | Facility: CLINIC | Age: 88
End: 2023-03-31
Payer: MEDICARE

## 2023-03-31 VITALS
HEART RATE: 70 BPM | BODY MASS INDEX: 23.3 KG/M2 | DIASTOLIC BLOOD PRESSURE: 79 MMHG | SYSTOLIC BLOOD PRESSURE: 133 MMHG | WEIGHT: 140 LBS

## 2023-03-31 DIAGNOSIS — I48.92 ATRIAL FLUTTER, UNSPECIFIED TYPE: Primary | ICD-10-CM

## 2023-03-31 DIAGNOSIS — Z79.01 LONG TERM (CURRENT) USE OF ANTICOAGULANTS: ICD-10-CM

## 2023-03-31 LAB — INR PPP: 2 (ref 0.9–1.1)

## 2023-03-31 PROCEDURE — 36416 COLLJ CAPILLARY BLOOD SPEC: CPT | Performed by: INTERNAL MEDICINE

## 2023-03-31 PROCEDURE — 85610 PROTHROMBIN TIME: CPT | Performed by: INTERNAL MEDICINE

## 2023-04-20 ENCOUNTER — OFFICE VISIT (OUTPATIENT)
Dept: FAMILY MEDICINE CLINIC | Facility: CLINIC | Age: 88
End: 2023-04-20
Payer: MEDICARE

## 2023-04-20 VITALS
DIASTOLIC BLOOD PRESSURE: 72 MMHG | WEIGHT: 136.4 LBS | RESPIRATION RATE: 16 BRPM | SYSTOLIC BLOOD PRESSURE: 110 MMHG | HEART RATE: 91 BPM | BODY MASS INDEX: 22.73 KG/M2 | OXYGEN SATURATION: 98 % | HEIGHT: 65 IN

## 2023-04-20 DIAGNOSIS — N40.0 BPH WITHOUT URINARY OBSTRUCTION: ICD-10-CM

## 2023-04-20 DIAGNOSIS — C61 PROSTATE CANCER: ICD-10-CM

## 2023-04-20 DIAGNOSIS — K21.9 GASTROESOPHAGEAL REFLUX DISEASE, UNSPECIFIED WHETHER ESOPHAGITIS PRESENT: ICD-10-CM

## 2023-04-20 DIAGNOSIS — M48.061 SPINAL STENOSIS OF LUMBAR REGION, UNSPECIFIED WHETHER NEUROGENIC CLAUDICATION PRESENT: ICD-10-CM

## 2023-04-20 DIAGNOSIS — I48.92 ATRIAL FLUTTER, UNSPECIFIED TYPE: ICD-10-CM

## 2023-04-20 DIAGNOSIS — N52.9 ED (ERECTILE DYSFUNCTION) OF ORGANIC ORIGIN: ICD-10-CM

## 2023-04-20 DIAGNOSIS — Z00.00 MEDICARE ANNUAL WELLNESS VISIT, SUBSEQUENT: Primary | ICD-10-CM

## 2023-04-20 NOTE — PROGRESS NOTES
The ABCs of the Annual Wellness Visit  Subsequent Medicare Wellness Visit    Subjective    Henok Beasley is a 87 y.o. male who presents for a Subsequent Medicare Wellness Visit.    The following portions of the patient's history were reviewed and   updated as appropriate: allergies, current medications, past family history, past medical history, past social history, past surgical history and problem list.    Compared to one year ago, the patient feels his physical   health is worse.    Compared to one year ago, the patient feels his mental   health is the same.    Recent Hospitalizations:  He was not admitted to the hospital during the last year.     Current Medical Providers:  Patient Care Team:  Ole Romo MD as PCP - General (Internal Medicine)  Calvin Anderson MD as Consulting Physician (Cardiology)    Outpatient Medications Prior to Visit   Medication Sig Dispense Refill   • Ascorbic Acid (VITAMIN C ER) 1000 MG tablet controlled-release VITAMIN C ER 1000 MG ORAL TABLET EXTENDED RELEASE     • Ginkgo Biloba 120 MG tablet GINKOBA TABS     • Glucosamine-Chondroitin 250-200 MG tablet [facility] GLUCOSAMINE-CHONDROITIN TABS     • metoprolol tartrate (LOPRESSOR) 25 MG tablet Take 0.5 tablets by mouth Every 12 (Twelve) Hours.     • MULTIPLE VITAMIN-FOLIC ACID PO MULTIVITAMINS TABS     • tadalafil (Cialis) 20 MG tablet Take 1 tablet by mouth Daily As Needed for Erectile Dysfunction. 30 tablet 1   • tamsulosin (FLOMAX) 0.4 MG capsule 24 hr capsule TAKE 1 CAPSULE BY MOUTH  DAILY 90 capsule 1   • warfarin (COUMADIN) 5 MG tablet Take 0.5 tablets by mouth Daily.       No facility-administered medications prior to visit.     No opioid medication identified on active medication list. I have reviewed chart for other potential  high risk medication/s and harmful drug interactions in the elderly.        Aspirin is not on active medication list.  Aspirin use is not indicated based on review of current medical condition/s.  "Risk of harm outweighs potential benefits.  .    Patient Active Problem List   Diagnosis   • Atrial flutter   • Degeneration of intervertebral disc of lumbar region   • Enlarged prostate without lower urinary tract symptoms (luts)   • GERD (gastroesophageal reflux disease)   • History of hematuria   • History of malignant neoplasm of prostate   • History of malignant neoplasm of skin   • Hypertension   • Impotence of organic origin   • Left ventricular hypertrophy   • Memory loss   • Osteoarthritis   • RBBB   • Sick sinus syndrome   • Spinal stenosis, lumbar   • Syncope   • Prostate cancer   • ED (erectile dysfunction) of organic origin   • DDD (degenerative disc disease), lumbar   • BPH without urinary obstruction   • Bilateral pseudophakia   • Vitreous opacities   • Long term (current) use of anticoagulants     Advance Care Planning   Advance Care Planning     Advance Directive is on file.  ACP discussion was held with the patient during this visit. Patient has an advance directive in EMR which is still valid.      Objective    Vitals:    04/20/23 1121   BP: 110/72   Pulse: 91   Resp: 16   SpO2: 98%   Weight: 61.9 kg (136 lb 6.4 oz)   Height: 165.1 cm (65\")     Estimated body mass index is 22.7 kg/m² as calculated from the following:    Height as of this encounter: 165.1 cm (65\").    Weight as of this encounter: 61.9 kg (136 lb 6.4 oz).    BMI is within normal parameters. No other follow-up for BMI required.    Does the patient have evidence of cognitive impairment? No    Lab Results   Component Value Date    TRIG 148 03/17/2023    HDL 51 03/17/2023     (H) 03/17/2023    VLDL 26 03/17/2023    HGBA1C 5.80 (H) 03/17/2023        HEALTH RISK ASSESSMENT    Smoking Status:  Social History     Tobacco Use   Smoking Status Never   • Passive exposure: Never   Smokeless Tobacco Never     Alcohol Consumption:  Social History     Substance and Sexual Activity   Alcohol Use Yes   • Alcohol/week: 6.0 standard drinks   • " Types: 6 Shots of liquor per week     Fall Risk Screen:  CARLOS Fall Risk Assessment was completed, and patient is at LOW risk for falls.Assessment completed on:2023    Depression Screenin/20/2023    11:16 AM   PHQ-2/PHQ-9 Depression Screening   Little Interest or Pleasure in Doing Things 0-->not at all   Feeling Down, Depressed or Hopeless 0-->not at all   PHQ-9: Brief Depression Severity Measure Score 0     Health Habits and Functional and Cognitive Screenin/20/2023    11:17 AM   Functional & Cognitive Status   Do you have difficulty preparing food and eating? No   Do you have difficulty bathing yourself, getting dressed or grooming yourself? No   Do you have difficulty using the toilet? No   Do you have difficulty moving around from place to place? No   Do you have trouble with steps or getting out of a bed or a chair? No   Current Diet Well Balanced Diet   Dental Exam Up to date   Eye Exam Up to date   Exercise (times per week) 7 times per week   Current Exercises Include Walking   Do you need help using the phone?  No   Are you deaf or do you have serious difficulty hearing?  No   Do you need help with transportation? No   Do you need help shopping? No   Do you need help preparing meals?  No   Do you need help with housework?  No   Do you need help with laundry? No   Do you need help taking your medications? No   Do you need help managing money? No   Do you ever drive or ride in a car without wearing a seat belt? No   Have you felt unusual stress, anger or loneliness in the last month? Yes   Who do you live with? Spouse   If you need help, do you have trouble finding someone available to you? No   Do you have difficulty concentrating, remembering or making decisions? No     Age-appropriate Screening Schedule:  Refer to the list below for future screening recommendations based on patient's age, sex and/or medical conditions. Orders for these recommended tests are listed in the plan  section. The patient has been provided with a written plan.    Health Maintenance   Topic Date Due   • TDAP/TD VACCINES (1 - Tdap) Never done   • Pneumococcal Vaccine 65+ (2 - PCV) 01/17/2015   • INFLUENZA VACCINE  08/01/2023   • ANNUAL WELLNESS VISIT  04/20/2024   • COVID-19 Vaccine  Completed   • ZOSTER VACCINE  Completed        CMS Preventative Services Quick Reference  Risk Factors Identified During Encounter  Immunizations Discussed/Encouraged: Tdap and Influenza  The above risks/problems have been discussed with the patient.  Pertinent information has been shared with the patient in the After Visit Summary.  An After Visit Summary and PPPS were made available to the patient.    Preventative:  Colonoscopy: 2016, due 2026  PSA: undetectable 3/2023. Dx 2009.  DEXA: deferred per patient preference  Shingles: Completed Zosatavax; Shingrix 3/2022  Pneumonia: Pneumovax 2014  Tdap: Recommended  Influenza: 10/2022, recommended  COVID: Completed 5 shots Pfizer (9/2022) and illness    Follow Up:   Next Medicare Wellness visit to be scheduled in 1 year.       Additional E&M Note during same encounter follows:  Patient has multiple medical problems which are significant and separately identifiable that require additional work above and beyond the Medicare Wellness Visit.      Chief Complaint  Medicare Wellness-subsequent    Subjective        HPI  Henok Beasley is also being seen today for multiple medical problems    Prostate cancer: diagnosed 2009, s/p radioactive seed placement in 2010. Last PSA 3/2022 undetectable. No longer following w/ urology or oncology     Atrial flutter: s/p cardioversion in 2006. Patient did have some tachycardia following COVID last year. He was started back on metoprolol to help improve his mild tachycardia. 3/2023 EKG concerning for a flutter. He has subsequently continued metoprolol 12.5 BID and started warfarin. Currently taking 5mg daily except M/W/F he takes 2.5mg. Managed by Justin. Still w/  "intermittent palpitations. Follows w/ [facility]- [facility]     GERD: rare reflux/heartburn when lying down, mostly managed w/ diet and drinking water. Not taking any medications and prefers not take any.     BPH: generally getting up 2-3 times at night to urinate. No decrease in stream. Maintained on Flomax 1 pill daily and happy w/ control. Prefers not change.      ED: patient reports using vacuum device along w/ Cialis 20mg daily PRN.  Happy with current control     Back pain: patient has been seeing chiropractor regularly but continues to have a fair amount of gluteal pain, R>L. Chiropractor is helpful but still having discomfort. Does regular stretches- yoga every morning. Has not seen PT.     Review of Systems   Constitutional: Negative for chills and fever.   HENT: Negative for congestion, rhinorrhea and trouble swallowing.    Eyes: Negative for visual disturbance.   Respiratory: Negative for cough and shortness of breath.    Cardiovascular: Positive for palpitations. Negative for chest pain.   Gastrointestinal: Negative for abdominal pain and vomiting.        +heartburn   Genitourinary: Negative for difficulty urinating and dysuria.        +nocturia. +ED   Musculoskeletal: Positive for back pain. Negative for arthralgias.   Skin: Negative for rash.   Neurological: Negative for dizziness, light-headedness and numbness.   Psychiatric/Behavioral: Negative for dysphoric mood and sleep disturbance. The patient is not nervous/anxious.      Objective   Vital Signs:  /72   Pulse 91   Resp 16   Ht 165.1 cm (65\")   Wt 61.9 kg (136 lb 6.4 oz)   SpO2 98%   BMI 22.70 kg/m²     Physical Exam  Constitutional:       General: He is not in acute distress.     Appearance: He is well-developed.   HENT:      Head: Normocephalic and atraumatic.      Right Ear: Tympanic membrane and external ear normal. There is no impacted cerumen.      Left Ear: Tympanic membrane and external ear normal. There is no impacted cerumen.      " Nose: Nose normal.      Mouth/Throat:      Mouth: Mucous membranes are moist.      Pharynx: No oropharyngeal exudate or posterior oropharyngeal erythema.   Eyes:      General: No scleral icterus.        Right eye: No discharge.         Left eye: No discharge.      Extraocular Movements: Extraocular movements intact.      Conjunctiva/sclera: Conjunctivae normal.      Pupils: Pupils are equal, round, and reactive to light.   Neck:      Thyroid: No thyromegaly.      Vascular: No carotid bruit.   Cardiovascular:      Rate and Rhythm: Normal rate and regular rhythm.      Heart sounds: Normal heart sounds. No murmur heard.  Pulmonary:      Effort: Pulmonary effort is normal. No respiratory distress.      Breath sounds: Normal breath sounds. No wheezing or rales.   Abdominal:      General: Bowel sounds are normal. There is no distension.      Palpations: Abdomen is soft.      Tenderness: There is no abdominal tenderness.   Musculoskeletal:         General: No deformity. Normal range of motion.      Cervical back: Normal range of motion and neck supple.   Lymphadenopathy:      Cervical: No cervical adenopathy.   Skin:     General: Skin is warm and dry.      Findings: No rash.   Neurological:      General: No focal deficit present.      Mental Status: He is alert and oriented to person, place, and time. Mental status is at baseline.      Cranial Nerves: No cranial nerve deficit.      Sensory: No sensory deficit.   Psychiatric:         Behavior: Behavior normal.         Thought Content: Thought content normal.             Assessment and Plan   Diagnoses and all orders for this visit:    1. Medicare annual wellness visit, subsequent (Primary)   - Recent labs reviewed. No need to repeat   - Counseled regarding exercise and preventative health maintenance items/immunizations below    2. Prostate cancer: diagnosed 2009, s/p radioactive seed placement in 2010. Last PSA 3/2023 undetectable   - Monitor    3. Atrial flutter,  unspecified type: s/p cardioversion in 2006. 3/2023 EKG concerning for aflutter   - Cont home metoprolol 12.5 BID   - Cont home warfarin 2.5mg M/W/F and 5mg remaining days   - Cont [facility] f/u- [facility]    4. Gastroesophageal reflux disease, unspecified whether esophagitis present   - Cont lifestyle modifications    5. BPH without urinary obstruction   - Cont home Flomax 0.4mg daily    6. ED (erectile dysfunction) of organic origin   - Cont penile pump    7. Spinal stenosis of lumbar region, unspecified whether neurogenic claudication present   - Cont yoga   - Consider PT and anti-inflammatory     I spent 55 minutes caring for Henok on this date of service. This time includes time spent by me in the following activities:reviewing tests, performing a medically appropriate examination and/or evaluation , counseling and educating the patient/family/caregiver, ordering medications, tests, or procedures and documenting information in the medical record  Follow Up   Return in about 1 year (around 4/20/2024) for Medicare Wellness.  Patient was given instructions and counseling regarding his condition or for health maintenance advice. Please see specific information pulled into the AVS if appropriate.

## 2023-04-28 ENCOUNTER — ANTICOAGULATION VISIT (OUTPATIENT)
Dept: CARDIOLOGY | Facility: CLINIC | Age: 88
End: 2023-04-28
Payer: MEDICARE

## 2023-04-28 VITALS
WEIGHT: 137 LBS | SYSTOLIC BLOOD PRESSURE: 124 MMHG | HEART RATE: 96 BPM | BODY MASS INDEX: 22.8 KG/M2 | DIASTOLIC BLOOD PRESSURE: 91 MMHG

## 2023-04-28 DIAGNOSIS — I48.92 ATRIAL FLUTTER, UNSPECIFIED TYPE: Primary | ICD-10-CM

## 2023-04-28 DIAGNOSIS — Z79.01 LONG TERM (CURRENT) USE OF ANTICOAGULANTS: ICD-10-CM

## 2023-04-28 LAB — INR PPP: 2.1 (ref 2–3)

## 2023-04-28 PROCEDURE — 85610 PROTHROMBIN TIME: CPT | Performed by: INTERNAL MEDICINE

## 2023-04-28 PROCEDURE — 36416 COLLJ CAPILLARY BLOOD SPEC: CPT | Performed by: INTERNAL MEDICINE

## 2023-05-24 NOTE — PROGRESS NOTES
Encounter Date:06/12/2023  Last seen 3/9/2023      Patient ID: Henok Beasley is a 87 y.o. male.    Chief Complaint:  Tachycardia bradycardia syndrome  History of atrial flutter.  History of sinus bradycardia  Elevated blood pressure.  Incomplete right bundle branch block        History of Present Illness    Patient had COVID in July.  Patient's heart rate has been running faster since then.  Patient does not have any feeling of palpitations.  Patient is tolerating metoprolol 12.5 mg twice a day.     Since I have last seen, the patient has been without any chest discomfort ,shortness of breath, palpitations, dizziness or syncope.  Denies having any headache ,abdominal pain ,nausea, vomiting , diarrhea constipation, loss of weight or loss of appetite.  Denies having any excessive bruising ,hematuria or blood in the stool.    Review of all systems negative except as indicated.    Reviewed ROS.  Assessment and Plan         [[[[[[[[[[[[[[[[[[[[[[[[  History  ====================  - Tachy-lolis syndrome.  History of atrial flutter.  Patient was maintaining sinus rhythm  Patient is in atrial flutter 1/31/2023            Atrial flutter 3/9/2023  Atrial flutter-6/12/2023     Status post electrical cardioversion and patient had significant sinus pauses after cardioversion in May of 2006. Patient had significant  sinus bradycardia and syncope and Toprol was discontinued with improvement of symptoms.      Echocardiogram 3/9/2023 revealed   Biatrial enlargement.  Concentric left ventricle hypertrophy.  Right ventricle enlargement.  Structurally and functionally normal cardiac valves.  Left ventricular size and contractility is normal with ejection fraction of 60%.      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  Minimal mitral and aortic regurgitation     - Chronic and incomplete right bundle-branch block       -Elevated blood pressure       -History of allergy to horse serum      -Status post hernia repair  and right knee surgery.     ====================  Plan  =================  Tachy-lolis syndrome.  History of atrial flutter.  Patient was maintaining sinus rhythm  Patient was in atrial flutter 1/31/2023            Atrial flutter-EKG- 2/15/2023  EKG 3/9/2023-atrial flutter with controlled ventricular response  Atrial flutter with controlled ventricular response-EKG-6/12/2023     Status post electrical cardioversion and patient had significant sinus pauses after cardioversion in May of 2006.  Patient had significant  sinus bradycardia and syncope and Toprol was discontinued with improvement of symptoms.     Continue on Metroprolol tartrate 12.5 mg twice a day.  Observe for any bradycardia.     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  blood pressure today 128/99  Continue metoprolol.  Patient is not having any angina pectoris or congestive heart failure.     Medications were reviewed and updated.     Anticoagulation-continue Coumadin.  PT/INR on a monthly basis.  INR today 2.0.     EKG showed atrial flutter with controlled ventricular response.-6/12/2023  Options were discussed.  Continuation of anticoagulation and observation since patient is asymptomatic-patient's preference.  Consider cardioversion/ablation.  Consideration of treatment and observation unless patient develops symptoms.     Follow-up in the office in 6 months.     Further plan will depend on patient's progress.  [[[[[[[[[[[[[[[[[[[[[[[[[[[[[[[[[[                         Diagnosis Plan   1. Atrial flutter, unspecified type        2. Long term (current) use of anticoagulants        3. Tachycardia-bradycardia        4. Palpitations        5. Atrial flutter with controlled response        6. Essential hypertension        7. Bradycardia, sinus        LAB RESULTS (LAST 7 DAYS)    CBC        BMP        CMP         BNP        TROPONIN        CoAg  Results from last 7 days    Lab Units 06/12/23  1207   INR  1.60*       Creatinine Clearance  CrCl cannot be calculated (Patient's most recent lab result is older than the maximum 30 days allowed.).    ABG        Radiology  No radiology results for the last day                The following portions of the patient's history were reviewed and updated as appropriate: allergies, current medications, past family history, past medical history, past social history, past surgical history, and problem list.    Review of Systems   Constitutional: Positive for malaise/fatigue.   Cardiovascular:  Negative for chest pain, leg swelling, palpitations and syncope.   Respiratory:  Negative for shortness of breath (WITH EXERTION).    Skin:  Negative for rash.   Gastrointestinal:  Negative for nausea and vomiting.   Neurological:  Negative for dizziness, light-headedness and numbness.   All other systems reviewed and are negative.      Current Outpatient Medications:   •  Ascorbic Acid (VITAMIN C ER) 1000 MG tablet controlled-release, VITAMIN C ER 1000 MG ORAL TABLET EXTENDED RELEASE, Disp: , Rfl:   •  Ginkgo Biloba 120 MG tablet, GINKOBA TABS, Disp: , Rfl:   •  Glucosamine-Chondroitin 250-200 MG tablet, [facility]S GLUCOSAMINE-CHONDROITIN TABS, Disp: , Rfl:   •  metoprolol tartrate (LOPRESSOR) 25 MG tablet, Take 0.5 tablets by mouth Every 12 (Twelve) Hours., Disp: , Rfl:   •  MULTIPLE VITAMIN-FOLIC ACID PO, MULTIVITAMINS TABS, Disp: , Rfl:   •  tadalafil (Cialis) 20 MG tablet, Take 1 tablet by mouth Daily As Needed for Erectile Dysfunction., Disp: 30 tablet, Rfl: 1  •  tamsulosin (FLOMAX) 0.4 MG capsule 24 hr capsule, TAKE 1 CAPSULE BY MOUTH  DAILY, Disp: 90 capsule, Rfl: 1  •  warfarin (COUMADIN) 5 MG tablet, Take 0.5 tablets by mouth Daily., Disp: , Rfl:     Allergies   Allergen Reactions   • Other Rash     Horse Serum       Family History   Problem Relation Age of Onset   • Cancer Mother    • Lung cancer Mother         smoker   • Hyperlipidemia Father    •  Anuerysm Father         AAA- smoker   • Hyperlipidemia Other    • Aneurysm Other         AAA   • Hyperlipidemia Other    • Stroke Other    • Coronary artery disease Other    • Anuerysm Paternal Uncle         AAA   • Heart attack Daughter    • Heart failure Sister         Hypoplastic right coronary artery       Past Surgical History:   Procedure Laterality Date   • CATARACT EXTRACTION     • HERNIA REPAIR Right 1967, 2013    R inguinal   • KNEE SURGERY Right 1985    R meniscus repair   • MASTOIDECTOMY  1942   • MOHS SURGERY     • TONSILLECTOMY         Past Medical History:   Diagnosis Date   • Abnormal ECG 1984   • Atrial fibrillation    • Atrial flutter     S/P CARDIOVERSION   • BPH without urinary obstruction    • DDD (degenerative disc disease), lumbar    • ED (erectile dysfunction) of organic origin    • GERD (gastroesophageal reflux disease)    • H/O tachycardia-bradycardia syndrome    • Hypertension 2023   • Left ventricular hypertrophy    • Memory loss    • Osteoarthritis    • Prostate cancer 2010    radioactive seeds placed, in remission   • RBBB (right bundle branch block)    • Skin cancer     H/O SKIN CANCER   • Spinal stenosis, lumbar        Family History   Problem Relation Age of Onset   • Cancer Mother    • Lung cancer Mother         smoker   • Hyperlipidemia Father    • Anuerysm Father         AAA- smoker   • Hyperlipidemia Other    • Aneurysm Other         AAA   • Hyperlipidemia Other    • Stroke Other    • Coronary artery disease Other    • Anuerysm Paternal Uncle         AAA   • Heart attack Daughter    • Heart failure Sister         Hypoplastic right coronary artery       Social History     Socioeconomic History   • Marital status:    Tobacco Use   • Smoking status: Never     Passive exposure: Never   • Smokeless tobacco: Never   Vaping Use   • Vaping Use: Never used   Substance and Sexual Activity   • Alcohol use: Yes     Alcohol/week: 6.0 standard drinks     Types: 6 Shots of liquor per  "week   • Drug use: Never   • Sexual activity: Yes     Partners: Female     Birth control/protection: Post-menopausal, Vasectomy           ECG 12 Lead    Date/Time: 6/12/2023 12:34 PM  Performed by: Calvin Anderson MD  Authorized by: Calvin Anderson MD   Comparison: compared with previous ECG   Similar to previous ECG  Comparison to previous ECG: Atrial flutter with controlled ventricular response 79/min nonspecific ST-T wave changes normal axis normal intervals no ectopy no significant change from previous EKG.        Objective:       Physical Exam    /99   Pulse 88   Ht 165.1 cm (65\")   Wt 61.7 kg (136 lb)   BMI 22.63 kg/m²   The patient is alert, oriented and in no distress.    Vital signs as noted above.    Head and neck revealed no carotid bruits or jugular venous distension.  No thyromegaly or lymphadenopathy is present.    Lungs clear.  No wheezing.  Breath sounds are normal bilaterally.    Heart normal first and second heart sounds.  No murmur..  No pericardial rub is present.  No gallop is present.    Abdomen soft and nontender.  No organomegaly is present.    Extremities revealed good peripheral pulses without any pedal edema.    Skin warm and dry.    Musculoskeletal system is grossly normal.    CNS grossly normal.    Reviewed and updated.        "

## 2023-06-12 ENCOUNTER — OFFICE VISIT (OUTPATIENT)
Dept: CARDIOLOGY | Facility: CLINIC | Age: 88
End: 2023-06-12
Payer: MEDICARE

## 2023-06-12 ENCOUNTER — ANTICOAGULATION VISIT (OUTPATIENT)
Dept: CARDIOLOGY | Facility: CLINIC | Age: 88
End: 2023-06-12
Payer: MEDICARE

## 2023-06-12 VITALS
WEIGHT: 136 LBS | HEART RATE: 88 BPM | DIASTOLIC BLOOD PRESSURE: 99 MMHG | SYSTOLIC BLOOD PRESSURE: 128 MMHG | BODY MASS INDEX: 22.66 KG/M2 | HEIGHT: 65 IN

## 2023-06-12 VITALS
WEIGHT: 136 LBS | BODY MASS INDEX: 22.63 KG/M2 | SYSTOLIC BLOOD PRESSURE: 128 MMHG | HEART RATE: 88 BPM | DIASTOLIC BLOOD PRESSURE: 99 MMHG

## 2023-06-12 DIAGNOSIS — Z79.01 LONG TERM (CURRENT) USE OF ANTICOAGULANTS: ICD-10-CM

## 2023-06-12 DIAGNOSIS — I49.5 TACHYCARDIA-BRADYCARDIA: ICD-10-CM

## 2023-06-12 DIAGNOSIS — I48.92 ATRIAL FLUTTER, UNSPECIFIED TYPE: Primary | ICD-10-CM

## 2023-06-12 DIAGNOSIS — R00.1 BRADYCARDIA, SINUS: ICD-10-CM

## 2023-06-12 DIAGNOSIS — I48.92 ATRIAL FLUTTER WITH CONTROLLED RESPONSE: ICD-10-CM

## 2023-06-12 DIAGNOSIS — I10 ESSENTIAL HYPERTENSION: ICD-10-CM

## 2023-06-12 DIAGNOSIS — R00.2 PALPITATIONS: ICD-10-CM

## 2023-06-12 LAB — INR PPP: 1.6 (ref 2–3)

## 2023-06-12 PROCEDURE — 36416 COLLJ CAPILLARY BLOOD SPEC: CPT | Performed by: INTERNAL MEDICINE

## 2023-06-12 PROCEDURE — 85610 PROTHROMBIN TIME: CPT | Performed by: INTERNAL MEDICINE

## 2023-07-24 ENCOUNTER — ANTICOAGULATION VISIT (OUTPATIENT)
Dept: CARDIOLOGY | Facility: CLINIC | Age: 88
End: 2023-07-24
Payer: MEDICARE

## 2023-07-24 VITALS
HEART RATE: 88 BPM | BODY MASS INDEX: 22.8 KG/M2 | DIASTOLIC BLOOD PRESSURE: 87 MMHG | SYSTOLIC BLOOD PRESSURE: 114 MMHG | WEIGHT: 137 LBS

## 2023-07-24 DIAGNOSIS — I48.92 ATRIAL FLUTTER, UNSPECIFIED TYPE: Primary | ICD-10-CM

## 2023-07-24 DIAGNOSIS — Z79.01 LONG TERM (CURRENT) USE OF ANTICOAGULANTS: ICD-10-CM

## 2023-07-24 LAB — INR PPP: 2.2 (ref 0.9–1.1)

## 2023-07-24 PROCEDURE — 85610 PROTHROMBIN TIME: CPT | Performed by: INTERNAL MEDICINE

## 2023-07-24 PROCEDURE — 36416 COLLJ CAPILLARY BLOOD SPEC: CPT | Performed by: INTERNAL MEDICINE

## 2023-09-05 ENCOUNTER — ANTICOAGULATION VISIT (OUTPATIENT)
Dept: CARDIOLOGY | Facility: CLINIC | Age: 88
End: 2023-09-05
Payer: MEDICARE

## 2023-09-05 VITALS
WEIGHT: 135.6 LBS | HEART RATE: 95 BPM | SYSTOLIC BLOOD PRESSURE: 134 MMHG | DIASTOLIC BLOOD PRESSURE: 93 MMHG | BODY MASS INDEX: 22.57 KG/M2

## 2023-09-05 DIAGNOSIS — I48.92 ATRIAL FLUTTER, UNSPECIFIED TYPE: Primary | ICD-10-CM

## 2023-09-05 DIAGNOSIS — Z79.01 LONG TERM (CURRENT) USE OF ANTICOAGULANTS: ICD-10-CM

## 2023-09-05 LAB — INR PPP: 2.1 (ref 2–3)

## 2023-09-05 PROCEDURE — 85610 PROTHROMBIN TIME: CPT | Performed by: INTERNAL MEDICINE

## 2023-09-05 PROCEDURE — 36416 COLLJ CAPILLARY BLOOD SPEC: CPT | Performed by: INTERNAL MEDICINE

## 2023-10-16 ENCOUNTER — ANTICOAGULATION VISIT (OUTPATIENT)
Dept: CARDIOLOGY | Facility: CLINIC | Age: 88
End: 2023-10-16
Payer: MEDICARE

## 2023-10-16 VITALS — OXYGEN SATURATION: 95 % | DIASTOLIC BLOOD PRESSURE: 88 MMHG | HEART RATE: 88 BPM | SYSTOLIC BLOOD PRESSURE: 117 MMHG

## 2023-10-16 DIAGNOSIS — I48.92 ATRIAL FLUTTER, UNSPECIFIED TYPE: Primary | ICD-10-CM

## 2023-10-16 DIAGNOSIS — Z79.01 LONG TERM (CURRENT) USE OF ANTICOAGULANTS: ICD-10-CM

## 2023-10-16 LAB — INR PPP: 2 (ref 2–3)

## 2023-10-16 PROCEDURE — 85610 PROTHROMBIN TIME: CPT | Performed by: INTERNAL MEDICINE

## 2023-10-16 PROCEDURE — 36416 COLLJ CAPILLARY BLOOD SPEC: CPT | Performed by: INTERNAL MEDICINE

## 2023-11-03 DIAGNOSIS — N40.0 BPH WITHOUT URINARY OBSTRUCTION: ICD-10-CM

## 2023-11-03 RX ORDER — TAMSULOSIN HYDROCHLORIDE 0.4 MG/1
1 CAPSULE ORAL DAILY
Qty: 90 CAPSULE | Refills: 3 | Status: SHIPPED | OUTPATIENT
Start: 2023-11-03

## 2023-11-20 ENCOUNTER — ANTICOAGULATION VISIT (OUTPATIENT)
Dept: CARDIOLOGY | Facility: CLINIC | Age: 88
End: 2023-11-20
Payer: MEDICARE

## 2023-11-20 ENCOUNTER — TELEPHONE (OUTPATIENT)
Dept: CARDIOLOGY | Facility: CLINIC | Age: 88
End: 2023-11-20

## 2023-11-20 VITALS — SYSTOLIC BLOOD PRESSURE: 141 MMHG | HEART RATE: 96 BPM | DIASTOLIC BLOOD PRESSURE: 104 MMHG

## 2023-11-20 DIAGNOSIS — I10 ESSENTIAL HYPERTENSION: Primary | ICD-10-CM

## 2023-11-20 DIAGNOSIS — I48.92 ATRIAL FLUTTER, UNSPECIFIED TYPE: Primary | ICD-10-CM

## 2023-11-20 DIAGNOSIS — I49.5 TACHYCARDIA-BRADYCARDIA: ICD-10-CM

## 2023-11-20 DIAGNOSIS — Z79.01 LONG TERM (CURRENT) USE OF ANTICOAGULANTS: ICD-10-CM

## 2023-11-20 LAB — INR PPP: 2.4 (ref 2–3)

## 2023-11-20 PROCEDURE — 36416 COLLJ CAPILLARY BLOOD SPEC: CPT | Performed by: INTERNAL MEDICINE

## 2023-11-20 PROCEDURE — 85610 PROTHROMBIN TIME: CPT | Performed by: INTERNAL MEDICINE

## 2023-11-20 NOTE — TELEPHONE ENCOUNTER
Patient calling for Belle. He was in this morning for INR, and was advised to go home and re-check BP.     Before lunch 137/101 129/102  After lunch 130/96 128/100

## 2023-11-20 NOTE — TELEPHONE ENCOUNTER
Pt in for protime today blood pressure checked 141/104. Pt states he has had low blood pressure episodes recently. Advised pt to recheck blood pressure at home given it has been running low not elevated. Please see below for home blood pressure readings today.       Please advise

## 2023-11-21 DIAGNOSIS — I49.5 TACHYCARDIA-BRADYCARDIA: ICD-10-CM

## 2023-11-21 DIAGNOSIS — I10 ESSENTIAL HYPERTENSION: ICD-10-CM

## 2023-11-21 RX ORDER — AMLODIPINE BESYLATE 5 MG/1
TABLET ORAL
Qty: 90 TABLET | Refills: 1 | Status: SHIPPED | OUTPATIENT
Start: 2023-11-21

## 2023-11-21 RX ORDER — AMLODIPINE BESYLATE 5 MG/1
TABLET ORAL
Qty: 30 TABLET | Refills: 1 | Status: SHIPPED | OUTPATIENT
Start: 2023-11-21 | End: 2023-11-21

## 2023-11-21 NOTE — TELEPHONE ENCOUNTER
Pt returned call RE: starting Norvasc for high BP. He is going to check his BP at home for a few days before starting med. He did want me to call in RX so he could  over the weekend if he decides to take.Naun

## 2023-11-21 NOTE — TELEPHONE ENCOUNTER
Rx Refill Note  Requested Prescriptions     Signed Prescriptions Disp Refills    amLODIPine (NORVASC) 5 MG tablet 90 tablet 1     Sig: TAKE 1 TABLET BY MOUTH DAILY     Authorizing Provider: MILEY POLK     Ordering User: RACHAEL COLE      Last office visit with prescribing clinician: 6/12/2023   Last telemedicine visit with prescribing clinician: Visit date not found   Next office visit with prescribing clinician: 1/2/2024                         Would you like a call back once the refill request has been completed: [] Yes [] No    If the office needs to give you a call back, can they leave a voicemail: [] Yes [] No    Rachael Cole MA  11/21/23, 11:09 EST

## 2023-12-08 NOTE — PROGRESS NOTES
Encounter Date:01/02/2024    Last seen-6/12/2023    Patient ID: Henok Beasley is a 88 y.o. male.    Chief Complaint:  Tachycardia bradycardia syndrome  History of atrial flutter.  History of sinus bradycardia  Elevated blood pressure.  Incomplete right bundle branch block        History of Present Illness     Since I have last seen, the patient has been without any chest discomfort ,shortness of breath, palpitations, dizziness or syncope.  Denies having any headache ,abdominal pain ,nausea, vomiting , diarrhea constipation, loss of weight or loss of appetite.  Denies having any excessive bruising ,hematuria or blood in the stool.    Review of all systems negative except as indicated.    Reviewed ROS.    Assessment and Plan         [[[[[[[[[[[[[[[[[[[[[[[[  History  ====================  - Tachy-lolis syndrome.  History of atrial flutter.  Patient was maintaining sinus rhythm  Patient is in atrial flutter 1/31/2023            Atrial flutter 3/9/2023  Atrial flutter-6/12/2023     Status post electrical cardioversion and patient had significant sinus pauses after cardioversion in May of 2006. Patient had significant  sinus bradycardia and syncope and Toprol was discontinued with improvement of symptoms.      Echocardiogram 3/9/2023 revealed   Biatrial enlargement.  Concentric left ventricle hypertrophy.  Right ventricle enlargement.  Structurally and functionally normal cardiac valves.  Left ventricular size and contractility is normal with ejection fraction of 60%.      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  Minimal mitral and aortic regurgitation     - Chronic and incomplete right bundle-branch block       -Elevated blood pressure       -History of allergy to horse serum      -Status post hernia repair and right knee surgery.     ====================  Plan  =================  Tachy-lolis syndrome.  History of atrial flutter.  Patient was maintaining sinus rhythm  Patient was in atrial  flutter 1/31/2023            Atrial flutter-EKG- 2/15/2023  EKG 3/9/2023-atrial flutter with controlled ventricular response  Atrial flutter with controlled ventricular response-EKG-6/12/2023  EKG 1/2/2024-atrial flutter with controlled ventricular response.     Status post electrical cardioversion and patient had significant sinus pauses after cardioversion in May of 2006.  Patient had significant  sinus bradycardia and syncope and Toprol was discontinued with improvement of symptoms.     Continue on Metroprolol tartrate 12.5 mg once a day.  Observe for any bradycardia.     Chronic and incomplete right bundle branch block-stable and asymptomatic    Hypertension-well-controlled-124/86.    Continue metoprolol.  Patient is not having any angina pectoris or congestive heart failure.     Medications were reviewed and updated.     Anticoagulation-continue Coumadin.  PT/INR on a monthly basis.  INR today 2.1  Observe for toxic effects.     EKG showed atrial flutter with controlled ventricular response.-6/12/2023  Options were discussed.  Continuation of anticoagulation and observation since patient is asymptomatic-patient's preference.  Consider cardioversion/ablation.  Consideration of treatment and observation unless patient develops symptoms.     Follow-up in the office in 6 months.     Further plan will depend on patient's progress.    Reviewed and updated-1/2/2024  [[[[[[[[[[[[[[[[[[[[[[[[[[[[[[[[[[          Diagnosis Plan   1. Essential hypertension        2. Tachycardia-bradycardia        3. Atrial flutter, unspecified type        4. Long term (current) use of anticoagulants        5. Bradycardia, sinus        6. Atrial flutter with controlled response        7. Palpitations        LAB RESULTS (LAST 7 DAYS)    CBC        BMP        CMP         BNP        TROPONIN        CoAg        Creatinine Clearance  CrCl cannot be calculated (Patient's most recent lab result is older than the maximum 30 days allowed.).    ABG         Radiology  No radiology results for the last day                The following portions of the patient's history were reviewed and updated as appropriate: allergies, current medications, past family history, past medical history, past social history, past surgical history, and problem list.    ROS      Current Outpatient Medications:     amLODIPine (NORVASC) 5 MG tablet, TAKE 1 TABLET BY MOUTH DAILY, Disp: 90 tablet, Rfl: 1    Ascorbic Acid (VITAMIN C ER) 1000 MG tablet controlled-release, VITAMIN C ER 1000 MG ORAL TABLET EXTENDED RELEASE, Disp: , Rfl:     Ginkgo Biloba 120 MG tablet, GINKOBA TABS, Disp: , Rfl:     Glucosamine-Chondroitin 250-200 MG tablet, [facility]S GLUCOSAMINE-CHONDROITIN TABS, Disp: , Rfl:     metoprolol tartrate (LOPRESSOR) 25 MG tablet, Take 0.5 tablets by mouth Every 12 (Twelve) Hours., Disp: , Rfl:     MULTIPLE VITAMIN-FOLIC ACID PO, MULTIVITAMINS TABS, Disp: , Rfl:     tadalafil (Cialis) 20 MG tablet, Take 1 tablet by mouth Daily As Needed for Erectile Dysfunction., Disp: 30 tablet, Rfl: 1    tamsulosin (FLOMAX) 0.4 MG capsule 24 hr capsule, TAKE 1 CAPSULE BY MOUTH DAILY, Disp: 90 capsule, Rfl: 3    warfarin (COUMADIN) 5 MG tablet, Take 0.5 tablets by mouth Daily., Disp: , Rfl:     Allergies   Allergen Reactions    Other Rash     Horse Serum       Family History   Problem Relation Age of Onset    Cancer Mother     Lung cancer Mother         smoker    Hyperlipidemia Father     Anuerysm Father         AAA- smoker    Hyperlipidemia Other     Aneurysm Other         AAA    Hyperlipidemia Other     Stroke Other     Coronary artery disease Other     Anuerysm Paternal Uncle         AAA    Heart attack Daughter     Heart failure Sister         Hypoplastic right coronary artery       Past Surgical History:   Procedure Laterality Date    CATARACT EXTRACTION      HERNIA REPAIR Right 1967, 2013    R inguinal    KNEE SURGERY Right 1985    R meniscus repair    MASTOIDECTOMY  1942    MOHS SURGERY       TONSILLECTOMY         Past Medical History:   Diagnosis Date    Abnormal ECG 1984    Atrial fibrillation     Atrial flutter     S/P CARDIOVERSION    BPH without urinary obstruction     DDD (degenerative disc disease), lumbar     ED (erectile dysfunction) of organic origin     GERD (gastroesophageal reflux disease)     H/O tachycardia-bradycardia syndrome     Hypertension 2023    Left ventricular hypertrophy     Memory loss     Osteoarthritis     Prostate cancer 2010    radioactive seeds placed, in remission    RBBB (right bundle branch block)     Skin cancer     H/O SKIN CANCER    Spinal stenosis, lumbar        Family History   Problem Relation Age of Onset    Cancer Mother     Lung cancer Mother         smoker    Hyperlipidemia Father     Anuerysm Father         AAA- smoker    Hyperlipidemia Other     Aneurysm Other         AAA    Hyperlipidemia Other     Stroke Other     Coronary artery disease Other     Anuerysm Paternal Uncle         AAA    Heart attack Daughter     Heart failure Sister         Hypoplastic right coronary artery       Social History     Socioeconomic History    Marital status:    Tobacco Use    Smoking status: Never     Passive exposure: Never    Smokeless tobacco: Never   Vaping Use    Vaping Use: Never used   Substance and Sexual Activity    Alcohol use: Yes     Alcohol/week: 6.0 standard drinks of alcohol     Types: 6 Shots of liquor per week    Drug use: Never    Sexual activity: Yes     Partners: Female     Birth control/protection: Post-menopausal, Vasectomy           ECG 12 Lead    Date/Time: 1/2/2024 10:23 AM  Performed by: Calvin Anderson MD    Authorized by: Calvin Anderson MD  Comparison: compared with previous ECG   Similar to previous ECG  Comparison to previous ECG: Atrial flutter with controlled ventricular response 87/min nonspecific ST-T wave changes normal axis normal intervals no ectopy no significant change from previous EKG.        Objective:       Physical  Exam    There were no vitals taken for this visit.  The patient is alert, oriented and in no distress.    Vital signs as noted above.    Head and neck revealed no carotid bruits or jugular venous distension.  No thyromegaly or lymphadenopathy is present.    Lungs clear.  No wheezing.  Breath sounds are normal bilaterally.    Heart normal first and second heart sounds.  No murmur..  No pericardial rub is present.  No gallop is present.    Abdomen soft and nontender.  No organomegaly is present.    Extremities revealed good peripheral pulses without any pedal edema.    Skin warm and dry.    Musculoskeletal system is grossly normal.    CNS grossly normal.    Reviewed and updated.

## 2023-12-15 ENCOUNTER — ANTICOAGULATION VISIT (OUTPATIENT)
Dept: CARDIOLOGY | Facility: CLINIC | Age: 88
End: 2023-12-15
Payer: MEDICARE

## 2023-12-15 VITALS
SYSTOLIC BLOOD PRESSURE: 111 MMHG | HEART RATE: 75 BPM | DIASTOLIC BLOOD PRESSURE: 86 MMHG | BODY MASS INDEX: 23.13 KG/M2 | WEIGHT: 139 LBS

## 2023-12-15 DIAGNOSIS — Z79.01 LONG TERM (CURRENT) USE OF ANTICOAGULANTS: ICD-10-CM

## 2023-12-15 DIAGNOSIS — I48.92 ATRIAL FLUTTER, UNSPECIFIED TYPE: Primary | ICD-10-CM

## 2023-12-15 LAB — INR PPP: 2.1 (ref 0.9–1.1)

## 2023-12-15 PROCEDURE — 85610 PROTHROMBIN TIME: CPT | Performed by: INTERNAL MEDICINE

## 2023-12-15 PROCEDURE — 36416 COLLJ CAPILLARY BLOOD SPEC: CPT | Performed by: INTERNAL MEDICINE

## 2024-01-02 ENCOUNTER — ANTICOAGULATION VISIT (OUTPATIENT)
Dept: CARDIOLOGY | Facility: CLINIC | Age: 89
End: 2024-01-02
Payer: MEDICARE

## 2024-01-02 ENCOUNTER — OFFICE VISIT (OUTPATIENT)
Dept: CARDIOLOGY | Facility: CLINIC | Age: 89
End: 2024-01-02
Payer: MEDICARE

## 2024-01-02 VITALS
HEART RATE: 93 BPM | DIASTOLIC BLOOD PRESSURE: 86 MMHG | SYSTOLIC BLOOD PRESSURE: 124 MMHG | WEIGHT: 140 LBS | BODY MASS INDEX: 23.3 KG/M2

## 2024-01-02 VITALS
WEIGHT: 140 LBS | HEIGHT: 65 IN | DIASTOLIC BLOOD PRESSURE: 86 MMHG | BODY MASS INDEX: 23.32 KG/M2 | HEART RATE: 93 BPM | SYSTOLIC BLOOD PRESSURE: 124 MMHG

## 2024-01-02 DIAGNOSIS — R00.2 PALPITATIONS: ICD-10-CM

## 2024-01-02 DIAGNOSIS — I10 ESSENTIAL HYPERTENSION: Primary | ICD-10-CM

## 2024-01-02 DIAGNOSIS — Z79.01 LONG TERM (CURRENT) USE OF ANTICOAGULANTS: ICD-10-CM

## 2024-01-02 DIAGNOSIS — I48.92 ATRIAL FLUTTER WITH CONTROLLED RESPONSE: ICD-10-CM

## 2024-01-02 DIAGNOSIS — R00.1 BRADYCARDIA, SINUS: ICD-10-CM

## 2024-01-02 DIAGNOSIS — I49.5 TACHYCARDIA-BRADYCARDIA: ICD-10-CM

## 2024-01-02 DIAGNOSIS — I48.92 ATRIAL FLUTTER, UNSPECIFIED TYPE: Primary | ICD-10-CM

## 2024-01-02 DIAGNOSIS — I48.92 ATRIAL FLUTTER, UNSPECIFIED TYPE: ICD-10-CM

## 2024-01-02 LAB — INR PPP: 2.1 (ref 0.9–1.1)

## 2024-01-02 PROCEDURE — 85610 PROTHROMBIN TIME: CPT | Performed by: INTERNAL MEDICINE

## 2024-01-02 PROCEDURE — 36416 COLLJ CAPILLARY BLOOD SPEC: CPT | Performed by: INTERNAL MEDICINE

## 2024-02-14 ENCOUNTER — ANTICOAGULATION VISIT (OUTPATIENT)
Dept: CARDIOLOGY | Facility: CLINIC | Age: 89
End: 2024-02-14
Payer: MEDICARE

## 2024-02-14 VITALS
SYSTOLIC BLOOD PRESSURE: 121 MMHG | BODY MASS INDEX: 22.53 KG/M2 | DIASTOLIC BLOOD PRESSURE: 89 MMHG | WEIGHT: 135.4 LBS | HEART RATE: 94 BPM

## 2024-02-14 DIAGNOSIS — Z79.01 LONG TERM (CURRENT) USE OF ANTICOAGULANTS: ICD-10-CM

## 2024-02-14 DIAGNOSIS — I48.92 ATRIAL FLUTTER, UNSPECIFIED TYPE: Primary | ICD-10-CM

## 2024-02-14 LAB — INR PPP: 2.1 (ref 2–3)

## 2024-02-14 PROCEDURE — 36416 COLLJ CAPILLARY BLOOD SPEC: CPT | Performed by: INTERNAL MEDICINE

## 2024-02-14 PROCEDURE — 85610 PROTHROMBIN TIME: CPT | Performed by: INTERNAL MEDICINE

## 2024-02-22 NOTE — TELEPHONE ENCOUNTER
Rx Refill Note  Requested Prescriptions     Pending Prescriptions Disp Refills    metoprolol tartrate (LOPRESSOR) 25 MG tablet [Pharmacy Med Name: METOPROLOL TARTRATE 25MG TABLETS] 180 tablet      Sig: TAKE 1/2 TABLET BY MOUTH TWICE DAILY      Last office visit with prescribing clinician: 1/2/2024   Last telemedicine visit with prescribing clinician: Visit date not found   Next office visit with prescribing clinician: 7/9/2024                         Would you like a call back once the refill request has been completed: [] Yes [] No    If the office needs to give you a call back, can they leave a voicemail: [] Yes [] No    Beronica Sarkar MA  02/22/24, 09:32 EST

## 2024-03-26 ENCOUNTER — ANTICOAGULATION VISIT (OUTPATIENT)
Dept: CARDIOLOGY | Facility: CLINIC | Age: 89
End: 2024-03-26
Payer: MEDICARE

## 2024-03-26 VITALS
BODY MASS INDEX: 22.8 KG/M2 | WEIGHT: 137 LBS | DIASTOLIC BLOOD PRESSURE: 91 MMHG | SYSTOLIC BLOOD PRESSURE: 121 MMHG | HEART RATE: 92 BPM

## 2024-03-26 DIAGNOSIS — I48.92 ATRIAL FLUTTER, UNSPECIFIED TYPE: Primary | ICD-10-CM

## 2024-03-26 DIAGNOSIS — Z79.01 LONG TERM (CURRENT) USE OF ANTICOAGULANTS: ICD-10-CM

## 2024-03-26 LAB — INR PPP: 1.7 (ref 2–3)

## 2024-03-26 PROCEDURE — 36416 COLLJ CAPILLARY BLOOD SPEC: CPT | Performed by: INTERNAL MEDICINE

## 2024-03-26 PROCEDURE — 85610 PROTHROMBIN TIME: CPT | Performed by: INTERNAL MEDICINE

## 2024-04-22 ENCOUNTER — TELEPHONE (OUTPATIENT)
Dept: FAMILY MEDICINE CLINIC | Facility: CLINIC | Age: 89
End: 2024-04-22

## 2024-04-22 NOTE — TELEPHONE ENCOUNTER
Caller: Henok Beasley    Relationship: Self    Best call back number: [phone]    What orders are you requesting (i.e. lab or imaging): LAB ORDERS    Additional notes: PT WANTS TO KNOW IF HE SHOULD GET LABS DONE PRIOR TO 4/25/24 [facility]. STATED PCP ORDERED THEM PRIOR TO [facility] LAST YEAR. PT REQUESTING CALL BACK TO [facility].

## 2024-04-23 ENCOUNTER — LAB (OUTPATIENT)
Dept: FAMILY MEDICINE CLINIC | Facility: CLINIC | Age: 89
End: 2024-04-23
Payer: MEDICARE

## 2024-04-23 DIAGNOSIS — I10 HYPERTENSION, UNSPECIFIED TYPE: ICD-10-CM

## 2024-04-23 DIAGNOSIS — E53.8 VITAMIN B12 DEFICIENCY: ICD-10-CM

## 2024-04-23 DIAGNOSIS — R73.09 ELEVATED GLUCOSE: ICD-10-CM

## 2024-04-23 DIAGNOSIS — E55.9 VITAMIN D DEFICIENCY: ICD-10-CM

## 2024-04-23 DIAGNOSIS — I10 HYPERTENSION, UNSPECIFIED TYPE: Primary | ICD-10-CM

## 2024-04-23 DIAGNOSIS — Z12.5 SCREENING FOR PROSTATE CANCER: ICD-10-CM

## 2024-04-23 DIAGNOSIS — Z13.220 SCREENING FOR HYPERLIPIDEMIA: ICD-10-CM

## 2024-04-23 LAB
25(OH)D3 SERPL-MCNC: 28.4 NG/ML (ref 30–100)
ALBUMIN SERPL-MCNC: 4 G/DL (ref 3.5–5.2)
ALBUMIN/GLOB SERPL: 1.4 G/DL
ALP SERPL-CCNC: 92 U/L (ref 39–117)
ALT SERPL W P-5'-P-CCNC: 25 U/L (ref 1–41)
ANION GAP SERPL CALCULATED.3IONS-SCNC: 9.7 MMOL/L (ref 5–15)
AST SERPL-CCNC: 28 U/L (ref 1–40)
BASOPHILS # BLD AUTO: 0.06 10*3/MM3 (ref 0–0.2)
BASOPHILS NFR BLD AUTO: 1.2 % (ref 0–1.5)
BILIRUB SERPL-MCNC: 0.9 MG/DL (ref 0–1.2)
BUN SERPL-MCNC: 27 MG/DL (ref 8–23)
BUN/CREAT SERPL: 22.1 (ref 7–25)
CALCIUM SPEC-SCNC: 9.4 MG/DL (ref 8.6–10.5)
CHLORIDE SERPL-SCNC: 101 MMOL/L (ref 98–107)
CHOLEST SERPL-MCNC: 148 MG/DL (ref 0–200)
CO2 SERPL-SCNC: 27.3 MMOL/L (ref 22–29)
CREAT SERPL-MCNC: 1.22 MG/DL (ref 0.76–1.27)
DEPRECATED RDW RBC AUTO: 44.5 FL (ref 37–54)
EGFRCR SERPLBLD CKD-EPI 2021: 57.4 ML/MIN/1.73
EOSINOPHIL # BLD AUTO: 0.15 10*3/MM3 (ref 0–0.4)
EOSINOPHIL NFR BLD AUTO: 2.9 % (ref 0.3–6.2)
ERYTHROCYTE [DISTWIDTH] IN BLOOD BY AUTOMATED COUNT: 12.6 % (ref 12.3–15.4)
GLOBULIN UR ELPH-MCNC: 2.8 GM/DL
GLUCOSE SERPL-MCNC: 88 MG/DL (ref 65–99)
HBA1C MFR BLD: 5.8 % (ref 4.8–5.6)
HCT VFR BLD AUTO: 49.6 % (ref 37.5–51)
HDLC SERPL-MCNC: 48 MG/DL (ref 40–60)
HGB BLD-MCNC: 16.9 G/DL (ref 13–17.7)
IMM GRANULOCYTES # BLD AUTO: 0 10*3/MM3 (ref 0–0.05)
IMM GRANULOCYTES NFR BLD AUTO: 0 % (ref 0–0.5)
LDLC SERPL CALC-MCNC: 82 MG/DL (ref 0–100)
LDLC/HDLC SERPL: 1.67 {RATIO}
LYMPHOCYTES # BLD AUTO: 1.28 10*3/MM3 (ref 0.7–3.1)
LYMPHOCYTES NFR BLD AUTO: 24.7 % (ref 19.6–45.3)
MCH RBC QN AUTO: 32.5 PG (ref 26.6–33)
MCHC RBC AUTO-ENTMCNC: 34.1 G/DL (ref 31.5–35.7)
MCV RBC AUTO: 95.4 FL (ref 79–97)
MONOCYTES # BLD AUTO: 0.62 10*3/MM3 (ref 0.1–0.9)
MONOCYTES NFR BLD AUTO: 12 % (ref 5–12)
NEUTROPHILS NFR BLD AUTO: 3.07 10*3/MM3 (ref 1.7–7)
NEUTROPHILS NFR BLD AUTO: 59.2 % (ref 42.7–76)
NRBC BLD AUTO-RTO: 0 /100 WBC (ref 0–0.2)
PLATELET # BLD AUTO: 214 10*3/MM3 (ref 140–450)
PMV BLD AUTO: 9.6 FL (ref 6–12)
POTASSIUM SERPL-SCNC: 4.7 MMOL/L (ref 3.5–5.2)
PROT SERPL-MCNC: 6.8 G/DL (ref 6–8.5)
PSA SERPL-MCNC: <0.014 NG/ML (ref 0–4)
RBC # BLD AUTO: 5.2 10*6/MM3 (ref 4.14–5.8)
SODIUM SERPL-SCNC: 138 MMOL/L (ref 136–145)
TRIGL SERPL-MCNC: 99 MG/DL (ref 0–150)
TSH SERPL DL<=0.05 MIU/L-ACNC: 2.6 UIU/ML (ref 0.27–4.2)
VIT B12 BLD-MCNC: 774 PG/ML (ref 211–946)
VLDLC SERPL-MCNC: 18 MG/DL (ref 5–40)
WBC NRBC COR # BLD AUTO: 5.18 10*3/MM3 (ref 3.4–10.8)

## 2024-04-23 PROCEDURE — 85025 COMPLETE CBC W/AUTO DIFF WBC: CPT | Performed by: FAMILY MEDICINE

## 2024-04-23 PROCEDURE — 83036 HEMOGLOBIN GLYCOSYLATED A1C: CPT | Performed by: FAMILY MEDICINE

## 2024-04-23 PROCEDURE — 80053 COMPREHEN METABOLIC PANEL: CPT | Performed by: FAMILY MEDICINE

## 2024-04-23 PROCEDURE — G0103 PSA SCREENING: HCPCS | Performed by: FAMILY MEDICINE

## 2024-04-23 PROCEDURE — 84443 ASSAY THYROID STIM HORMONE: CPT | Performed by: FAMILY MEDICINE

## 2024-04-23 PROCEDURE — 82607 VITAMIN B-12: CPT | Performed by: FAMILY MEDICINE

## 2024-04-23 PROCEDURE — 36415 COLL VENOUS BLD VENIPUNCTURE: CPT

## 2024-04-23 PROCEDURE — 80061 LIPID PANEL: CPT | Performed by: FAMILY MEDICINE

## 2024-04-23 PROCEDURE — 82306 VITAMIN D 25 HYDROXY: CPT | Performed by: FAMILY MEDICINE

## 2024-04-25 ENCOUNTER — OFFICE VISIT (OUTPATIENT)
Dept: FAMILY MEDICINE CLINIC | Facility: CLINIC | Age: 89
End: 2024-04-25
Payer: MEDICARE

## 2024-04-25 VITALS
DIASTOLIC BLOOD PRESSURE: 76 MMHG | SYSTOLIC BLOOD PRESSURE: 114 MMHG | OXYGEN SATURATION: 96 % | HEART RATE: 89 BPM | WEIGHT: 138.2 LBS | HEIGHT: 65 IN | RESPIRATION RATE: 18 BRPM | BODY MASS INDEX: 23.03 KG/M2

## 2024-04-25 DIAGNOSIS — N52.9 ED (ERECTILE DYSFUNCTION) OF ORGANIC ORIGIN: ICD-10-CM

## 2024-04-25 DIAGNOSIS — C61 PROSTATE CANCER: ICD-10-CM

## 2024-04-25 DIAGNOSIS — M51.36 DDD (DEGENERATIVE DISC DISEASE), LUMBAR: ICD-10-CM

## 2024-04-25 DIAGNOSIS — I10 HYPERTENSION, UNSPECIFIED TYPE: ICD-10-CM

## 2024-04-25 DIAGNOSIS — N40.0 ENLARGED PROSTATE WITHOUT LOWER URINARY TRACT SYMPTOMS (LUTS): ICD-10-CM

## 2024-04-25 DIAGNOSIS — K21.9 GASTROESOPHAGEAL REFLUX DISEASE, UNSPECIFIED WHETHER ESOPHAGITIS PRESENT: ICD-10-CM

## 2024-04-25 DIAGNOSIS — I48.92 ATRIAL FLUTTER, UNSPECIFIED TYPE: ICD-10-CM

## 2024-04-25 DIAGNOSIS — Z00.00 MEDICARE ANNUAL WELLNESS VISIT, SUBSEQUENT: Primary | ICD-10-CM

## 2024-04-25 PROCEDURE — 1159F MED LIST DOCD IN RCRD: CPT | Performed by: FAMILY MEDICINE

## 2024-04-25 PROCEDURE — 99214 OFFICE O/P EST MOD 30 MIN: CPT | Performed by: FAMILY MEDICINE

## 2024-04-25 PROCEDURE — 1160F RVW MEDS BY RX/DR IN RCRD: CPT | Performed by: FAMILY MEDICINE

## 2024-04-25 PROCEDURE — G0439 PPPS, SUBSEQ VISIT: HCPCS | Performed by: FAMILY MEDICINE

## 2024-04-25 RX ORDER — PANTOPRAZOLE SODIUM 40 MG/1
40 TABLET, DELAYED RELEASE ORAL DAILY
Qty: 90 TABLET | Refills: 1 | Status: SHIPPED | OUTPATIENT
Start: 2024-04-25

## 2024-04-25 NOTE — PROGRESS NOTES
The ABCs of the Annual Wellness Visit  Subsequent Medicare Wellness Visit    Subjective    Henok Beasley is a 88 y.o. male who presents for a Subsequent Medicare Wellness Visit.    The following portions of the patient's history were reviewed and   updated as appropriate: allergies, current medications, past family history, past medical history, past social history, past surgical history, and problem list.    Compared to one year ago, the patient feels his physical   health is the same.    Compared to one year ago, the patient feels his mental   health is the same.    Recent Hospitalizations:  He was not admitted to the hospital during the last year.     Current Medical Providers:  Patient Care Team:  Ole Romo MD as PCP - General (Internal Medicine)  Calvin Anderson MD as Consulting Physician (Cardiology)    Outpatient Medications Prior to Visit   Medication Sig Dispense Refill    amLODIPine (NORVASC) 5 MG tablet TAKE 1 TABLET BY MOUTH DAILY 90 tablet 1    Ascorbic Acid (VITAMIN C ER) 1000 MG tablet controlled-release VITAMIN C ER 1000 MG ORAL TABLET EXTENDED RELEASE      Ginkgo Biloba 120 MG tablet GINKOBA TABS      Glucosamine-Chondroitin 250-200 MG tablet [facility]S GLUCOSAMINE-CHONDROITIN TABS      metoprolol tartrate (LOPRESSOR) 25 MG tablet TAKE 1/2 TABLET BY MOUTH TWICE DAILY 90 tablet 3    MULTIPLE VITAMIN-FOLIC ACID PO MULTIVITAMINS TABS      tadalafil (Cialis) 20 MG tablet Take 1 tablet by mouth Daily As Needed for Erectile Dysfunction. 30 tablet 1    tamsulosin (FLOMAX) 0.4 MG capsule 24 hr capsule TAKE 1 CAPSULE BY MOUTH DAILY 90 capsule 3    warfarin (COUMADIN) 5 MG tablet Take 0.5 tablets by mouth Daily.       No facility-administered medications prior to visit.     No opioid medication identified on active medication list. I have reviewed chart for other potential  high risk medication/s and harmful drug interactions in the elderly.      Aspirin is not on active medication list.  Aspirin use is  "not indicated based on review of current medical condition/s. Risk of harm outweighs potential benefits.  .    Patient Active Problem List   Diagnosis    Atrial flutter    Degeneration of intervertebral disc of lumbar region    Enlarged prostate without lower urinary tract symptoms (luts)    GERD (gastroesophageal reflux disease)    History of hematuria    History of malignant neoplasm of prostate    History of malignant neoplasm of skin    Hypertension    Impotence of organic origin    Left ventricular hypertrophy    Memory loss    Osteoarthritis    RBBB    Sick sinus syndrome    Spinal stenosis, lumbar    Syncope    Prostate cancer    ED (erectile dysfunction) of organic origin    DDD (degenerative disc disease), lumbar    BPH without urinary obstruction    Bilateral pseudophakia    Vitreous opacities    Long term (current) use of anticoagulants     Advance Care Planning   Advance Care Planning     Advance Directive is on file.  ACP discussion was held with the patient during this visit. Patient has an advance directive in EMR which is still valid.      Objective    Vitals:    04/25/24 1017   BP: 114/76   Pulse: 89   Resp: 18   SpO2: 96%   Weight: 62.7 kg (138 lb 3.2 oz)   Height: 165.1 cm (65\")     Estimated body mass index is 23 kg/m² as calculated from the following:    Height as of this encounter: 165.1 cm (65\").    Weight as of this encounter: 62.7 kg (138 lb 3.2 oz).    BMI is within normal parameters. No other follow-up for BMI required.    Does the patient have evidence of cognitive impairment? No    Lab Results   Component Value Date    TRIG 99 04/23/2024    HDL 48 04/23/2024    LDL 82 04/23/2024    VLDL 18 04/23/2024    HGBA1C 5.80 (H) 04/23/2024        HEALTH RISK ASSESSMENT    Smoking Status:  Social History     Tobacco Use   Smoking Status Never    Passive exposure: Never   Smokeless Tobacco Never     Alcohol Consumption:  Social History     Substance and Sexual Activity   Alcohol Use Yes    " Alcohol/week: 6.0 standard drinks of alcohol    Types: 6 Shots of liquor per week     Fall Risk Screen:  CARLOS Fall Risk Assessment was completed, and patient is at LOW risk for falls.Assessment completed on:2024    Depression Screenin/25/2024    10:14 AM   PHQ-2/PHQ-9 Depression Screening   Little Interest or Pleasure in Doing Things 0-->not at all   Feeling Down, Depressed or Hopeless 0-->not at all   PHQ-9: Brief Depression Severity Measure Score 0     Health Habits and Functional and Cognitive Screenin/25/2024    10:15 AM   Functional & Cognitive Status   Do you have difficulty preparing food and eating? No   Do you have difficulty bathing yourself, getting dressed or grooming yourself? No   Do you have difficulty using the toilet? No   Do you have difficulty moving around from place to place? No   Do you have trouble with steps or getting out of a bed or a chair? No   Current Diet Well Balanced Diet   Dental Exam Up to date   Eye Exam Up to date   Exercise (times per week) 7 times per week   Current Exercises Include Other;Aerobics   Do you need help using the phone?  No   Are you deaf or do you have serious difficulty hearing?  No   Do you need help to go to places out of walking distance? No   Do you need help shopping? No   Do you need help preparing meals?  No   Do you need help with housework?  No   Do you need help with laundry? No   Do you need help taking your medications? No   Do you need help managing money? No   Do you ever drive or ride in a car without wearing a seat belt? No   Have you felt unusual stress, anger or loneliness in the last month? No   Who do you live with? Spouse   If you need help, do you have trouble finding someone available to you? No   Have you been bothered in the last four weeks by sexual problems? Yes   Do you have difficulty concentrating, remembering or making decisions? Yes     Age-appropriate Screening Schedule:  Refer to the list below for future  screening recommendations based on patient's age, sex and/or medical conditions. Orders for these recommended tests are listed in the plan section. The patient has been provided with a written plan.    Health Maintenance   Topic Date Due    TDAP/TD VACCINES (1 - Tdap) Never done    RSV Vaccine - Adults (1 - 1-dose 60+ series) Never done    Pneumococcal Vaccine 65+ (2 of 2 - PCV) 01/17/2015    COVID-19 Vaccine (7 - 2023-24 season) 01/22/2024    INFLUENZA VACCINE  08/01/2024    ANNUAL WELLNESS VISIT  04/25/2025    ZOSTER VACCINE  Completed        CMS Preventative Services Quick Reference  Risk Factors Identified During Encounter  Immunizations Discussed/Encouraged: Tdap and Influenza  The above risks/problems have been discussed with the patient.  Pertinent information has been shared with the patient in the After Visit Summary.  An After Visit Summary and PPPS were made available to the patient.    Preventative:  Colonoscopy: 2016, due 2026  PSA: undetectable 4/2024. Dx 2009.  DEXA: deferred per patient preference  Shingles: Completed Zosatavax; Shingrix 3/2022  Pneumonia: Pneumovax 2014  Tdap: Recommended  RSV: 9/2023  Influenza: 10/2023, recommended  COVID: Completed 6 shots Pfizer (9/2023) and illness    Follow Up:   Next Medicare Wellness visit to be scheduled in 1 year.       Additional E&M Note during same encounter follows:  Patient has multiple medical problems which are significant and separately identifiable that require additional work above and beyond the Medicare Wellness Visit.      Chief Complaint  Medicare Wellness-subsequent    Subjective        HPI  Henok Beasley is also being seen today for multiple medical problems    Prostate cancer: diagnosed 2009, s/p radioactive seed placement in 2010. Last PSA 4/2024 undetectable. No longer following w/ urology or oncology     Atrial flutter: s/p cardioversion in 2006. Patient did have some tachycardia following COVID. He was started back on metoprolol to help  "improve his mild tachycardia. 3/2023 EKG concerning for a flutter. He has subsequently continued metoprolol 12.5 daily and started warfarin. Currently taking 5mg 4 days/week and 2.5mg 3 days/week. Managed by Justin. Denies palpitations. Follows w/ [facility]- Justin     HTN: 114/76 today. Maintained on amlodipine 5mg QOD and metoprolol 12.5mg daily. Reports swelling in ankles/feet. Reports occasional LH/dizziness. No CP    GERD: rare reflux/heartburn when lying down, mostly managed w/ diet and drinking water. Not taking any medications and prefers not take any. He does report some difficulty swallowing     Nocturia: generally getting up every 2-3 hours at night to urinate. No decrease in stream. Maintained on Flomax 1 pill daily and happy w/ control. Prefers not change.      ED: patient reports using vacuum device along w/ Cialis 20mg daily PRN.  Happy with current control     Back pain: well controlled. Does regular stretches/ yoga every morning.    SOB: patient reports SOB on exertion for the last 20 months, which he feels is due to amlodipine use         Objective   Vital Signs:  /76   Pulse 89   Resp 18   Ht 165.1 cm (65\")   Wt 62.7 kg (138 lb 3.2 oz)   SpO2 96%   BMI 23.00 kg/m²     Physical Exam  Constitutional:       General: He is not in acute distress.     Appearance: He is well-developed.   HENT:      Head: Normocephalic and atraumatic.      Right Ear: Tympanic membrane and external ear normal. There is no impacted cerumen.      Left Ear: Tympanic membrane and external ear normal. There is no impacted cerumen.      Nose: Nose normal.      Mouth/Throat:      Mouth: Mucous membranes are moist.      Pharynx: No oropharyngeal exudate or posterior oropharyngeal erythema.   Eyes:      General: No scleral icterus.        Right eye: No discharge.         Left eye: No discharge.      Extraocular Movements: Extraocular movements intact.      Conjunctiva/sclera: Conjunctivae normal.      Pupils: Pupils are " equal, round, and reactive to light.   Neck:      Thyroid: No thyromegaly.      Vascular: No carotid bruit.   Cardiovascular:      Rate and Rhythm: Normal rate and regular rhythm.      Heart sounds: Normal heart sounds. No murmur heard.  Pulmonary:      Effort: Pulmonary effort is normal. No respiratory distress.      Breath sounds: Normal breath sounds. No wheezing or rales.   Abdominal:      General: Bowel sounds are normal. There is no distension.      Palpations: Abdomen is soft.      Tenderness: There is no abdominal tenderness.   Musculoskeletal:         General: No deformity. Normal range of motion.      Cervical back: Normal range of motion and neck supple.   Lymphadenopathy:      Cervical: No cervical adenopathy.   Skin:     General: Skin is warm and dry.      Findings: No rash.   Neurological:      General: No focal deficit present.      Mental Status: He is alert and oriented to person, place, and time. Mental status is at baseline.      Cranial Nerves: No cranial nerve deficit.      Sensory: No sensory deficit.   Psychiatric:         Behavior: Behavior normal.         Thought Content: Thought content normal.             Assessment and Plan   Diagnoses and all orders for this visit:    1. Medicare annual wellness visit, subsequent (Primary)   - Recent labs reviewed. No need to repeat today   - Counseled regarding exercise and preventative health maintenance items/immunizations below    2. Prostate cancer: diagnosed 2009, s/p radioactive seed placement in 2010. Last PSA 4/2024 undetectable    3. Atrial flutter, unspecified type: s/p cardioversion in 2006. Largely felt to maintain NSR   - Cont home metoprolol 12.5 daily and warfarin 5mg 4 days/week and 2.5mg 3 days/week    4. Hypertension, unspecified type: 114/76 today   - Cont home metoprolol 12.5mg daily   - D/C amlodipine 5mg QOD    -- Monitor BP at home    5. Gastroesophageal reflux disease, unspecified whether esophagitis present: reports notable  dysphagia. Will try 3 month course of PPI. Plan for EGD if inadequate improvement  -     Start pantoprazole (Protonix) 40 MG EC tablet; Take 1 tablet by mouth Daily.  Dispense: 90 tablet; Refill: 1    6. Enlarged prostate without lower urinary tract symptoms (luts): satisfied w/ current control   - Cont home Flomax 0.4mg daily    7. ED (erectile dysfunction) of organic origin   - Cont home Cialis 20mg daily PRN    8. DDD (degenerative disc disease), lumbar: no issues at this time   - Monitor    3 month trail PPI. Plan for EGD if dysphagia not improved  Trial off amlodipine       Follow Up   Return in about 3 months (around 7/25/2024) for Recheck- 30min- GERD, HTN, SOB.  Patient was given instructions and counseling regarding his condition or for health maintenance advice. Please see specific information pulled into the AVS if appropriate.

## 2024-04-30 ENCOUNTER — ANTICOAGULATION VISIT (OUTPATIENT)
Dept: CARDIOLOGY | Facility: CLINIC | Age: 89
End: 2024-04-30
Payer: MEDICARE

## 2024-04-30 VITALS
SYSTOLIC BLOOD PRESSURE: 132 MMHG | DIASTOLIC BLOOD PRESSURE: 97 MMHG | HEART RATE: 87 BPM | BODY MASS INDEX: 22.96 KG/M2 | WEIGHT: 138 LBS

## 2024-04-30 DIAGNOSIS — Z79.01 LONG TERM (CURRENT) USE OF ANTICOAGULANTS: ICD-10-CM

## 2024-04-30 DIAGNOSIS — I48.92 ATRIAL FLUTTER, UNSPECIFIED TYPE: Primary | ICD-10-CM

## 2024-04-30 LAB — INR PPP: 1.8 (ref 2–3)

## 2024-04-30 PROCEDURE — 36416 COLLJ CAPILLARY BLOOD SPEC: CPT | Performed by: INTERNAL MEDICINE

## 2024-04-30 PROCEDURE — 85610 PROTHROMBIN TIME: CPT | Performed by: INTERNAL MEDICINE

## 2024-05-28 ENCOUNTER — ANTICOAGULATION VISIT (OUTPATIENT)
Dept: CARDIOLOGY | Facility: CLINIC | Age: 89
End: 2024-05-28
Payer: MEDICARE

## 2024-05-28 ENCOUNTER — TELEPHONE (OUTPATIENT)
Dept: CARDIOLOGY | Facility: CLINIC | Age: 89
End: 2024-05-28
Payer: MEDICARE

## 2024-05-28 VITALS
WEIGHT: 137 LBS | HEART RATE: 88 BPM | DIASTOLIC BLOOD PRESSURE: 90 MMHG | SYSTOLIC BLOOD PRESSURE: 129 MMHG | BODY MASS INDEX: 23.52 KG/M2

## 2024-05-28 DIAGNOSIS — Z79.01 LONG TERM (CURRENT) USE OF ANTICOAGULANTS: Primary | ICD-10-CM

## 2024-05-28 DIAGNOSIS — I48.92 ATRIAL FLUTTER, UNSPECIFIED TYPE: ICD-10-CM

## 2024-05-28 DIAGNOSIS — I48.92 ATRIAL FLUTTER, UNSPECIFIED TYPE: Primary | ICD-10-CM

## 2024-05-28 DIAGNOSIS — Z79.01 LONG TERM (CURRENT) USE OF ANTICOAGULANTS: ICD-10-CM

## 2024-05-28 LAB — INR PPP: 2.5 (ref 0.9–1.1)

## 2024-05-28 PROCEDURE — 85610 PROTHROMBIN TIME: CPT | Performed by: INTERNAL MEDICINE

## 2024-05-28 PROCEDURE — 36416 COLLJ CAPILLARY BLOOD SPEC: CPT | Performed by: INTERNAL MEDICINE

## 2024-05-28 RX ORDER — WARFARIN SODIUM 5 MG/1
TABLET ORAL
Qty: 120 TABLET | Refills: 0 | Status: SHIPPED | OUTPATIENT
Start: 2024-05-28

## 2024-05-28 NOTE — TELEPHONE ENCOUNTER
Rx Refill Note  Requested Prescriptions     Signed Prescriptions Disp Refills    warfarin (COUMADIN) 5 MG tablet 120 tablet 0     Sig: Take 1 tab, by mouth, daily except 1/2 tab on Sun, Tues and Thurs or as directed,     Authorizing Provider: MILEY POLK     Ordering User: CELESTE DENNIS   Last INR 4/30/24  Last office visit with prescribing clinician: 1/2/2024   Last telemedicine visit with prescribing clinician: Visit date not found   Next office visit with prescribing clinician: 7/9/2024                         Would you like a call back once the refill request has been completed: [] Yes [] No    If the office needs to give you a call back, can they leave a voicemail: [] Yes [] No    Celeste Dennis RN  05/28/24, 11:09 EDT

## 2024-05-28 NOTE — TELEPHONE ENCOUNTER
Incoming Refill Request      Medication requested (name and dose): WARFARIN 5 MG    Pharmacy where request should be sent: [facility]    Additional details provided by patient:     Best call back number: [phone]    Does the patient have less than a 3 day supply:  [] Yes  [x] No    Sandro Hollins Rep  05/28/24, 10:58 EDT

## 2024-07-03 NOTE — PROGRESS NOTES
Encounter Date:07/09/2024  Last seen 1/2/2024      Patient ID: Henok Beasley is a 89 y.o. male.      Chief Complaint:  Tachycardia bradycardia syndrome  History of atrial flutter.  History of sinus bradycardia  Elevated blood pressure.  Incomplete right bundle branch block        History of Present Illness     Since I have last seen, the patient has been without any chest discomfort ,shortness of breath, palpitations, dizziness or syncope.  Denies having any headache ,abdominal pain ,nausea, vomiting , diarrhea constipation, loss of weight or loss of appetite.  Denies having any excessive bruising ,hematuria or blood in the stool.    Review of all systems negative except as indicated.    Reviewed ROS.  Assessment and Plan         [[[[[[[[[[[[[[[[[[[[[[[[  History  ====================  - Tachy-lolis syndrome.  History of atrial flutter.  Patient was maintaining sinus rhythm  Patient is in atrial flutter 1/31/2023            Atrial flutter 3/9/2023  Atrial flutter-6/12/2023  EKG 7/9/2024-atrial flutter     Status post electrical cardioversion and patient had significant sinus pauses after cardioversion in May of 2006. Patient had significant  sinus bradycardia and syncope and Toprol was discontinued with improvement of symptoms.      Echocardiogram 3/9/2023 revealed   Biatrial enlargement.  Concentric left ventricle hypertrophy.  Right ventricle enlargement.  Structurally and functionally normal cardiac valves.  Left ventricular size and contractility is normal with ejection fraction of 60%.      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  Minimal mitral and aortic regurgitation     - Chronic and incomplete right bundle-branch block       -Elevated blood pressure       -History of allergy to horse serum      -Status post hernia repair and right knee surgery.     ====================  Plan  =================  Tachy-lolis syndrome.  History of atrial flutter.  Patient was maintaining sinus  rhythm  Patient was in atrial flutter 1/31/2023            Atrial flutter-EKG- 2/15/2023  EKG 3/9/2023-atrial flutter with controlled ventricular response  Atrial flutter with controlled ventricular response-EKG-6/12/2023  EKG 1/2/2024-atrial flutter with controlled ventricular response.  EKG 7/9/2024-atrial flutter with controlled ventricular response.     Status post electrical cardioversion and patient had significant sinus pauses after cardioversion in May of 2006.  Patient had significant  sinus bradycardia and syncope and Toprol was discontinued with improvement of symptoms.     Continue on Metroprolol tartrate 12.5 mg once a day.  Observe for any bradycardia.     Chronic and incomplete right bundle branch block-stable and asymptomatic     Hypertension-well-controlled-127/96.  Maintain blood pressure log.  Continue metoprolol.  Patient is not having any angina pectoris or congestive heart failure.     Medications were reviewed and updated.     Anticoagulation-continue Coumadin.  PT/INR on a monthly basis.  INR today 2.7  Observe for toxic effects.     EKG showed atrial flutter with controlled ventricular response.-6/12/2023  Options were discussed.  Continuation of anticoagulation and observation since patient is asymptomatic-patient's preference.  Consider cardioversion/ablation.  Consideration of treatment and observation unless patient develops symptoms.     Follow-up in the office in 6 months.     Further plan will depend on patient's progress.     Reviewed and updated-7/9/2024.  [[[[[[[[[[[[[[[[[[[[[[[[[[[[[[[[[[          Diagnosis Plan   1. Long term (current) use of anticoagulants        2. Atrial flutter with controlled response        3. Atrial flutter, unspecified type        4. Palpitations        5. Essential hypertension        6. Tachycardia-bradycardia        7. Bradycardia, sinus        LAB RESULTS (LAST 7 DAYS)    CBC        BMP        CMP         BNP        TROPONIN        CoAg         Creatinine Clearance  CrCl cannot be calculated (Patient's most recent lab result is older than the maximum 30 days allowed.).    ABG        Radiology  No radiology results for the last day                The following portions of the patient's history were reviewed and updated as appropriate: allergies, current medications, past family history, past medical history, past social history, past surgical history, and problem list.    Review of Systems   Constitutional: Negative for malaise/fatigue.   Cardiovascular:  Positive for leg swelling. Negative for chest pain, dyspnea on exertion and palpitations.   Respiratory:  Positive for shortness of breath. Negative for cough.    Gastrointestinal:  Negative for abdominal pain, nausea and vomiting.   Neurological:  Negative for dizziness, focal weakness, headaches, light-headedness and numbness.   All other systems reviewed and are negative.      Current Outpatient Medications:     Ascorbic Acid (VITAMIN C ER) 1000 MG tablet controlled-release, VITAMIN C ER 1000 MG ORAL TABLET EXTENDED RELEASE, Disp: , Rfl:     benzonatate (TESSALON) 200 MG capsule, Take 1 capsule by mouth 3 (Three) Times a Day As Needed for Cough., Disp: 30 capsule, Rfl: 0    Ginkgo Biloba 120 MG tablet, GINKOBA TABS, Disp: , Rfl:     Glucosamine-Chondroitin 250-200 MG tablet, [facility]S GLUCOSAMINE-CHONDROITIN TABS, Disp: , Rfl:     methylPREDNISolone (MEDROL) 4 MG dose pack, Take as directed on package instructions., Disp: 21 tablet, Rfl: 0    metoprolol tartrate (LOPRESSOR) 25 MG tablet, TAKE 1/2 TABLET BY MOUTH TWICE DAILY, Disp: 90 tablet, Rfl: 3    MULTIPLE VITAMIN-FOLIC ACID PO, MULTIVITAMINS TABS, Disp: , Rfl:     pantoprazole (Protonix) 40 MG EC tablet, Take 1 tablet by mouth Daily., Disp: 90 tablet, Rfl: 1    tadalafil (Cialis) 20 MG tablet, Take 1 tablet by mouth Daily As Needed for Erectile Dysfunction., Disp: 30 tablet, Rfl: 1    tamsulosin (FLOMAX) 0.4 MG capsule 24 hr capsule, TAKE 1 CAPSULE  BY MOUTH DAILY, Disp: 90 capsule, Rfl: 3    warfarin (COUMADIN) 5 MG tablet, Take 1 tab, by mouth, daily except 1/2 tab on Sun, Tues and Thurs or as directed,, Disp: 120 tablet, Rfl: 0    Allergies   Allergen Reactions    Other Rash     Horse Serum       Family History   Problem Relation Age of Onset    Cancer Mother     Lung cancer Mother         smoker    Early death Mother         Lung cancer    Miscarriages / Stillbirths Mother     Hyperlipidemia Father     Anuerysm Father         AAA- smoker    Early death Father         AAA    Hyperlipidemia Other     Aneurysm Other         AAA    Hyperlipidemia Other     Stroke Other     Coronary artery disease Other     Anuerysm Paternal Uncle         AAA    Heart attack Daughter     Heart failure Sister         Hypoplastic right coronary artery    Early death Daughter         Hypoplastic coronary artery    Other Paternal Uncle         Aneurisms       Past Surgical History:   Procedure Laterality Date    ADENOIDECTOMY  1939    CATARACT EXTRACTION      COLONOSCOPY  2017    HERNIA REPAIR Right 1967, 2013    R inguinal    KNEE SURGERY Right 1985    R meniscus repair    MASTOIDECTOMY  1942    MOHS SURGERY      TONSILLECTOMY      VASECTOMY  1970       Past Medical History:   Diagnosis Date    Abnormal ECG 1984    Atrial fibrillation     Atrial flutter     S/P CARDIOVERSION    BPH without urinary obstruction     Cataract 2007    DDD (degenerative disc disease), lumbar     ED (erectile dysfunction) of organic origin     Erectile dysfunction 2009    GERD (gastroesophageal reflux disease)     H/O tachycardia-bradycardia syndrome     Hypertension 2023    Left ventricular hypertrophy     Memory loss     Osteoarthritis     Prostate cancer 2010    radioactive seeds placed, in remission    RBBB (right bundle branch block)     Skin cancer     H/O SKIN CANCER    Spinal stenosis, lumbar     Tachycardia     From Long term Covid       Family History   Problem Relation Age of Onset    Cancer  Mother     Lung cancer Mother         smoker    Early death Mother         Lung cancer    Miscarriages / Stillbirths Mother     Hyperlipidemia Father     Anuerysm Father         AAA- smoker    Early death Father         AAA    Hyperlipidemia Other     Aneurysm Other         AAA    Hyperlipidemia Other     Stroke Other     Coronary artery disease Other     Anuerysm Paternal Uncle         AAA    Heart attack Daughter     Heart failure Sister         Hypoplastic right coronary artery    Early death Daughter         Hypoplastic coronary artery    Other Paternal Uncle         Aneurisms       Social History     Socioeconomic History    Marital status:    Tobacco Use    Smoking status: Never     Passive exposure: Never    Smokeless tobacco: Never   Vaping Use    Vaping status: Never Used   Substance and Sexual Activity    Alcohol use: Yes     Alcohol/week: 6.0 standard drinks of alcohol     Types: 6 Shots of liquor per week     Comment: 5-6 times per week    Drug use: Never    Sexual activity: Yes     Partners: Female     Birth control/protection: Post-menopausal, Vasectomy           ECG 12 Lead    Date/Time: 7/9/2024 10:23 AM  Performed by: Calvin Anderson MD    Authorized by: Calvin Anderson MD  Comparison: compared with previous ECG   Similar to previous ECG  Comparison to previous ECG: Atrial flutter with controlled ventricular response.  Normal axis.  No ectopy.  70/min.  No significant change from previous EKG.        Objective:       Physical Exam    There were no vitals taken for this visit.  The patient is alert, oriented and in no distress.    Vital signs as noted above.    Head and neck revealed no carotid bruits or jugular venous distension.  No thyromegaly or lymphadenopathy is present.    Lungs clear.  No wheezing.  Breath sounds are normal bilaterally.    Heart normal first and second heart sounds.  No murmur..  No pericardial rub is present.  No gallop is present.    Abdomen soft and nontender.   No organomegaly is present.    Extremities revealed good peripheral pulses without any pedal edema.    Skin warm and dry.    Musculoskeletal system is grossly normal.    CNS grossly normal.    Reviewed and updated.

## 2024-07-09 ENCOUNTER — OFFICE VISIT (OUTPATIENT)
Dept: CARDIOLOGY | Facility: CLINIC | Age: 89
End: 2024-07-09
Payer: MEDICARE

## 2024-07-09 ENCOUNTER — ANTICOAGULATION VISIT (OUTPATIENT)
Dept: CARDIOLOGY | Facility: CLINIC | Age: 89
End: 2024-07-09
Payer: MEDICARE

## 2024-07-09 VITALS
HEART RATE: 72 BPM | SYSTOLIC BLOOD PRESSURE: 127 MMHG | WEIGHT: 135 LBS | HEIGHT: 65 IN | BODY MASS INDEX: 22.49 KG/M2 | DIASTOLIC BLOOD PRESSURE: 96 MMHG

## 2024-07-09 VITALS
SYSTOLIC BLOOD PRESSURE: 133 MMHG | HEART RATE: 70 BPM | WEIGHT: 135 LBS | BODY MASS INDEX: 22.47 KG/M2 | DIASTOLIC BLOOD PRESSURE: 96 MMHG

## 2024-07-09 DIAGNOSIS — I49.5 TACHYCARDIA-BRADYCARDIA: ICD-10-CM

## 2024-07-09 DIAGNOSIS — I10 ESSENTIAL HYPERTENSION: ICD-10-CM

## 2024-07-09 DIAGNOSIS — I48.92 ATRIAL FLUTTER, UNSPECIFIED TYPE: ICD-10-CM

## 2024-07-09 DIAGNOSIS — I48.92 ATRIAL FLUTTER WITH CONTROLLED RESPONSE: ICD-10-CM

## 2024-07-09 DIAGNOSIS — R00.1 BRADYCARDIA, SINUS: ICD-10-CM

## 2024-07-09 DIAGNOSIS — Z79.01 LONG TERM (CURRENT) USE OF ANTICOAGULANTS: Primary | ICD-10-CM

## 2024-07-09 DIAGNOSIS — R00.2 PALPITATIONS: ICD-10-CM

## 2024-07-09 DIAGNOSIS — I48.92 ATRIAL FLUTTER, UNSPECIFIED TYPE: Primary | ICD-10-CM

## 2024-07-09 DIAGNOSIS — Z79.01 LONG TERM (CURRENT) USE OF ANTICOAGULANTS: ICD-10-CM

## 2024-07-09 LAB — INR PPP: 2.7 (ref 2–3)

## 2024-07-09 PROCEDURE — 1160F RVW MEDS BY RX/DR IN RCRD: CPT | Performed by: INTERNAL MEDICINE

## 2024-07-09 PROCEDURE — 99214 OFFICE O/P EST MOD 30 MIN: CPT | Performed by: INTERNAL MEDICINE

## 2024-07-09 PROCEDURE — 93000 ELECTROCARDIOGRAM COMPLETE: CPT | Performed by: INTERNAL MEDICINE

## 2024-07-09 PROCEDURE — 36416 COLLJ CAPILLARY BLOOD SPEC: CPT | Performed by: INTERNAL MEDICINE

## 2024-07-09 PROCEDURE — 85610 PROTHROMBIN TIME: CPT | Performed by: INTERNAL MEDICINE

## 2024-07-09 PROCEDURE — 1159F MED LIST DOCD IN RCRD: CPT | Performed by: INTERNAL MEDICINE

## 2024-07-11 ENCOUNTER — OFFICE VISIT (OUTPATIENT)
Dept: FAMILY MEDICINE CLINIC | Facility: CLINIC | Age: 89
End: 2024-07-11
Payer: MEDICARE

## 2024-07-11 VITALS
BODY MASS INDEX: 22.63 KG/M2 | RESPIRATION RATE: 18 BRPM | WEIGHT: 135.8 LBS | OXYGEN SATURATION: 98 % | HEIGHT: 65 IN | DIASTOLIC BLOOD PRESSURE: 92 MMHG | SYSTOLIC BLOOD PRESSURE: 124 MMHG | HEART RATE: 42 BPM

## 2024-07-11 DIAGNOSIS — Z79.01 LONG TERM (CURRENT) USE OF ANTICOAGULANTS: ICD-10-CM

## 2024-07-11 DIAGNOSIS — I48.92 ATRIAL FLUTTER, UNSPECIFIED TYPE: ICD-10-CM

## 2024-07-11 DIAGNOSIS — I10 HYPERTENSION, UNSPECIFIED TYPE: ICD-10-CM

## 2024-07-11 DIAGNOSIS — I49.5 SICK SINUS SYNDROME: ICD-10-CM

## 2024-07-11 DIAGNOSIS — R13.10 ODYNOPHAGIA: Primary | ICD-10-CM

## 2024-07-11 PROCEDURE — 1160F RVW MEDS BY RX/DR IN RCRD: CPT | Performed by: FAMILY MEDICINE

## 2024-07-11 PROCEDURE — 1159F MED LIST DOCD IN RCRD: CPT | Performed by: FAMILY MEDICINE

## 2024-07-11 PROCEDURE — 99214 OFFICE O/P EST MOD 30 MIN: CPT | Performed by: FAMILY MEDICINE

## 2024-07-11 RX ORDER — WARFARIN SODIUM 5 MG/1
TABLET ORAL
Qty: 90 TABLET | Refills: 0 | Status: SHIPPED | OUTPATIENT
Start: 2024-07-11 | End: 2024-07-12

## 2024-07-11 NOTE — TELEPHONE ENCOUNTER
Rx Refill Note  Requested Prescriptions     Pending Prescriptions Disp Refills    warfarin (COUMADIN) 5 MG tablet [Pharmacy Med Name: WARFARIN SOD 5MG TABLETS (PEACH)] 90 tablet 0     Sig: TAKE 0.5 TABLET ON SUNDAYS, TUESDAYS AND THURSDAYS AND ONE TABLET BY MOUTH ALL OTHER DAYS OR AS DIRECTED      Last office visit with prescribing clinician: 7/9/2024   Last telemedicine visit with prescribing clinician: Visit date not found   Next office visit with prescribing clinician: 1/15/2025   {    Anticoagulation Visit (07/09/2024)       Belle Mendes LPN  07/11/24, 13:46 EDT

## 2024-07-11 NOTE — PROGRESS NOTES
Chief Complaint   Patient presents with    Hypertension    Heartburn     HPI  Henok Beasley is a 89 y.o. male that presents for   Chief Complaint   Patient presents with    Hypertension    Heartburn     Odynophagia: patient was started on pantoprazole 40mg daily at last visit for dysphagia/odynophagia. He reports no improvement in these symptoms on pantoprazole and never had any heartburn w/ or w/o med. 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 Patient had issue w/ tachycardia following COVID but this seems to have resolved. Reports HR from 44 to 105 at home. 7/9/24 EKG w/ atrial flutter. Follows w/ [facility]- [facility]. Not interested in pacemaker.     HTN: 124/92 today. Amlodipine 2.5 daily was discontinued at last visit. Now maintained on metoprolol 12.5mg daily. No LH/dizziness, CP    Review of Systems  Pertinent positives of ROS documented in HPI    The following portions of the patient's history were reviewed and updated as appropriate: problem list, past medical history, past surgical history, allergies, current medication    Problem List Tab  Patient History Tab  Immunizations Tab  Medications Tab  Chart Review Tab  Care Everywhere Tab  Synopsis Tab    PE  Vitals:    07/11/24 1115   BP: 124/92   Pulse: (!) 42   Resp: 18   SpO2: 98%     Body mass index is 22.6 kg/m².  General: Well nourished, NAD  Head: AT/NC  Eyes: EOMI, anicteric sclera  Resp: CTAB, SCR, BS equal  CV: Irregularly irregular w/o m/r/g; 2+ pulses  GI: Soft, NT/ND, +BS  MSK: FROM, no deformity, no edema  Skin: Warm, dry, intact  Neuro: Alert and oriented. No focal deficits  Psych: Appropriate mood and affect    Imaging  No Images in the past 120 days found..    Assessment & Plan   Henok Beasley is a 89 y.o. male that presents for   Chief Complaint   Patient presents with    Hypertension    Heartburn     Diagnoses and all orders for this visit:    1. Odynophagia (Primary): no improvement w/  pantoprazole  -     Ambulatory referral for Screening EGD  - Ok to wean off pantoprazole 40mg daily    2. Sick sinus syndrome: HR 42 today but improved on physical exam. Likely consequence of irregular rhythm and sampling error. Patient is opposed to pacemaker   - Cont home metoprolol 12.5 daily    -- Discussed risks/benefits of using and discontinuing metoprolol   - Cont [facility] f/u- [facility]    3. Atrial flutter, unspecified type: 7/9/24 EKG reviewed   - Cont home metoprolol 12.5 daily and warfarin  - Cont [facility] f/u- [facility]    4. Hypertension, unspecified type: 124/92 today. Doing well off amlodipine   - Cont home metoprolol 12.5 daily     Return in about 10 months (around 5/11/2025) for Medicare Wellness.

## 2024-07-12 DIAGNOSIS — I48.92 ATRIAL FLUTTER, UNSPECIFIED TYPE: ICD-10-CM

## 2024-07-12 DIAGNOSIS — Z79.01 LONG TERM (CURRENT) USE OF ANTICOAGULANTS: ICD-10-CM

## 2024-07-12 RX ORDER — WARFARIN SODIUM 5 MG/1
TABLET ORAL
Qty: 90 TABLET | Refills: 0 | Status: SHIPPED | OUTPATIENT
Start: 2024-07-12

## 2024-07-12 NOTE — TELEPHONE ENCOUNTER
Rx Refill Note  Requested Prescriptions     Pending Prescriptions Disp Refills    warfarin (COUMADIN) 5 MG tablet [Pharmacy Med Name: WARFARIN SOD 5MG TABLETS (PEACH)] 90 tablet 0     Sig: TAKE 0.5 TABLET BY MOUTH ON SUNDAYS, TUESDAYS AND THURSDAYS AND TAKE 1 TABLET BY MOUTH ON ALL OTHER DAYS   Last INR 7/9/24   Last office visit with prescribing clinician: 7/9/2024   Last telemedicine visit with prescribing clinician: Visit date not found   Next office visit with prescribing clinician: 1/15/2025                         Would you like a call back once the refill request has been completed: [] Yes [] No    If the office needs to give you a call back, can they leave a voicemail: [] Yes [] No    Bev Manriquez RN  07/12/24, 09:53 EDT

## 2024-08-12 ENCOUNTER — ANTICOAGULATION VISIT (OUTPATIENT)
Dept: CARDIOLOGY | Facility: CLINIC | Age: 89
End: 2024-08-12
Payer: MEDICARE

## 2024-08-12 VITALS
HEART RATE: 66 BPM | WEIGHT: 134 LBS | OXYGEN SATURATION: 98 % | SYSTOLIC BLOOD PRESSURE: 135 MMHG | BODY MASS INDEX: 22.3 KG/M2 | DIASTOLIC BLOOD PRESSURE: 86 MMHG

## 2024-08-12 DIAGNOSIS — Z79.01 LONG TERM (CURRENT) USE OF ANTICOAGULANTS: ICD-10-CM

## 2024-08-12 DIAGNOSIS — I48.92 ATRIAL FLUTTER, UNSPECIFIED TYPE: Primary | ICD-10-CM

## 2024-08-12 LAB — INR PPP: 2.1 (ref 2–3)

## 2024-08-12 PROCEDURE — 36416 COLLJ CAPILLARY BLOOD SPEC: CPT | Performed by: INTERNAL MEDICINE

## 2024-08-12 PROCEDURE — 85610 PROTHROMBIN TIME: CPT | Performed by: INTERNAL MEDICINE

## 2024-09-27 ENCOUNTER — ANTICOAGULATION VISIT (OUTPATIENT)
Dept: CARDIOLOGY | Facility: CLINIC | Age: 89
End: 2024-09-27
Payer: MEDICARE

## 2024-09-27 ENCOUNTER — TELEPHONE (OUTPATIENT)
Dept: CARDIOLOGY | Facility: CLINIC | Age: 89
End: 2024-09-27

## 2024-09-27 VITALS
BODY MASS INDEX: 22.17 KG/M2 | SYSTOLIC BLOOD PRESSURE: 128 MMHG | DIASTOLIC BLOOD PRESSURE: 103 MMHG | HEART RATE: 71 BPM | WEIGHT: 133.2 LBS

## 2024-09-27 DIAGNOSIS — I48.92 ATRIAL FLUTTER, UNSPECIFIED TYPE: Primary | ICD-10-CM

## 2024-09-27 DIAGNOSIS — Z79.01 LONG TERM (CURRENT) USE OF ANTICOAGULANTS: ICD-10-CM

## 2024-09-27 LAB — INR PPP: 2.2 (ref 2–3)

## 2024-09-27 PROCEDURE — 36416 COLLJ CAPILLARY BLOOD SPEC: CPT | Performed by: INTERNAL MEDICINE

## 2024-09-27 PROCEDURE — 85610 PROTHROMBIN TIME: CPT | Performed by: INTERNAL MEDICINE

## 2024-09-27 NOTE — TELEPHONE ENCOUNTER
Spoke with Yessi TOLENTINO. She suggest patient monitor blood pressure at home call if it continues to run high. No changes at this present time. Advised pt I will also follow up with him on Monday for some more readings from the weekend. Pt verbalizes understanding apLPN

## 2024-09-27 NOTE — TELEPHONE ENCOUNTER
Pt in office today Bp running 103 diastolic. He states he took his half of metoprolol this morning. He only takes it once a day. He previously took it BID. He just recently got back from [place] so he has not had his normal amount of activity in the last 2 weeks. Advised pt to recheck Blood pressure when he gets home and to continue to monitor it over the next several days. Advised call back if it is still running high at home. Pt denies any swelling or CP. Pt denies feeling bad.     In office readings 132/103 HR 64 and recheck of 128/103 HR 71      Please Advised on any med changes.

## 2024-09-30 NOTE — TELEPHONE ENCOUNTER
Spoke with pt his weekend went well. He is symptom free He is back to his exercise routine. Pt will follow up if anything changes apLPN

## 2024-10-07 ENCOUNTER — TELEPHONE (OUTPATIENT)
Dept: FAMILY MEDICINE CLINIC | Facility: CLINIC | Age: 89
End: 2024-10-07
Payer: MEDICARE

## 2024-10-07 NOTE — TELEPHONE ENCOUNTER
PATIENT CALLED BACK AND SAID HE WOULD LIKE TO SEE IF A [facility] COULD GET HIM IN ANY SOONER. HE ALSO SAID HIS WIFE HAD HER ENDOSCOPY DONE AT [facility] AND HE WANTED TO KNOW IF THAT WAS AN OPTION.

## 2024-10-07 NOTE — TELEPHONE ENCOUNTER
HUB  TO RELAY: I LEFT  FOR PATIENT LETTING HIM KNOW THAT THERE ARE NO OTHER OPTIONS IN [facility] BUT TO LET US KNOW IF HE WANTS REFERRED TO SOMEONE IN [place].

## 2024-10-07 NOTE — TELEPHONE ENCOUNTER
Patient was referred to Dr. Hung with [facility] for an esophagoscopy. They cannot get him in until 11/20. Patient does not feel he can wait that long. Can you refer him to another GI specialist?

## 2024-10-08 DIAGNOSIS — R13.10 ODYNOPHAGIA: Primary | ICD-10-CM

## 2024-10-17 ENCOUNTER — PREP FOR SURGERY (OUTPATIENT)
Dept: OTHER | Facility: HOSPITAL | Age: 89
End: 2024-10-17
Payer: MEDICARE

## 2024-10-17 ENCOUNTER — OFFICE VISIT (OUTPATIENT)
Dept: GASTROENTEROLOGY | Facility: CLINIC | Age: 89
End: 2024-10-17
Payer: MEDICARE

## 2024-10-17 ENCOUNTER — PREP FOR SURGERY (OUTPATIENT)
Dept: SURGERY | Facility: SURGERY CENTER | Age: 89
End: 2024-10-17
Payer: MEDICARE

## 2024-10-17 VITALS
WEIGHT: 134.2 LBS | TEMPERATURE: 95.9 F | HEART RATE: 52 BPM | SYSTOLIC BLOOD PRESSURE: 124 MMHG | OXYGEN SATURATION: 98 % | HEIGHT: 64 IN | DIASTOLIC BLOOD PRESSURE: 80 MMHG | BODY MASS INDEX: 22.91 KG/M2

## 2024-10-17 DIAGNOSIS — Z79.01 CHRONIC ANTICOAGULATION: ICD-10-CM

## 2024-10-17 DIAGNOSIS — Z87.19 HISTORY OF BARRETT'S ESOPHAGUS: ICD-10-CM

## 2024-10-17 DIAGNOSIS — R13.10 DYSPHAGIA, UNSPECIFIED TYPE: ICD-10-CM

## 2024-10-17 DIAGNOSIS — R13.10 ODYNOPHAGIA: ICD-10-CM

## 2024-10-17 DIAGNOSIS — K21.9 GASTROESOPHAGEAL REFLUX DISEASE WITHOUT ESOPHAGITIS: ICD-10-CM

## 2024-10-17 DIAGNOSIS — K21.9 GASTROESOPHAGEAL REFLUX DISEASE WITHOUT ESOPHAGITIS: Primary | ICD-10-CM

## 2024-10-17 DIAGNOSIS — R13.10 DYSPHAGIA, UNSPECIFIED TYPE: Primary | ICD-10-CM

## 2024-10-17 RX ORDER — SODIUM CHLORIDE 0.9 % (FLUSH) 0.9 %
3 SYRINGE (ML) INJECTION EVERY 12 HOURS SCHEDULED
OUTPATIENT
Start: 2024-10-17

## 2024-10-17 RX ORDER — SODIUM CHLORIDE 0.9 % (FLUSH) 0.9 %
10 SYRINGE (ML) INJECTION AS NEEDED
OUTPATIENT
Start: 2024-10-17

## 2024-10-17 NOTE — PROGRESS NOTES
Chief Complaint  Heartburn    Subjective      History of Present Illness  Henok Beasley is an 89-year-old male, new to me and new to our practice.  Patient presents for evaluation of difficulty swallowing.     He has a history of vitamin D deficiency, prediabetes with an A1c of 5.8, GERD, and sick sinus syndrome, which causes his heart rate to fluctuate between 40 and 105. 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 His current medications include metoprolol 12.5 mg and Coumadin 5 mg daily. He does not frequently use NSAIDs such as ibuprofen or Advil. He has a known history of prostate cancer, treated with brachytherapy in 2009, and had a skin tumor removed from his ear.    He is seeking an endoscopy due to difficulty swallowing, particularly solid foods and pills. This issue, which he describes as feeling like a vapor lock, typically occurs during breakfast when he is about a third of the way through his cereal. He rates the associated pain as 7 or 8 out of 10. This problem has been ongoing for less than a year. He reports discomfort in his esophagus but does not experience nausea, vomiting, or abdominal pain. He has a history of Molina's esophagus and underwent an upper endoscopy on 09/06/2017, performed by Dr. Lena Gonsalez. The postoperative diagnosis was gastritis. The upper endoscopy findings suggested a normal esophagus, with no hiatal hernia or inflammation noted at the GE junction. There was mild distal gastritis, but the duodenum was within normal limits. A biopsy was taken for H. pylori. The pathology result of the upper endoscopy showed negative for intestinal metaplasia, benign gastric mucosa with lamina propria congestion, and no H. pylori.     He also had a colonoscopy at the same time, with a postoperative diagnosis of colitis and diverticulosis. The colonoscopy revealed mild colitis in the sigmoid colon and diverticulosis without evidence of  "diverticulitis. There was no evidence of polyps, and the prep was adequate. The colonoscopy pathology showed benign colonic mucosa with mild focal nonspecific colitis, negative for microscopic colitis and inflammatory bowel disease. This report was dictated on 09/08/2017 from [facility] by Dr. Lena Gonsalez.      SOCIAL HISTORY  He drinks little alcohol everyday for years. He drinks either a little red wine or a whiskey-based cocktail only one about 6 times a week. He does not smoke.    FAMILY HISTORY  He denies any family history of colon cancer or esophagus cancer.     Objective   Vital Signs:  /80   Pulse 52   Temp 95.9 °F (35.5 °C)   Ht 162.6 cm (64\")   Wt 60.9 kg (134 lb 3.2 oz)   SpO2 98%   BMI 23.04 kg/m²   Estimated body mass index is 23.04 kg/m² as calculated from the following:    Height as of this encounter: 162.6 cm (64\").    Weight as of this encounter: 60.9 kg (134 lb 3.2 oz).       BMI is within normal parameters. No other follow-up for BMI required.      Physical Exam  Vitals and nursing note reviewed.   Constitutional:       General: He is not in acute distress.     Appearance: Normal appearance. He is normal weight. He is not ill-appearing, toxic-appearing or diaphoretic.   Eyes:      General: No scleral icterus.     Conjunctiva/sclera: Conjunctivae normal.   Cardiovascular:      Rate and Rhythm: Normal rate and regular rhythm.      Pulses: Normal pulses.      Heart sounds: Normal heart sounds.   Pulmonary:      Effort: Pulmonary effort is normal. No respiratory distress.      Breath sounds: Normal breath sounds. No stridor.   Abdominal:      General: Abdomen is flat. Bowel sounds are normal. There is no distension.      Palpations: Abdomen is soft. There is no mass.      Tenderness: There is no abdominal tenderness. There is no right CVA tenderness, left CVA tenderness, guarding or rebound.      Hernia: No hernia is present.   Skin:     Coloration: Skin is not jaundiced or " pale.      Findings: No erythema or rash.   Neurological:      Mental Status: He is alert and oriented to person, place, and time. Mental status is at baseline.   Psychiatric:         Mood and Affect: Mood normal.             Assessment and Plan     Diagnoses and all orders for this visit:    1. Gastroesophageal reflux disease without esophagitis (Primary)    2. Dysphagia, unspecified type    3. Chronic anticoagulation      Discussion  Patient presents to our practice today with the following concerns:  Assessment & Plan  Dysphagia, solid>liquid  GERD  He reports difficulty swallowing, particularly with solid foods, which has been ongoing for less than a year. The pain associated with swallowing is rated as 7 or 8 out of 10 and feels like food gets stuck in the esophagus. An upper endoscopy will be ordered to evaluate the cause of his dysphagia and reassess history of Molina's esophagus. He is advised to wait until after the endoscopy to resume pantoprazole for managing acid reflux.   He is advised to be cautious with alcohol consumption as it can exacerbate acid reflux and potentially cause ulcers. The option to resume pantoprazole will be revisited after the endoscopy.  Antiacid reflux precautions discussed.    Tachycardia-Bradycardia Syndrome.  Chronic anticoagulation for a flutter and arrhythmia  His cardiac history includes tachycardia-bradycardia syndrome, atrial flutter, sinus bradycardia, elevated blood pressure, and incomplete right bundle branch block. An echocardiogram performed on 03/09/2023 revealed bilateral ventricular hypertrophy with a left ventricular ejection fraction of 60 percent, and minimal mitral and aortic regurgitation. He underwent electrical cardioversion in May 2006. He is advised to continue his current regimen of Coumadin 5 mg daily and metoprolol tartrate 12.5 mg daily. Cardioversion/ablation may be considered unless symptoms develop. A follow-up with Dr. Anderson is scheduled for 6  months from 07/09/2024.     Much of this encounter note is an electronic transcription/translation of spoken language to printed text. The electronic translation of spoken language may permit erroneous, or at times, nonsensical words or phrases to be inadvertently transcribed; Although I have reviewed the note for such errors, some may still exist.    Follow Up     After endoscopy    I have reviewed patient's current medications list, relevant clinical information necessary for today's encounter. I discussed the clinical impression and treatment plan with the patient, who verbalized understanding and in agreement.  All questions were answered and support was provided.    Patient or patient representative verbalized consent for the use of Ambient Listening during the visit with  RAJAN Moreland for chart documentation. 10/17/2024  11:54 EDT

## 2024-10-18 PROBLEM — Z79.01 CHRONIC ANTICOAGULATION: Status: ACTIVE | Noted: 2024-10-17

## 2024-10-18 PROBLEM — Z87.19 HISTORY OF BARRETT'S ESOPHAGUS: Status: ACTIVE | Noted: 2024-10-17

## 2024-10-18 PROBLEM — R13.10 DYSPHAGIA: Status: ACTIVE | Noted: 2024-10-17

## 2024-10-18 PROBLEM — R13.10 ODYNOPHAGIA: Status: ACTIVE | Noted: 2024-10-17

## 2024-10-26 DIAGNOSIS — N40.0 BPH WITHOUT URINARY OBSTRUCTION: ICD-10-CM

## 2024-10-28 RX ORDER — TAMSULOSIN HYDROCHLORIDE 0.4 MG/1
1 CAPSULE ORAL DAILY
Qty: 90 CAPSULE | Refills: 3 | Status: SHIPPED | OUTPATIENT
Start: 2024-10-28

## 2024-11-08 ENCOUNTER — ANTICOAGULATION VISIT (OUTPATIENT)
Dept: CARDIOLOGY | Facility: CLINIC | Age: 89
End: 2024-11-08
Payer: MEDICARE

## 2024-11-08 VITALS
DIASTOLIC BLOOD PRESSURE: 108 MMHG | WEIGHT: 134 LBS | SYSTOLIC BLOOD PRESSURE: 143 MMHG | HEART RATE: 71 BPM | BODY MASS INDEX: 23 KG/M2

## 2024-11-08 DIAGNOSIS — I48.92 ATRIAL FLUTTER, UNSPECIFIED TYPE: Primary | ICD-10-CM

## 2024-11-08 DIAGNOSIS — Z79.01 LONG TERM (CURRENT) USE OF ANTICOAGULANTS: ICD-10-CM

## 2024-11-08 LAB — INR PPP: 2 (ref 0.9–1.1)

## 2024-11-08 PROCEDURE — 36416 COLLJ CAPILLARY BLOOD SPEC: CPT | Performed by: INTERNAL MEDICINE

## 2024-11-08 PROCEDURE — 85610 PROTHROMBIN TIME: CPT | Performed by: INTERNAL MEDICINE

## 2024-11-22 ENCOUNTER — TELEPHONE (OUTPATIENT)
Dept: CARDIOLOGY | Facility: CLINIC | Age: 89
End: 2024-11-22
Payer: MEDICARE

## 2024-11-22 NOTE — TELEPHONE ENCOUNTER
Got a call about pt having sx and a cardiac clearance advised we never got one she stated that they right faxed it over its nothing in the pt chart and I advised her of this she stated they just faxed it today advised our doctors are gone for the day we can go forward with clearance Monday if she has faxed it to our office

## 2024-11-25 ENCOUNTER — TELEPHONE (OUTPATIENT)
Dept: GASTROENTEROLOGY | Facility: CLINIC | Age: 89
End: 2024-11-25
Payer: MEDICARE

## 2024-11-25 NOTE — TELEPHONE ENCOUNTER
FACILITY: [facility]  DR: ZION CHRISTIAN  PHONE: [phone]  FAX: [phone]  PROCEDURE: EGD  SCHEDULED: 11/29/24  MEDS TO HOLD: WARFARIN FOR 4 DAYS    PLACED ON PROVIDERS DESK FOR REVIEW.     Office Visit with Calvin Anderson MD (07/09/2024)

## 2024-11-25 NOTE — TELEPHONE ENCOUNTER
PATIENT IS CLEARED FOR PROCEDURE AND CAN HOLD MEDICATION FOR 3-4 DAYS. LETTER HAS BEEN SIGNED AND FAXED BACK TO THE DOCTORS OFFICE.

## 2024-11-29 ENCOUNTER — HOSPITAL ENCOUNTER (OUTPATIENT)
Facility: HOSPITAL | Age: 89
Setting detail: HOSPITAL OUTPATIENT SURGERY
Discharge: HOME | End: 2024-11-29
Attending: INTERNAL MEDICINE | Admitting: INTERNAL MEDICINE
Payer: MEDICARE

## 2024-11-29 ENCOUNTER — ANESTHESIA (OUTPATIENT)
Dept: GASTROENTEROLOGY | Facility: HOSPITAL | Age: 89
End: 2024-11-29
Payer: MEDICARE

## 2024-11-29 ENCOUNTER — ANESTHESIA EVENT (OUTPATIENT)
Dept: GASTROENTEROLOGY | Facility: HOSPITAL | Age: 89
End: 2024-11-29
Payer: MEDICARE

## 2024-11-29 VITALS
DIASTOLIC BLOOD PRESSURE: 72 MMHG | HEART RATE: 64 BPM | RESPIRATION RATE: 16 BRPM | OXYGEN SATURATION: 97 % | SYSTOLIC BLOOD PRESSURE: 105 MMHG

## 2024-11-29 DIAGNOSIS — Z87.19 HISTORY OF BARRETT'S ESOPHAGUS: ICD-10-CM

## 2024-11-29 DIAGNOSIS — Z79.01 CHRONIC ANTICOAGULATION: ICD-10-CM

## 2024-11-29 DIAGNOSIS — K21.9 GASTROESOPHAGEAL REFLUX DISEASE WITHOUT ESOPHAGITIS: ICD-10-CM

## 2024-11-29 DIAGNOSIS — R13.10 ODYNOPHAGIA: ICD-10-CM

## 2024-11-29 DIAGNOSIS — R13.10 DYSPHAGIA, UNSPECIFIED TYPE: ICD-10-CM

## 2024-11-29 PROCEDURE — 88305 TISSUE EXAM BY PATHOLOGIST: CPT | Performed by: INTERNAL MEDICINE

## 2024-11-29 PROCEDURE — 88341 IMHCHEM/IMCYTCHM EA ADD ANTB: CPT | Performed by: INTERNAL MEDICINE

## 2024-11-29 PROCEDURE — 25810000003 LACTATED RINGERS PER 1000 ML: Performed by: INTERNAL MEDICINE

## 2024-11-29 PROCEDURE — 25010000002 LIDOCAINE 2% SOLUTION: Performed by: NURSE ANESTHETIST, CERTIFIED REGISTERED

## 2024-11-29 PROCEDURE — C1726 CATH, BAL DIL, NON-VASCULAR: HCPCS | Performed by: INTERNAL MEDICINE

## 2024-11-29 PROCEDURE — 88342 IMHCHEM/IMCYTCHM 1ST ANTB: CPT | Performed by: INTERNAL MEDICINE

## 2024-11-29 PROCEDURE — 43249 ESOPH EGD DILATION <30 MM: CPT | Performed by: INTERNAL MEDICINE

## 2024-11-29 PROCEDURE — 88312 SPECIAL STAINS GROUP 1: CPT | Performed by: INTERNAL MEDICINE

## 2024-11-29 PROCEDURE — 25010000002 PROPOFOL 10 MG/ML EMULSION: Performed by: NURSE ANESTHETIST, CERTIFIED REGISTERED

## 2024-11-29 PROCEDURE — 43239 EGD BIOPSY SINGLE/MULTIPLE: CPT | Performed by: INTERNAL MEDICINE

## 2024-11-29 RX ORDER — SODIUM CHLORIDE 9 MG/ML
40 INJECTION, SOLUTION INTRAVENOUS AS NEEDED
Status: DISCONTINUED | OUTPATIENT
Start: 2024-11-29 | End: 2024-11-29 | Stop reason: HOSPADM

## 2024-11-29 RX ORDER — SODIUM CHLORIDE, SODIUM LACTATE, POTASSIUM CHLORIDE, CALCIUM CHLORIDE 600; 310; 30; 20 MG/100ML; MG/100ML; MG/100ML; MG/100ML
30 INJECTION, SOLUTION INTRAVENOUS CONTINUOUS PRN
Status: DISCONTINUED | OUTPATIENT
Start: 2024-11-29 | End: 2024-11-29 | Stop reason: HOSPADM

## 2024-11-29 RX ORDER — LIDOCAINE HYDROCHLORIDE 20 MG/ML
INJECTION, SOLUTION INFILTRATION; PERINEURAL AS NEEDED
Status: DISCONTINUED | OUTPATIENT
Start: 2024-11-29 | End: 2024-11-29 | Stop reason: SURG

## 2024-11-29 RX ORDER — SODIUM CHLORIDE 0.9 % (FLUSH) 0.9 %
10 SYRINGE (ML) INJECTION EVERY 12 HOURS SCHEDULED
Status: DISCONTINUED | OUTPATIENT
Start: 2024-11-29 | End: 2024-11-29 | Stop reason: HOSPADM

## 2024-11-29 RX ORDER — SODIUM CHLORIDE 0.9 % (FLUSH) 0.9 %
10 SYRINGE (ML) INJECTION AS NEEDED
Status: DISCONTINUED | OUTPATIENT
Start: 2024-11-29 | End: 2024-11-29 | Stop reason: HOSPADM

## 2024-11-29 RX ORDER — PROPOFOL 10 MG/ML
VIAL (ML) INTRAVENOUS AS NEEDED
Status: DISCONTINUED | OUTPATIENT
Start: 2024-11-29 | End: 2024-11-29 | Stop reason: SURG

## 2024-11-29 RX ADMIN — SODIUM CHLORIDE, SODIUM LACTATE, POTASSIUM CHLORIDE, CALCIUM CHLORIDE 30 ML/HR: 20; 30; 600; 310 INJECTION, SOLUTION INTRAVENOUS at 09:08

## 2024-11-29 RX ADMIN — PROPOFOL 20 MG: 10 INJECTION, EMULSION INTRAVENOUS at 09:31

## 2024-11-29 RX ADMIN — PROPOFOL 20 MG: 10 INJECTION, EMULSION INTRAVENOUS at 09:37

## 2024-11-29 RX ADMIN — LIDOCAINE HYDROCHLORIDE 60 MG: 20 INJECTION, SOLUTION INFILTRATION; PERINEURAL at 09:28

## 2024-11-29 RX ADMIN — PROPOFOL 20 MG: 10 INJECTION, EMULSION INTRAVENOUS at 09:34

## 2024-11-29 RX ADMIN — PROPOFOL 50 MG: 10 INJECTION, EMULSION INTRAVENOUS at 09:28

## 2024-11-29 NOTE — NURSING NOTE
Pt on Coumadin and last dose was last Sunday. Dr. Farmer aware and no need for PT/INR prior to procedure as per Dr. Farmer.

## 2024-11-29 NOTE — ANESTHESIA PREPROCEDURE EVALUATION
Anesthesia Evaluation                  Airway   Mallampati: II  Dental      Pulmonary    (-) sleep apnea, not a smoker    ROS comment: Negative patient screen for [facility]    Cardiovascular     (+) hypertension, dysrhythmias Atrial Flutter      Neuro/Psych  GI/Hepatic/Renal/Endo    (+) GERD    Musculoskeletal     Abdominal    Substance History      OB/GYN          Other                    Anesthesia Plan    ASA 3     MAC       Anesthetic plan, risks, benefits, and alternatives have been provided, discussed and informed consent has been obtained with: patient.    CODE STATUS:

## 2024-11-29 NOTE — DISCHARGE INSTRUCTIONS
For the next 24 hours patient needs to be with a responsible adult.    For 24 hours DO NOT drive, operate machinery, appliances, drink alcohol, make important decisions or sign legal documents.    Start with a light or bland diet if you are feeling sick to your stomach otherwise advance to regular diet as tolerated.    Follow recommendations on procedure report if provided by your doctor.    Call Dr Farmer for problems [phone]    Problems may include but not limited to: large amounts of bleeding, trouble breathing, repeated vomiting, severe unrelieved pain, fever or chills.

## 2024-11-29 NOTE — H&P
No chief complaint on file.      HPI  Patient today for an EGD due to GERD and dysphagia.         Problem List:    Patient Active Problem List   Diagnosis    Atrial flutter    Degeneration of intervertebral disc of lumbar region    Enlarged prostate without lower urinary tract symptoms (luts)    GERD (gastroesophageal reflux disease)    History of hematuria    History of malignant neoplasm of prostate    History of malignant neoplasm of skin    Hypertension    Impotence of organic origin    Left ventricular hypertrophy    Memory loss    Osteoarthritis    RBBB    Sick sinus syndrome    Spinal stenosis, lumbar    Syncope    Prostate cancer    ED (erectile dysfunction) of organic origin    DDD (degenerative disc disease), lumbar    BPH without urinary obstruction    Bilateral pseudophakia    Vitreous opacities    Long term (current) use of anticoagulants    Dysphagia    Chronic anticoagulation    Odynophagia    History of Molina's esophagus       Medical History:    Past Medical History:   Diagnosis Date    Abnormal ECG 1984    Atrial fibrillation     Atrial flutter     S/P CARDIOVERSION    BPH without urinary obstruction     Cataract 2007    DDD (degenerative disc disease), lumbar     ED (erectile dysfunction) of organic origin     Erectile dysfunction 2009    GERD (gastroesophageal reflux disease)     H/O tachycardia-bradycardia syndrome     Hypertension 2023    Left ventricular hypertrophy     Memory loss     Osteoarthritis     Prostate cancer 2010    radioactive seeds placed, in remission    RBBB (right bundle branch block)     Skin cancer     H/O SKIN CANCER    Spinal stenosis, lumbar     Tachycardia     From Long term Covid        Social History:    Social History     Socioeconomic History    Marital status:    Tobacco Use    Smoking status: Never     Passive exposure: Never    Smokeless tobacco: Never   Vaping Use    Vaping status: Never Used   Substance and Sexual Activity    Alcohol use: Yes      Alcohol/week: 6.0 standard drinks of alcohol     Types: 6 Shots of liquor per week     Comment: 5-6 times per week    Drug use: Never    Sexual activity: Yes     Partners: Female     Birth control/protection: Post-menopausal, Vasectomy       Family History:   Family History   Problem Relation Age of Onset    Cancer Mother     Lung cancer Mother         smoker    Early death Mother         Lung cancer    Miscarriages / Stillbirths Mother     Hyperlipidemia Father     Anuerysm Father         AAA- smoker    Early death Father         AAA    Heart failure Sister         Hypoplastic right coronary artery    Anuerysm Paternal Uncle         AAA    Heart attack Daughter     Early death Daughter         Hypoplastic coronary artery    Early death Son         Auto collision    Hyperlipidemia Other     Aneurysm Other         AAA    Hyperlipidemia Other     Stroke Other     Coronary artery disease Other     Colon cancer Neg Hx     Colon polyps Neg Hx     Crohn's disease Neg Hx     Irritable bowel syndrome Neg Hx     Ulcerative colitis Neg Hx        Surgical History:   Past Surgical History:   Procedure Laterality Date    ADENOIDECTOMY  1939    CATARACT EXTRACTION      COLONOSCOPY  2017    HERNIA REPAIR Right 1967, 2013    R inguinal    KNEE SURGERY Right 1985    R meniscus repair    MASTOIDECTOMY  1942    MOHS SURGERY      TONSILLECTOMY      UPPER GASTROINTESTINAL ENDOSCOPY      VASECTOMY  1970       No current facility-administered medications for this encounter.    Allergies:   Allergies   Allergen Reactions    Other Rash     Horse Serum        The following portions of the patient's history were reviewed by me and updated as appropriate: review of systems, allergies, current medications, past family history, past medical history, past social history, past surgical history and problem list.    There were no vitals filed for this visit.    PHYSICAL EXAM:    CONSTITUTIONAL:  today's vital signs reviewed by me  GASTROINTESTINAL:  abdomen is soft nontender nondistended with normal active bowel sounds, no masses are appreciated    Assessment/ Plan  We will proceed today with EGD.    Risks and benefits as well as alternatives to endoscopic evaluation were explained to the patient and they voiced understanding and wish to proceed.  These risks include but are not limited to the risk of bleeding, perforation, adverse reaction to sedation, and missed lesions.  The patient was given the opportunity to ask questions prior to the endoscopic procedure.

## 2024-11-29 NOTE — ANESTHESIA POSTPROCEDURE EVALUATION
Patient: Henok Beasley    Procedure Summary       Date: 11/29/24 Room / Location:  [facility]    Anesthesia Start: 0919 Anesthesia Stop: 0948    Procedure: ESOPHAGOGASTRODUODENOSCOPY with cold forcep biopsies, dilation of schatzki's ring (Esophagus) Diagnosis:       Gastroesophageal reflux disease without esophagitis      Dysphagia, unspecified type      Odynophagia      Chronic anticoagulation      History of Molina's esophagus      (Gastroesophageal reflux disease without esophagitis [K21.9])      (Dysphagia, unspecified type [R13.10])      (Odynophagia [R13.10])      (Chronic anticoagulation [Z79.01])      (History of Molina's esophagus [Z87.19])    Surgeons: Zev Farmer MD Provider: David Dasilva MD    Anesthesia Type: MAC ASA Status: 3            Anesthesia Type: MAC    Vitals  Vitals Value Taken Time   /72 11/29/24 1005   Temp     Pulse 64 11/29/24 1005   Resp 16 11/29/24 1005   SpO2 97 % 11/29/24 1005           Post Anesthesia Care and Evaluation    Pain management: adequate    Airway patency: patent  Anesthetic complications: No anesthetic complications    Cardiovascular status: acceptable  Respiratory status: acceptable  Hydration status: acceptable    Comments: /72 (BP Location: Left arm, Patient Position: Lying)   Pulse 64   Resp 16   SpO2 97%

## 2024-12-03 LAB
CYTO UR: NORMAL
LAB AP CASE REPORT: NORMAL
PATH REPORT.ADDENDUM SPEC: NORMAL
PATH REPORT.FINAL DX SPEC: NORMAL
PATH REPORT.GROSS SPEC: NORMAL

## 2024-12-06 ENCOUNTER — TELEPHONE (OUTPATIENT)
Dept: GASTROENTEROLOGY | Facility: CLINIC | Age: 89
End: 2024-12-06
Payer: MEDICARE

## 2024-12-06 NOTE — TELEPHONE ENCOUNTER
Patient left a Voice Message returning a call from Tiffanie. Requested a call back.    [phone].

## 2024-12-06 NOTE — TELEPHONE ENCOUNTER
Hub staff attempted to follow warm transfer process and was unsuccessful     Caller: Henok Beasley    Relationship to patient: Self    Best call back number: [phone]    Patient is needing: PATIENT CALLING BACK ABOUT RESULTS. MISSED CALL. PLEASE REACH OUT TO ASSIST. THANK YOU.

## 2024-12-13 ENCOUNTER — ANTICOAGULATION VISIT (OUTPATIENT)
Dept: CARDIOLOGY | Facility: CLINIC | Age: 89
End: 2024-12-13
Payer: MEDICARE

## 2024-12-13 VITALS
WEIGHT: 134 LBS | SYSTOLIC BLOOD PRESSURE: 154 MMHG | HEART RATE: 71 BPM | DIASTOLIC BLOOD PRESSURE: 92 MMHG | BODY MASS INDEX: 23 KG/M2

## 2024-12-13 DIAGNOSIS — Z79.01 LONG TERM (CURRENT) USE OF ANTICOAGULANTS: ICD-10-CM

## 2024-12-13 DIAGNOSIS — I48.92 ATRIAL FLUTTER, UNSPECIFIED TYPE: Primary | ICD-10-CM

## 2024-12-13 LAB — INR PPP: 1.8 (ref 2–3)

## 2024-12-13 PROCEDURE — 85610 PROTHROMBIN TIME: CPT | Performed by: INTERNAL MEDICINE

## 2024-12-13 PROCEDURE — 36416 COLLJ CAPILLARY BLOOD SPEC: CPT | Performed by: INTERNAL MEDICINE

## 2024-12-13 RX ORDER — PANTOPRAZOLE SODIUM 40 MG/1
TABLET, DELAYED RELEASE ORAL
COMMUNITY
Start: 2024-12-10

## 2024-12-18 NOTE — PROGRESS NOTES
Chief Complaint  Heartburn and GI Problem (Review EGD result.)    Subjective        Henok Beasley is an 89-year-old male, presents today for follow-up from previous visit with this NP on 10/17/2024 for dysphagia.    Office Visit with Husam Monroy APRN (10/17/2024)     He has a history of vitamin D deficiency, prediabetes with an A1c of 5.8, GERD, and sick sinus syndrome, which causes his heart rate to fluctuate between 40 and 105. 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 His current medications include metoprolol 12.5 mg and Coumadin 5 mg daily. He does not frequently use NSAIDs such as ibuprofen or Advil. He has a known history of prostate cancer, treated with brachytherapy in 2009, and had a skin tumor removed from his ear.    Previous visit, patient was recommended for EGD due to a complaint of dysphagia.  He is here today to review the result.    History of Present Illness  The patient is an 89-year-old male who presents today for a follow-up visit.    He was last seen on 10/17/2024 due to difficulty swallowing. He reports a slight improvement in his dysphagia but continues to experience acid reflux symptoms, although they have been somewhat alleviated.     He experiences abdominal pain during episodes of dysphagia, typically lasting a few minutes during breakfast. He also reports difficulty with burping. He does not experience nausea, vomiting, hematochezia, or abnormal bowel movements.     His diet includes daily consumption of oranges and apple juice in the morning. He underwent an upper endoscopy as recommended. He resumed his Protonix 40 mg daily regimen post-procedure, having previously discontinued it after a 6-month course less than a year ago.    He is currently on Coumadin.    MEDICATIONS  Coumadin, Protonix, calcium, vitamin D.      Results Reviewed:    Upper GI Endoscopy (11/29/2024 09:17)   - 1 cm hiatal hernia.   - Mild Schatzki ring. Dilated.   -  Esophageal ulcer with no stigmata of recent bleeding. Biopsied.   - Z-line irregular, at the gastroesophageal junction. Biopsied.   - Gastritis, characterized by erythema. Biopsied.   - Normal examined duodenum    Tissue Pathology Exam (11/29/2024 09:33)   -Extensive lymphocytic infiltrate and focal acute inflammation of the esophageal squamous mucosa.  -No intestinal metaplasia  -Ulceration and acute inflammation of the esophagus  - Recommend PPI due to ulceration and inflammation in the distal esophagus above.  Viral stains for fungal and virus are negative.    Interval history related to EGD/CLS in 2017:  EGD 9/6/2017 for history of Molina's esophagus, performed by Dr. Lena Gonsalez. The postoperative diagnosis was gastritis. The upper endoscopy findings suggested a normal esophagus, with no hiatal hernia or inflammation noted at the GE junction. There was mild distal gastritis, but the duodenum was within normal limits. A biopsy was taken for H. pylori.     The EGD pathology result of the upper endoscopy showed negative for intestinal metaplasia, benign gastric mucosa with lamina propria congestion, and no H. pylori.     He also had a colonoscopy at the same time, with a postoperative diagnosis of colitis and diverticulosis. The colonoscopy revealed mild colitis in the sigmoid colon and diverticulosis without evidence of diverticulitis. There was no evidence of polyps, and the prep was adequate.     The colonoscopy pathology showed benign colonic mucosa with mild focal nonspecific colitis, negative for microscopic colitis and inflammatory bowel disease. This report was dictated on 09/08/2017 from [facility] by Dr. Lena Gonsalez.      SOCIAL HISTORY  He drinks little alcohol everyday for years. He drinks either a little red wine or a whiskey-based cocktail only one about 6 times a week. He does not smoke.    FAMILY HISTORY  He denies any family history of colon cancer or esophagus cancer.    "  Objective   Vital Signs:  /84   Pulse 50   Temp 96.4 °F (35.8 °C)   Ht 165.1 cm (65\")   Wt 61.7 kg (136 lb)   SpO2 96%   BMI 22.63 kg/m²   Estimated body mass index is 22.63 kg/m² as calculated from the following:    Height as of this encounter: 165.1 cm (65\").    Weight as of this encounter: 61.7 kg (136 lb).       BMI is within normal parameters. No other follow-up for BMI required.      Physical Exam  Vitals and nursing note reviewed.   Constitutional:       General: He is not in acute distress.     Appearance: Normal appearance. He is normal weight. He is not ill-appearing, toxic-appearing or diaphoretic.   Eyes:      General: No scleral icterus.     Conjunctiva/sclera: Conjunctivae normal.   Cardiovascular:      Rate and Rhythm: Normal rate and regular rhythm.      Pulses: Normal pulses.      Heart sounds: Normal heart sounds.   Pulmonary:      Effort: Pulmonary effort is normal. No respiratory distress.      Breath sounds: Normal breath sounds. No stridor.   Abdominal:      General: Abdomen is flat. Bowel sounds are normal. There is no distension.      Palpations: Abdomen is soft. There is no mass.      Tenderness: There is no abdominal tenderness. There is no right CVA tenderness, left CVA tenderness, guarding or rebound.      Hernia: No hernia is present.   Skin:     Coloration: Skin is not jaundiced or pale.      Findings: No erythema or rash.   Neurological:      Mental Status: He is alert and oriented to person, place, and time. Mental status is at baseline.   Psychiatric:         Mood and Affect: Mood normal.             Assessment and Plan     Diagnoses and all orders for this visit:    1. Dysphagia, unspecified type (Primary)    2. History of Molina's esophagus    3. Schatzki ring of distal esophagus    4. Ulcerative esophagitis    5. Gastritis without bleeding, unspecified chronicity, unspecified gastritis type    6. Hiatal hernia    Other orders  -     sucralfate (CARAFATE) 1 g tablet; " Take 1 tablet by mouth 3 (Three) Times a Day Before Meals. Ensure no other medications within 1 hour of dose or 2 to 4 hours after taking  Dispense: 120 tablet; Refill: 1  -     pantoprazole (PROTONIX) 40 MG EC tablet; Take 1 tablet by mouth 2 (Two) Times a Day for 30 days.  Dispense: 60 tablet; Refill: 3      Assessment & Plan  1. Molina's esophagus.  2. Schatzki's ring  3. GERD with ulcerative esphagitis  4.  Hiatal hernia  5. Dysphagia, solid>liquid  The patient's condition is characterized by a 1 cm hiatal hernia, Schatzki ring, esophageal ulcer, irregular Z-line, and gastritis. The duodenum is normal, and there is no evidence of cancer.   The presence of a hiatal hernia predisposes him to acid reflux, which has led to the development of an irregular Z-line and Schatzki ring due to chronic acid exposure.   The esophagitis may be contributing to his dysphagia and discomfort.   Viral and fungal staining (CMV, HSV-1, HSV-2 and GMS stains) of the distal esophagus was negative. The patient has been on pantoprazole 40 mg daily, but this dosage appears insufficient based on the current findings. He will increase the pantoprazole dosage to 40 mg twice daily for 8 weeks, after which he will revert to the once-daily regimen.   A prescription for sucralfate has been provided, which he will take 30 minutes before meals for several weeks to allow healing.   Antiacid reflux precautions discussed, he is advised to avoid acidic foods such as citrus fruits, greasy foods, and spicy foods. He will continue taking calcium and vitamin D supplements to mitigate the risk of osteoporosis associated with long-term pantoprazole use.     2. Medication management.  The patient is currently on Coumadin.    Follow-up  The patient will follow up in 6 months or sooner if necessary.    PROCEDURE  Upper endoscopy revealed a 1 cm hiatal hernia, Schatzki ring, esophageal ulcer, irregular Z-line, gastritis, and normal duodenum.    I have reviewed  patient's current medications list, relevant clinical information necessary for today's encounter. I discussed the clinical impression and treatment plan with the patient, who verbalized understanding and in agreement.  All questions were answered and support was provided.    Patient or patient representative verbalized consent for the use of Ambient Listening during the visit with  RAJAN Moreland for chart documentation. 12/19/2024  15:22 EST

## 2024-12-19 ENCOUNTER — OFFICE VISIT (OUTPATIENT)
Dept: GASTROENTEROLOGY | Facility: CLINIC | Age: 89
End: 2024-12-19
Payer: MEDICARE

## 2024-12-19 VITALS
OXYGEN SATURATION: 96 % | HEART RATE: 50 BPM | DIASTOLIC BLOOD PRESSURE: 84 MMHG | SYSTOLIC BLOOD PRESSURE: 142 MMHG | BODY MASS INDEX: 22.66 KG/M2 | TEMPERATURE: 96.4 F | WEIGHT: 136 LBS | HEIGHT: 65 IN

## 2024-12-19 DIAGNOSIS — R13.10 DYSPHAGIA, UNSPECIFIED TYPE: Primary | ICD-10-CM

## 2024-12-19 DIAGNOSIS — K22.10 ULCERATIVE ESOPHAGITIS: ICD-10-CM

## 2024-12-19 DIAGNOSIS — K29.70 GASTRITIS WITHOUT BLEEDING, UNSPECIFIED CHRONICITY, UNSPECIFIED GASTRITIS TYPE: ICD-10-CM

## 2024-12-19 DIAGNOSIS — K22.2 SCHATZKI RING OF DISTAL ESOPHAGUS: ICD-10-CM

## 2024-12-19 DIAGNOSIS — K44.9 HIATAL HERNIA: ICD-10-CM

## 2024-12-19 DIAGNOSIS — Z87.19 HISTORY OF BARRETT'S ESOPHAGUS: ICD-10-CM

## 2024-12-19 PROCEDURE — 99214 OFFICE O/P EST MOD 30 MIN: CPT | Performed by: NURSE PRACTITIONER

## 2024-12-19 RX ORDER — SUCRALFATE 1 G/1
1 TABLET ORAL
Qty: 120 TABLET | Refills: 1 | Status: SHIPPED | OUTPATIENT
Start: 2024-12-19

## 2024-12-19 RX ORDER — PANTOPRAZOLE SODIUM 40 MG/1
40 TABLET, DELAYED RELEASE ORAL 2 TIMES DAILY
Qty: 60 TABLET | Refills: 3 | Status: SHIPPED | OUTPATIENT
Start: 2024-12-19 | End: 2025-01-18

## 2025-01-13 NOTE — PROGRESS NOTES
Encounter Date:01/15/2025    Last seen-7/9/2024      Patient ID: Henok Beasley is a 89 y.o. male.    Chief Complaint:  Tachycardia bradycardia syndrome  History of atrial flutter.  History of sinus bradycardia  Elevated blood pressure.  Incomplete right bundle branch block        History of Present Illness  Patient was diagnosed to have Molina's esophagus recently.    Since I have last seen, the patient has been without any chest discomfort ,shortness of breath, palpitations, dizziness or syncope.  Denies having any headache ,abdominal pain ,nausea, vomiting , diarrhea constipation, loss of weight or loss of appetite.  Denies having any excessive bruising ,hematuria or blood in the stool.    Review of all systems negative except as indicated.    Reviewed ROS.    Assessment and Plan         [[[[[[[[[[[[[[[[[[[[[[[[  History  ====================  - Tachy-lolis syndrome.  History of atrial flutter.  Patient was maintaining sinus rhythm  Patient is in atrial flutter 1/31/2023            Atrial flutter 3/9/2023  Atrial flutter-6/12/2023  EKG 7/9/2024-atrial flutter     Status post electrical cardioversion and patient had significant sinus pauses after cardioversion in May of 2006. Patient had significant  sinus bradycardia and syncope and Toprol was discontinued with improvement of symptoms.      Echocardiogram 3/9/2023 revealed   Biatrial enlargement.  Concentric left ventricle hypertrophy.  Right ventricle enlargement.  Structurally and functionally normal cardiac valves.  Left ventricular size and contractility is normal with ejection fraction of 60%.      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  Minimal mitral and aortic regurgitation     - Chronic and incomplete right bundle-branch block       -Elevated blood pressure    - Molina's esophagus.      -History of allergy to horse serum      -Status post hernia repair and right knee surgery.      ====================  Plan  =================  Tachybradycardia syndrome.  History of atrial flutter.  Patient was maintaining sinus rhythm  Patient was in atrial flutter 1/31/2023            Atrial flutter-EKG- 2/15/2023  EKG 3/9/2023-atrial flutter with controlled ventricular response  Atrial flutter with controlled ventricular response-EKG-6/12/2023  EKG 1/2/2024-atrial flutter with controlled ventricular response.  EKG 7/9/2024-atrial flutter with controlled ventricular response.  EKG 1/15/2025-atrial flutter with controlled ventricular response right bundle branch block.     Status post electrical cardioversion and patient had significant sinus pauses after cardioversion in May of 2006.  Patient had significant  sinus bradycardia and syncope and Toprol was discontinued with improvement of symptoms.     Continue on Metroprolol tartrate 12.5 mg once a day.  Observe for bradycardia.    Chronic and incomplete right bundle branch block stable and asymptomatic     Hypertension  149/89.  Maintain blood pressure log.  Continue metoprolol.    Patient is not having any angina pectoris or congestive heart failure.    Anticoagulation status reviewed.  PT/INR on a monthly basis.  INR today 2.4.  Observe for toxic effects.    EKG showed atrial flutter with controlled ventricular response.-6/12/2023  Options were discussed.  Continuation of anticoagulation and observation since patient is asymptomatic-patient's preference.  Sedation for cardioversion/ablation.  Consideration of treatment and observation unless patient develops symptoms.     Follow-up in the office in 6 months.    Further plan will depend on patient's progress.     Reviewed and updated 1/15/2025  [[[[[[[[[[[[[[[[[[[[[[[[[[[[[[[[[[        Diagnosis Plan   1. Atrial flutter, unspecified type        2. Palpitations        3. Essential hypertension        4. Long term (current) use of anticoagulants        5. Atrial flutter with controlled response        6.  Tachycardia-bradycardia        7. Bradycardia, sinus        LAB RESULTS (LAST 7 DAYS)    CBC        BMP        CMP         BNP        TROPONIN        CoAg        Creatinine Clearance  CrCl cannot be calculated (Patient's most recent lab result is older than the maximum 30 days allowed.).    ABG        Radiology  No radiology results for the last day                The following portions of the patient's history were reviewed and updated as appropriate: allergies, current medications, past family history, past medical history, past social history, past surgical history, and problem list.    Review of Systems   Constitutional: Negative for malaise/fatigue.   Cardiovascular:  Negative for chest pain, dyspnea on exertion, leg swelling and palpitations.   Respiratory:  Negative for cough and shortness of breath.    Gastrointestinal:  Negative for abdominal pain, nausea and vomiting.   Neurological:  Negative for dizziness, focal weakness, headaches, light-headedness and numbness.   All other systems reviewed and are negative.      Current Outpatient Medications:   •  Ascorbic Acid (VITAMIN C ER) 1000 MG tablet controlled-release, VITAMIN C ER 1000 MG ORAL TABLET EXTENDED RELEASE, Disp: , Rfl:   •  Ginkgo Biloba 120 MG tablet, GINKOBA TABS, Disp: , Rfl:   •  Glucosamine-Chondroitin 250-200 MG tablet, [facility] GLUCOSAMINE-CHONDROITIN TABS, Disp: , Rfl:   •  metoprolol tartrate (LOPRESSOR) 25 MG tablet, TAKE 1/2 TABLET BY MOUTH TWICE DAILY (Patient taking differently: Take 0.5 tablets by mouth Daily. Patient taking 1/2 daily), Disp: 90 tablet, Rfl: 3  •  MULTIPLE VITAMIN-FOLIC ACID PO, MULTIVITAMINS TABS, Disp: , Rfl:   •  pantoprazole (PROTONIX) 40 MG EC tablet, Take 1 tablet by mouth 2 (Two) Times a Day for 30 days., Disp: 60 tablet, Rfl: 3  •  sucralfate (CARAFATE) 1 g tablet, Take 1 tablet by mouth 3 (Three) Times a Day Before Meals. Ensure no other medications within 1 hour of dose or 2 to 4 hours after taking, Disp: 120  tablet, Rfl: 1  •  tadalafil (Cialis) 20 MG tablet, Take 1 tablet by mouth Daily As Needed for Erectile Dysfunction. (Patient taking differently: Take 1 tablet by mouth.), Disp: 30 tablet, Rfl: 1  •  tamsulosin (FLOMAX) 0.4 MG capsule 24 hr capsule, TAKE 1 CAPSULE BY MOUTH DAILY, Disp: 90 capsule, Rfl: 3  •  warfarin (COUMADIN) 5 MG tablet, TAKE 0.5 TABLET BY MOUTH ON SUNDAYS, TUESDAYS AND THURSDAYS AND TAKE 1 TABLET BY MOUTH ON ALL OTHER DAYS, Disp: 90 tablet, Rfl: 0    Allergies   Allergen Reactions   • Other Rash     Horse Serum       Family History   Problem Relation Age of Onset   • Cancer Mother    • Lung cancer Mother         smoker   • Early death Mother         Lung cancer   • Miscarriages / Stillbirths Mother    • Hyperlipidemia Father    • Anuerysm Father         AAA- smoker   • Early death Father         AAA   • Heart failure Sister         Hypoplastic right coronary artery   • Anuerysm Paternal Uncle         AAA   • Heart attack Daughter    • Early death Daughter         Hypoplastic coronary artery   • Early death Son         Auto collision   • Hyperlipidemia Other    • Aneurysm Other         AAA   • Hyperlipidemia Other    • Stroke Other    • Coronary artery disease Other    • Colon cancer Neg Hx    • Colon polyps Neg Hx    • Crohn's disease Neg Hx    • Irritable bowel syndrome Neg Hx    • Ulcerative colitis Neg Hx        Past Surgical History:   Procedure Laterality Date   • ADENOIDECTOMY  1939   • CARDIOVERSION     • CATARACT EXTRACTION     • COLONOSCOPY  2017   • ENDOSCOPY N/A 11/29/2024    Procedure: ESOPHAGOGASTRODUODENOSCOPY with cold forcep biopsies, dilation of schatzki's ring;  Surgeon: Zev Farmer MD;  Location: [facility] ENDOSCOPY;  Service: Gastroenterology;  Laterality: N/A;  pre: GERD, dysphagia,   post:schatzki's ring,  esophageal ulcer, antrum gastritis, small hiatal hernia   • HERNIA REPAIR Right 1967, 2013    R inguinal   • KNEE SURGERY Right 1985    R meniscus repair   •  MASTOIDECTOMY  1942   • MOHS SURGERY     • TONSILLECTOMY     • UPPER GASTROINTESTINAL ENDOSCOPY     • VASECTOMY  1970       Past Medical History:   Diagnosis Date   • Abnormal ECG 1984   • Anesthesia complication     pt stated he had cardiac arrest during knee surgery   • Atrial fibrillation    • Atrial flutter     S/P CARDIOVERSION   • BPH without urinary obstruction    • Cataract 2007   • DDD (degenerative disc disease), lumbar    • ED (erectile dysfunction) of organic origin    • Erectile dysfunction 2009   • GERD (gastroesophageal reflux disease)    • H/O tachycardia-bradycardia syndrome    • Hypertension 2023   • Left ventricular hypertrophy    • Memory loss    • Osteoarthritis    • Prostate cancer 2010    radioactive seeds placed, in remission   • RBBB (right bundle branch block)    • Skin cancer     H/O SKIN CANCER   • Spinal stenosis, lumbar    • Tachycardia     From Long term Covid       Family History   Problem Relation Age of Onset   • Cancer Mother    • Lung cancer Mother         smoker   • Early death Mother         Lung cancer   • Miscarriages / Stillbirths Mother    • Hyperlipidemia Father    • Anuerysm Father         AAA- smoker   • Early death Father         AAA   • Heart failure Sister         Hypoplastic right coronary artery   • Anuerysm Paternal Uncle         AAA   • Heart attack Daughter    • Early death Daughter         Hypoplastic coronary artery   • Early death Son         Auto collision   • Hyperlipidemia Other    • Aneurysm Other         AAA   • Hyperlipidemia Other    • Stroke Other    • Coronary artery disease Other    • Colon cancer Neg Hx    • Colon polyps Neg Hx    • Crohn's disease Neg Hx    • Irritable bowel syndrome Neg Hx    • Ulcerative colitis Neg Hx        Social History     Socioeconomic History   • Marital status:    Tobacco Use   • Smoking status: Never     Passive exposure: Never   • Smokeless tobacco: Never   Vaping Use   • Vaping status: Never Used   Substance and  Sexual Activity   • Alcohol use: Yes     Alcohol/week: 6.0 standard drinks of alcohol     Types: 6 Shots of liquor per week     Comment: 5-6 times per week   • Drug use: Never   • Sexual activity: Yes     Partners: Female     Birth control/protection: Post-menopausal, Vasectomy           ECG 12 Lead    Date/Time: 1/15/2025 12:16 PM  Performed by: Calvin Anderson MD    Authorized by: Calvin Anderson MD  Comparison: compared with previous ECG   Similar to previous ECG  Comparison to previous ECG: Atrial flutter with controlled ventricular response 68/min nonspecific ST-T wave abnormalities normal axis no ectopy.  Right bundle branch block.  No significant change from previous EKG.        Objective:       Physical Exam    There were no vitals taken for this visit.  The patient is alert, oriented and in no distress.    Vital signs as noted above.    Head and neck revealed no carotid bruits or jugular venous distension.  No thyromegaly or lymphadenopathy is present.    Lungs clear.  No wheezing.  Breath sounds are normal bilaterally.    Heart normal first and second heart sounds.  No murmur..  No pericardial rub is present.  No gallop is present.    Abdomen soft and nontender.  No organomegaly is present.    Extremities revealed good peripheral pulses without any pedal edema.    Skin warm and dry.    Musculoskeletal system is grossly normal.    CNS grossly normal.    Reviewed and updated.

## 2025-01-15 ENCOUNTER — ANTICOAGULATION VISIT (OUTPATIENT)
Dept: CARDIOLOGY | Facility: CLINIC | Age: 89
End: 2025-01-15
Payer: MEDICARE

## 2025-01-15 ENCOUNTER — OFFICE VISIT (OUTPATIENT)
Dept: CARDIOLOGY | Facility: CLINIC | Age: 89
End: 2025-01-15
Payer: MEDICARE

## 2025-01-15 VITALS
WEIGHT: 134 LBS | HEART RATE: 76 BPM | BODY MASS INDEX: 22.33 KG/M2 | DIASTOLIC BLOOD PRESSURE: 89 MMHG | HEIGHT: 65 IN | SYSTOLIC BLOOD PRESSURE: 149 MMHG

## 2025-01-15 VITALS
DIASTOLIC BLOOD PRESSURE: 96 MMHG | SYSTOLIC BLOOD PRESSURE: 143 MMHG | BODY MASS INDEX: 22.3 KG/M2 | WEIGHT: 134 LBS | HEART RATE: 76 BPM

## 2025-01-15 DIAGNOSIS — I48.92 ATRIAL FLUTTER, UNSPECIFIED TYPE: Primary | ICD-10-CM

## 2025-01-15 DIAGNOSIS — I10 ESSENTIAL HYPERTENSION: ICD-10-CM

## 2025-01-15 DIAGNOSIS — Z79.01 LONG TERM (CURRENT) USE OF ANTICOAGULANTS: ICD-10-CM

## 2025-01-15 DIAGNOSIS — R00.2 PALPITATIONS: ICD-10-CM

## 2025-01-15 DIAGNOSIS — I49.5 TACHYCARDIA-BRADYCARDIA: ICD-10-CM

## 2025-01-15 DIAGNOSIS — R00.1 BRADYCARDIA, SINUS: ICD-10-CM

## 2025-01-15 DIAGNOSIS — I48.92 ATRIAL FLUTTER WITH CONTROLLED RESPONSE: ICD-10-CM

## 2025-01-15 LAB — INR PPP: 2.4 (ref 0.9–1.1)

## 2025-01-15 PROCEDURE — 36416 COLLJ CAPILLARY BLOOD SPEC: CPT | Performed by: INTERNAL MEDICINE

## 2025-01-15 PROCEDURE — 85610 PROTHROMBIN TIME: CPT | Performed by: INTERNAL MEDICINE

## 2025-02-18 ENCOUNTER — ANTICOAGULATION VISIT (OUTPATIENT)
Dept: CARDIOLOGY | Facility: CLINIC | Age: 89
End: 2025-02-18
Payer: MEDICARE

## 2025-02-18 VITALS
WEIGHT: 137 LBS | SYSTOLIC BLOOD PRESSURE: 132 MMHG | HEART RATE: 74 BPM | BODY MASS INDEX: 22.8 KG/M2 | DIASTOLIC BLOOD PRESSURE: 88 MMHG

## 2025-02-18 DIAGNOSIS — I48.92 ATRIAL FLUTTER, UNSPECIFIED TYPE: Primary | ICD-10-CM

## 2025-02-18 DIAGNOSIS — Z79.01 LONG TERM (CURRENT) USE OF ANTICOAGULANTS: ICD-10-CM

## 2025-02-18 LAB — INR PPP: 2.3 (ref 0.9–1.1)

## 2025-02-18 PROCEDURE — 36416 COLLJ CAPILLARY BLOOD SPEC: CPT | Performed by: INTERNAL MEDICINE

## 2025-02-18 PROCEDURE — 85610 PROTHROMBIN TIME: CPT | Performed by: INTERNAL MEDICINE

## 2025-02-24 RX ORDER — SUCRALFATE 1 G/1
TABLET ORAL
Qty: 240 TABLET | Refills: 3 | Status: SHIPPED | OUTPATIENT
Start: 2025-02-24

## 2025-03-06 RX ORDER — PANTOPRAZOLE SODIUM 40 MG/1
40 TABLET, DELAYED RELEASE ORAL 2 TIMES DAILY
Qty: 180 TABLET | Refills: 3 | Status: SHIPPED | OUTPATIENT
Start: 2025-03-06

## 2025-03-11 ENCOUNTER — TELEPHONE (OUTPATIENT)
Dept: CARDIOLOGY | Facility: CLINIC | Age: 89
End: 2025-03-11
Payer: MEDICARE

## 2025-03-11 DIAGNOSIS — Z79.01 LONG TERM (CURRENT) USE OF ANTICOAGULANTS: ICD-10-CM

## 2025-03-11 DIAGNOSIS — I48.92 ATRIAL FLUTTER, UNSPECIFIED TYPE: ICD-10-CM

## 2025-03-11 RX ORDER — WARFARIN SODIUM 5 MG/1
TABLET ORAL
Qty: 90 TABLET | Refills: 0 | Status: SHIPPED | OUTPATIENT
Start: 2025-03-11

## 2025-03-11 NOTE — TELEPHONE ENCOUNTER
Will send Rx to [id number] Refill Note  Requested Prescriptions     Pending Prescriptions Disp Refills    warfarin (COUMADIN) 5 MG tablet 90 tablet 0     Sig: TAKE 0.5 TABLET BY MOUTH ON SUNDAYS, TUESDAYS AND THURSDAYS AND TAKE 1 TABLET BY MOUTH ON ALL OTHER DAYS    Last INR 2/18/25  Last office visit with prescribing clinician: 1/15/2025   Last telemedicine visit with prescribing clinician: Visit date not found   Next office visit with prescribing clinician: 7/15/2025                         Would you like a call back once the refill request has been completed: [] Yes [] No    If the office needs to give you a call back, can they leave a voicemail: [] Yes [] No    Bev Manriquez RN  03/11/25, 16:36 EDT

## 2025-03-25 ENCOUNTER — ANTICOAGULATION VISIT (OUTPATIENT)
Dept: CARDIOLOGY | Facility: CLINIC | Age: 89
End: 2025-03-25
Payer: MEDICARE

## 2025-03-25 VITALS
DIASTOLIC BLOOD PRESSURE: 83 MMHG | HEART RATE: 69 BPM | SYSTOLIC BLOOD PRESSURE: 180 MMHG | BODY MASS INDEX: 22.3 KG/M2 | WEIGHT: 134 LBS

## 2025-03-25 DIAGNOSIS — Z79.01 LONG TERM (CURRENT) USE OF ANTICOAGULANTS: ICD-10-CM

## 2025-03-25 DIAGNOSIS — I48.92 ATRIAL FLUTTER, UNSPECIFIED TYPE: Primary | ICD-10-CM

## 2025-03-25 LAB — INR PPP: 2.2 (ref 0.9–1.1)

## 2025-03-25 PROCEDURE — 36416 COLLJ CAPILLARY BLOOD SPEC: CPT | Performed by: INTERNAL MEDICINE

## 2025-03-25 PROCEDURE — 85610 PROTHROMBIN TIME: CPT | Performed by: INTERNAL MEDICINE

## 2025-04-29 ENCOUNTER — ANTICOAGULATION VISIT (OUTPATIENT)
Dept: CARDIOLOGY | Facility: CLINIC | Age: 89
End: 2025-04-29
Payer: MEDICARE

## 2025-04-29 VITALS
BODY MASS INDEX: 22.47 KG/M2 | DIASTOLIC BLOOD PRESSURE: 92 MMHG | WEIGHT: 135 LBS | HEART RATE: 69 BPM | SYSTOLIC BLOOD PRESSURE: 120 MMHG

## 2025-04-29 DIAGNOSIS — Z79.01 LONG TERM (CURRENT) USE OF ANTICOAGULANTS: ICD-10-CM

## 2025-04-29 DIAGNOSIS — I48.92 ATRIAL FLUTTER, UNSPECIFIED TYPE: Primary | ICD-10-CM

## 2025-04-29 LAB — INR PPP: 1.7 (ref 0.9–1.1)

## 2025-04-29 PROCEDURE — 85610 PROTHROMBIN TIME: CPT | Performed by: INTERNAL MEDICINE

## 2025-04-29 PROCEDURE — 36416 COLLJ CAPILLARY BLOOD SPEC: CPT | Performed by: INTERNAL MEDICINE

## 2025-04-29 NOTE — PROGRESS NOTES
Patient's INR- 1.7, outside of therapeutic range. This Sn instructed patient to take 5mg today and then resume current dosage. Recheck in 1 month. dvLPN

## 2025-04-30 DIAGNOSIS — Z79.01 LONG TERM (CURRENT) USE OF ANTICOAGULANTS: ICD-10-CM

## 2025-04-30 DIAGNOSIS — I48.92 ATRIAL FLUTTER, UNSPECIFIED TYPE: ICD-10-CM

## 2025-04-30 RX ORDER — WARFARIN SODIUM 5 MG/1
TABLET ORAL
Qty: 90 TABLET | Refills: 0 | Status: SHIPPED | OUTPATIENT
Start: 2025-04-30

## 2025-04-30 NOTE — TELEPHONE ENCOUNTER
Warfarin 5mg,  TAKE 0.5 TABLET BY MOUTH ON SUNDAYS, TUESDAYS AND THURSDAYS AND TAKE 1 TABLET BY MOUTH ON ALL OTHER DAYS. Script sent to [facility] for refill. dvLPN

## 2025-05-22 ENCOUNTER — OFFICE VISIT (OUTPATIENT)
Dept: FAMILY MEDICINE CLINIC | Facility: CLINIC | Age: 89
End: 2025-05-22
Payer: MEDICARE

## 2025-05-22 ENCOUNTER — LAB (OUTPATIENT)
Dept: FAMILY MEDICINE CLINIC | Facility: CLINIC | Age: 89
End: 2025-05-22
Payer: MEDICARE

## 2025-05-22 VITALS
RESPIRATION RATE: 16 BRPM | BODY MASS INDEX: 22.79 KG/M2 | DIASTOLIC BLOOD PRESSURE: 101 MMHG | HEART RATE: 83 BPM | HEIGHT: 65 IN | SYSTOLIC BLOOD PRESSURE: 146 MMHG | WEIGHT: 136.8 LBS | OXYGEN SATURATION: 97 %

## 2025-05-22 DIAGNOSIS — I10 HYPERTENSION, UNSPECIFIED TYPE: ICD-10-CM

## 2025-05-22 DIAGNOSIS — F52.32 ANORGASMIA OF MALE: ICD-10-CM

## 2025-05-22 DIAGNOSIS — Z00.00 MEDICARE ANNUAL WELLNESS VISIT, SUBSEQUENT: Primary | ICD-10-CM

## 2025-05-22 DIAGNOSIS — H61.21 IMPACTED CERUMEN OF RIGHT EAR: ICD-10-CM

## 2025-05-22 DIAGNOSIS — R35.1 NOCTURIA: ICD-10-CM

## 2025-05-22 DIAGNOSIS — N52.9 ED (ERECTILE DYSFUNCTION) OF ORGANIC ORIGIN: ICD-10-CM

## 2025-05-22 DIAGNOSIS — C61 PROSTATE CANCER: ICD-10-CM

## 2025-05-22 DIAGNOSIS — Z23 ENCOUNTER FOR IMMUNIZATION: ICD-10-CM

## 2025-05-22 DIAGNOSIS — I48.92 ATRIAL FLUTTER, UNSPECIFIED TYPE: ICD-10-CM

## 2025-05-22 DIAGNOSIS — M51.369 DEGENERATION OF INTERVERTEBRAL DISC OF LUMBAR REGION, UNSPECIFIED WHETHER PAIN PRESENT: ICD-10-CM

## 2025-05-22 DIAGNOSIS — Z12.5 ENCOUNTER FOR SCREENING FOR MALIGNANT NEOPLASM OF PROSTATE: ICD-10-CM

## 2025-05-22 DIAGNOSIS — E55.9 VITAMIN D DEFICIENCY, UNSPECIFIED: ICD-10-CM

## 2025-05-22 DIAGNOSIS — K21.9 GASTROESOPHAGEAL REFLUX DISEASE, UNSPECIFIED WHETHER ESOPHAGITIS PRESENT: ICD-10-CM

## 2025-05-22 PROCEDURE — 36415 COLL VENOUS BLD VENIPUNCTURE: CPT | Performed by: FAMILY MEDICINE

## 2025-05-22 PROCEDURE — 82306 VITAMIN D 25 HYDROXY: CPT | Performed by: FAMILY MEDICINE

## 2025-05-22 PROCEDURE — 82607 VITAMIN B-12: CPT | Performed by: FAMILY MEDICINE

## 2025-05-22 PROCEDURE — 80053 COMPREHEN METABOLIC PANEL: CPT | Performed by: FAMILY MEDICINE

## 2025-05-22 PROCEDURE — 84439 ASSAY OF FREE THYROXINE: CPT | Performed by: FAMILY MEDICINE

## 2025-05-22 PROCEDURE — 90677 PCV20 VACCINE IM: CPT | Performed by: FAMILY MEDICINE

## 2025-05-22 PROCEDURE — 69210 REMOVE IMPACTED EAR WAX UNI: CPT | Performed by: FAMILY MEDICINE

## 2025-05-22 PROCEDURE — 1126F AMNT PAIN NOTED NONE PRSNT: CPT | Performed by: FAMILY MEDICINE

## 2025-05-22 PROCEDURE — 84443 ASSAY THYROID STIM HORMONE: CPT | Performed by: FAMILY MEDICINE

## 2025-05-22 PROCEDURE — G0009 ADMIN PNEUMOCOCCAL VACCINE: HCPCS | Performed by: FAMILY MEDICINE

## 2025-05-22 PROCEDURE — 85025 COMPLETE CBC W/AUTO DIFF WBC: CPT | Performed by: FAMILY MEDICINE

## 2025-05-22 PROCEDURE — G0439 PPPS, SUBSEQ VISIT: HCPCS | Performed by: FAMILY MEDICINE

## 2025-05-22 PROCEDURE — 83036 HEMOGLOBIN GLYCOSYLATED A1C: CPT | Performed by: FAMILY MEDICINE

## 2025-05-22 PROCEDURE — G0103 PSA SCREENING: HCPCS | Performed by: FAMILY MEDICINE

## 2025-05-22 PROCEDURE — 1159F MED LIST DOCD IN RCRD: CPT | Performed by: FAMILY MEDICINE

## 2025-05-22 PROCEDURE — 80061 LIPID PANEL: CPT | Performed by: FAMILY MEDICINE

## 2025-05-22 PROCEDURE — 99214 OFFICE O/P EST MOD 30 MIN: CPT | Performed by: FAMILY MEDICINE

## 2025-05-22 PROCEDURE — 1160F RVW MEDS BY RX/DR IN RCRD: CPT | Performed by: FAMILY MEDICINE

## 2025-05-22 RX ORDER — AMLODIPINE BESYLATE 2.5 MG/1
2.5 TABLET ORAL DAILY
Qty: 90 TABLET | Refills: 1 | Status: SHIPPED | OUTPATIENT
Start: 2025-05-22

## 2025-05-22 NOTE — PROGRESS NOTES
Subjective   The ABCs of the Annual Wellness Visit  Medicare Wellness Visit    Henok Beasley is a 89 y.o. patient who presents for a Medicare Wellness Visit.    The following portions of the patient's history were reviewed and   updated as appropriate: allergies, current medications, past family history, past medical history, past social history, past surgical history, and problem list.    Compared to one year ago, the patient's physical   health is the same.  Compared to one year ago, the patient's mental   health is the same.    Recent Hospitalizations:  He was not admitted to the hospital during the last year.     Current Medical Providers:  Patient Care Team:  Ole Romo MD as PCP - General (Internal Medicine)  Calvin Anderson MD as Consulting Physician (Cardiology)  Husam Monroy APRN as Nurse Practitioner (Gastroenterology)    Outpatient Medications Prior to Visit   Medication Sig Dispense Refill    Ginkgo Biloba 120 MG tablet GINKOBA TABS      Glucosamine-Chondroitin 250-200 MG tablet [facility] GLUCOSAMINE-CHONDROITIN TABS      metoprolol tartrate (LOPRESSOR) 25 MG tablet TAKE 1/2 TABLET BY MOUTH TWICE DAILY (Patient taking differently: Take 0.5 tablets by mouth Daily. Patient taking 1/2 daily) 90 tablet 3    MULTIPLE VITAMIN-FOLIC ACID PO MULTIVITAMINS TABS      pantoprazole (PROTONIX) 40 MG EC tablet TAKE 1 TABLET BY MOUTH TWICE  DAILY 180 tablet 3    sucralfate (CARAFATE) 1 g tablet TAKE 1 TABLET BY MOUTH 3 TIMES  DAILY BEFORE MEALS . ENSURE NO  OTHER MEDICATIONS WITHIN 1 HOUR  OF DOSE OR 2 TO 4 HOURS AFTER  TAKING 240 tablet 3    tadalafil (Cialis) 20 MG tablet Take 1 tablet by mouth Daily As Needed for Erectile Dysfunction. (Patient taking differently: Take 1 tablet by mouth.) 30 tablet 1    tamsulosin (FLOMAX) 0.4 MG capsule 24 hr capsule TAKE 1 CAPSULE BY MOUTH DAILY 90 capsule 3    warfarin (COUMADIN) 5 MG tablet TAKE 0.5 TABLET BY MOUTH ON SUNDAYS, TUESDAYS AND THURSDAYS AND TAKE 1 TABLET  "BY MOUTH ON ALL OTHER DAYS 90 tablet 0    Ascorbic Acid (VITAMIN C ER) 1000 MG tablet controlled-release VITAMIN C ER 1000 MG ORAL TABLET EXTENDED RELEASE (Patient not taking: Reported on 5/22/2025)       No facility-administered medications prior to visit.     No opioid medication identified on active medication list. I have reviewed chart for other potential  high risk medication/s and harmful drug interactions in the elderly.      Aspirin is not on active medication list.  Aspirin use is not indicated based on review of current medical condition/s. Risk of harm outweighs potential benefits.  .    Patient Active Problem List   Diagnosis    Atrial flutter    Degeneration of intervertebral disc of lumbar region    Enlarged prostate without lower urinary tract symptoms (luts)    GERD (gastroesophageal reflux disease)    History of hematuria    History of malignant neoplasm of prostate    History of malignant neoplasm of skin    Hypertension    Impotence of organic origin    Left ventricular hypertrophy    Memory loss    Osteoarthritis    RBBB    Sick sinus syndrome    Spinal stenosis, lumbar    Syncope    Prostate cancer    ED (erectile dysfunction) of organic origin    DDD (degenerative disc disease), lumbar    BPH without urinary obstruction    Bilateral pseudophakia    Vitreous opacities    Long term (current) use of anticoagulants    Dysphagia    Chronic anticoagulation    Odynophagia    History of Molina's esophagus     Advance Care Planning Advance Directive is on file.  ACP discussion was held with the patient during this visit. Patient has an advance directive in EMR which is still valid.       Objective   Vitals:    05/22/25 1334   BP: (!) 146/101   Pulse: 83   Resp: 16   SpO2: 97%   Weight: 62.1 kg (136 lb 12.8 oz)   Height: 165.1 cm (65\")   PainSc: 0-No pain       Estimated body mass index is 22.76 kg/m² as calculated from the following:    Height as of this encounter: 165.1 cm (65\").    Weight as of this " encounter: 62.1 kg (136 lb 12.8 oz).    BMI is within normal parameters. No other follow-up for BMI required.    Does the patient have evidence of cognitive impairment? No  Lab Results   Component Value Date    TRIG 118 2025    HDL 47 2025    LDL 88 2025    VLDL 21 2025    HGBA1C 5.80 (H) 2025     Ear Cerumen Removal    Date/Time: 2025 2:15 PM    Performed by: Ole Romo MD  Authorized by: Ole Romo MD  Ceruminolytics applied: Ceruminolytics applied prior to the procedure.  Location details: right ear  Patient tolerance: patient tolerated the procedure well with no immediate complications  Procedure type: instrumentation, curette                                                                                            Health  Risk Assessment    Smoking Status:  Social History     Tobacco Use   Smoking Status Never    Passive exposure: Never   Smokeless Tobacco Never     Alcohol Consumption:  Social History     Substance and Sexual Activity   Alcohol Use Yes    Alcohol/week: 6.0 standard drinks of alcohol    Types: 6 Shots of liquor per week    Comment: 5-6 times per week   1oz Kaluha and 5oz Ensure    Fall Risk Screen  STEADI Fall Risk Assessment was completed, and patient is at LOW risk for falls.Assessment completed on:2025    Depression Screening   Little interest or pleasure in doing things? Not at all   Feeling down, depressed, or hopeless? Not at all   PHQ-2 Total Score 0      Health Habits and Functional and Cognitive Screenin/17/2025     3:17 PM   Functional & Cognitive Status   Do you have difficulty preparing food and eating? No   Do you have difficulty bathing yourself, getting dressed or grooming yourself? No   Do you have difficulty using the toilet? No   Do you have difficulty moving around from place to place? No   Do you have trouble with steps or getting out of a bed or a chair? No   Current Diet Well Balanced Diet   Dental  Exam Up to date   Eye Exam Up to date   Exercise (times per week) 7 times per week   Current Exercises Include Dancing;Gardening;House Cleaning;Running/Jogging;Swimming;Walking;Yard Work   Do you need help using the phone?  No   Are you deaf or do you have serious difficulty hearing?  No   Do you need help to go to places out of walking distance? No   Do you need help shopping? No   Do you need help preparing meals?  No   Do you need help with housework?  No   Do you need help with laundry? No   Do you need help taking your medications? No   Do you need help managing money? No   Do you ever drive or ride in a car without wearing a seat belt? No   Have you felt unusual stress, anger or loneliness in the last month? No   Who do you live with? Spouse   If you need help, do you have trouble finding someone available to you? No   Have you been bothered in the last four weeks by sexual problems? Yes   Do you have difficulty concentrating, remembering or making decisions? No           Age-appropriate Screening Schedule:  Refer to the list below for future screening recommendations based on patient's age, sex and/or medical conditions. Orders for these recommended tests are listed in the plan section. The patient has been provided with a written plan.    Health Maintenance List  Health Maintenance   Topic Date Due    TDAP/TD VACCINES (1 - Tdap) Never done    COVID-19 Vaccine (8 - 2024-25 season) 04/04/2025    INFLUENZA VACCINE  07/01/2025    ANNUAL WELLNESS VISIT  05/22/2026    RSV Vaccine - Adults  Completed    Pneumococcal Vaccine 50+  Completed    ZOSTER VACCINE  Completed                                                                                                                                              CMS Preventative Services Quick Reference  Risk Factors Identified During Encounter  Immunizations Discussed/Encouraged: Tdap, Influenza, and Prevnar 20 (Pneumococcal 20-valent conjugate)    The above  risks/problems have been discussed with the patient.  Pertinent information has been shared with the patient in the After Visit Summary.  An After Visit Summary and PPPS were made available to the patient.    Preventative:  Colonoscopy: 2016, due 2026. Aged out  PSA: undetectable 4/2024. Dx 2009.  DEXA: deferred per patient preference  Shingles: Completed Zosatavax; Shingrix 3/2022  Pneumonia: Pneumovax 2014, PCV20 ordered today  Tdap: Recommended  RSV: 9/2023  Influenza: 10/2024, recommended  COVID: Completed 7 shots Pfizer (10/2024) and illness    Follow Up:   Next Medicare Wellness visit to be scheduled in 1 year.     Additional E&M Note during same encounter follows:  Patient has additional, significant, and separately identifiable condition(s)/problem(s) that require work above and beyond the Medicare Wellness Visit     Chief Complaint  Medicare Wellness-subsequent    Subjective   HPI  Henok is also being seen today for additional medical problem/s.        Prostate cancer: diagnosed 2009, s/p radioactive seed placement in 2010. Last PSA 4/2024 undetectable. No longer following w/ urology or oncology     Atrial flutter: s/p cardioversion in 2006. Patient did have some tachycardia following COVID. He was started back on metoprolol to help improve his mild tachycardia. 3/2023 EKG concerning for a flutter. 1/2025 EKG w/ a flutter as well. He has subsequently continued metoprolol 12.5 daily and started warfarin. Currently taking 5mg 4 days/week and 2.5mg 3 days/week. Managed by Justin. Denies palpitations. Follows w/ CARDS- Justin     HTN: 146/101 today. Diastolic generally runs 85-95 at home. Maintained on metoprolol 12.5mg daily. No LH/dizziness, CP or SOB     GERD: maintained on pantoprazole 40mg daily. Reports reflux/heartburn weekly but only occurs at night when lying down. Well controlled w/ a drink of water. He does report some difficulty swallowing every morning. 11/2024 EGD w/ 1cm hiatal hernia, mild Schatzki  "ring- dilated, and esophageal ulcer- prescribed sucralfate. Has f/u w/ GI next week.     Nocturia: generally getting up every 3 hours at night to urinate. No decrease in stream. Maintained on Flomax 1 pill daily and happy w/ control. Prefers not change.      ED: patient reports using vacuum device along w/ Cialis 20mg daily PRN.     Back pain: well controlled. Does regular stretches/ yoga every morning.     Objective   Vital Signs:  BP (!) 146/101   Pulse 83   Resp 16   Ht 165.1 cm (65\")   Wt 62.1 kg (136 lb 12.8 oz)   SpO2 97%   BMI 22.76 kg/m²   Physical Exam  Constitutional:       General: He is not in acute distress.     Appearance: He is well-developed.   HENT:      Head: Normocephalic and atraumatic.      Right Ear: Tympanic membrane and external ear normal. There is impacted cerumen.      Left Ear: Tympanic membrane and external ear normal. There is no impacted cerumen.      Nose: Nose normal.      Mouth/Throat:      Mouth: Mucous membranes are moist.      Pharynx: No oropharyngeal exudate or posterior oropharyngeal erythema.   Eyes:      General: No scleral icterus.        Right eye: No discharge.         Left eye: No discharge.      Extraocular Movements: Extraocular movements intact.      Conjunctiva/sclera: Conjunctivae normal.      Pupils: Pupils are equal, round, and reactive to light.   Neck:      Thyroid: No thyromegaly.      Vascular: No carotid bruit.   Cardiovascular:      Rate and Rhythm: Normal rate and regular rhythm.      Heart sounds: Normal heart sounds. No murmur heard.  Pulmonary:      Effort: Pulmonary effort is normal. No respiratory distress.      Breath sounds: Normal breath sounds. No wheezing or rales.   Abdominal:      General: Bowel sounds are normal. There is no distension.      Palpations: Abdomen is soft.      Tenderness: There is no abdominal tenderness.   Musculoskeletal:         General: No deformity. Normal range of motion.      Cervical back: Normal range of motion and " neck supple.   Lymphadenopathy:      Cervical: No cervical adenopathy.   Skin:     General: Skin is warm and dry.      Findings: No rash.   Neurological:      General: No focal deficit present.      Mental Status: He is alert and oriented to person, place, and time. Mental status is at baseline.      Cranial Nerves: No cranial nerve deficit.      Sensory: No sensory deficit.   Psychiatric:         Behavior: Behavior normal.         Thought Content: Thought content normal.        Assessment and Plan Additional age appropriate preventative wellness advice topics were discussed during today's preventative wellness exam(some topics already addressed during AWV portion of the note above):    Physical Activity: Advised cardiovascular activity 150 minutes per week as tolerated. (example brisk walk for 30 minutes, 5 days a week).     Medicare annual wellness visit, subsequent  - Counseled regarding exercise and preventative health maintenance items/immunizations below  Orders:    CBC & Differential    Comprehensive Metabolic Panel    Hemoglobin A1c    Lipid Panel    TSH    T4, Free    Vitamin D,25-Hydroxy    Vitamin B12    PSA Screen    Pneumococcal Conjugate Vaccine 20-Valent All    Prostate cancer  diagnosed 2009, s/p radioactive seed placement in 2010. Last PSA 4/2024 undetectable  Orders:    PSA Screen    Ambulatory Referral to Urology  - Pt interested in seeing urology for subsequent ED complaints rather than prostate cancer monitoring/tx  Atrial flutter, unspecified type  s/p cardioversion in 2006. Now w/ multiple EKGs revealing return of afib  - Cont home metoprolol 12.5mg daily and warfarin 5mg 4 days/week and 2.5mg 3 days/week  - Cont CARDS f/u- [place]  Hypertension, unspecified type  146/101 today  - Cont home metoprolol 12.5 daily    Start amLODIPine (NORVASC) 2.5 MG tablet; Take 1 tablet by mouth Daily.  Gastroesophageal reflux disease, unspecified whether esophagitis present  11/2024 EGD w/ 1cm hiatal hernia,  mild Schatzki ring- dilated, and esophageal ulcer  - Cont home pantoprazole 40mg daily  - Plan for GI f/u next week  Nocturia  - Cont home Flomax 0.4mg daily  ED (erectile dysfunction) of organic origin  - Cont vacuum and Cialis 20mg PRN   -- Provides inadequate results and patient desire to discuss additional options w/ urology  Orders:    Ambulatory Referral to Urology    Degeneration of intervertebral disc of lumbar region, unspecified whether pain present  - Cont regular stretching/yoga  Anorgasmia of male  Orders:    Ambulatory Referral to Urology    Impacted cerumen of right ear  Orders:    Ear Cerumen Removal    Vitamin D deficiency, unspecified  Orders:    Vitamin D,25-Hydroxy    Encounter for screening for malignant neoplasm of prostate  Orders:    PSA Screen    Encounter for immunization  Orders:    Pneumococcal Conjugate Vaccine 20-Valent All          Follow Up   Return in about 6 months (around 11/22/2025) for Recheck- 30min.  Patient was given instructions and counseling regarding his condition or for health maintenance advice. Please see specific information pulled into the AVS if appropriate.

## 2025-05-22 NOTE — ASSESSMENT & PLAN NOTE
- Cont vacuum and Cialis 20mg PRN   -- Provides inadequate results and patient desire to discuss additional options w/ urology  Orders:    Ambulatory Referral to Urology

## 2025-05-22 NOTE — ASSESSMENT & PLAN NOTE
146/101 today  - Cont home metoprolol 12.5 daily    Start amLODIPine (NORVASC) 2.5 MG tablet; Take 1 tablet by mouth Daily.

## 2025-05-22 NOTE — ASSESSMENT & PLAN NOTE
diagnosed 2009, s/p radioactive seed placement in 2010. Last PSA 4/2024 undetectable  Orders:    PSA Screen    Ambulatory Referral to Urology  - Pt interested in seeing urology for subsequent ED complaints rather than prostate cancer monitoring/tx

## 2025-05-22 NOTE — ASSESSMENT & PLAN NOTE
11/2024 EGD w/ 1cm hiatal hernia, mild Schatzki ring- dilated, and esophageal ulcer  - Cont home pantoprazole 40mg daily  - Plan for GI f/u next week

## 2025-05-22 NOTE — ASSESSMENT & PLAN NOTE
s/p cardioversion in 2006. Now w/ multiple EKGs revealing return of afib  - Cont home metoprolol 12.5mg daily and warfarin 5mg 4 days/week and 2.5mg 3 days/week  - Cont CARDS f/u- [place]

## 2025-05-23 LAB
25(OH)D3 SERPL-MCNC: 34.1 NG/ML (ref 30–100)
ALBUMIN SERPL-MCNC: 4 G/DL (ref 3.5–5.2)
ALBUMIN/GLOB SERPL: 1.3 G/DL
ALP SERPL-CCNC: 115 U/L (ref 39–117)
ALT SERPL W P-5'-P-CCNC: 36 U/L (ref 1–41)
ANION GAP SERPL CALCULATED.3IONS-SCNC: 6 MMOL/L (ref 5–15)
AST SERPL-CCNC: 29 U/L (ref 1–40)
BASOPHILS # BLD AUTO: 0.04 10*3/MM3 (ref 0–0.2)
BASOPHILS NFR BLD AUTO: 0.7 % (ref 0–1.5)
BILIRUB SERPL-MCNC: 0.9 MG/DL (ref 0–1.2)
BUN SERPL-MCNC: 34 MG/DL (ref 8–23)
BUN/CREAT SERPL: 25.8 (ref 7–25)
CALCIUM SPEC-SCNC: 9.4 MG/DL (ref 8.6–10.5)
CHLORIDE SERPL-SCNC: 101 MMOL/L (ref 98–107)
CHOLEST SERPL-MCNC: 156 MG/DL (ref 0–200)
CO2 SERPL-SCNC: 29 MMOL/L (ref 22–29)
CREAT SERPL-MCNC: 1.32 MG/DL (ref 0.76–1.27)
DEPRECATED RDW RBC AUTO: 42.1 FL (ref 37–54)
EGFRCR SERPLBLD CKD-EPI 2021: 51.9 ML/MIN/1.73
EOSINOPHIL # BLD AUTO: 0.12 10*3/MM3 (ref 0–0.4)
EOSINOPHIL NFR BLD AUTO: 2.2 % (ref 0.3–6.2)
ERYTHROCYTE [DISTWIDTH] IN BLOOD BY AUTOMATED COUNT: 12.1 % (ref 12.3–15.4)
GLOBULIN UR ELPH-MCNC: 3 GM/DL
GLUCOSE SERPL-MCNC: 65 MG/DL (ref 65–99)
HBA1C MFR BLD: 5.8 % (ref 4.8–5.6)
HCT VFR BLD AUTO: 47.7 % (ref 37.5–51)
HDLC SERPL-MCNC: 47 MG/DL (ref 40–60)
HGB BLD-MCNC: 16.5 G/DL (ref 13–17.7)
IMM GRANULOCYTES # BLD AUTO: 0.02 10*3/MM3 (ref 0–0.05)
IMM GRANULOCYTES NFR BLD AUTO: 0.4 % (ref 0–0.5)
LDLC SERPL CALC-MCNC: 88 MG/DL (ref 0–100)
LDLC/HDLC SERPL: 1.82 {RATIO}
LYMPHOCYTES # BLD AUTO: 1.17 10*3/MM3 (ref 0.7–3.1)
LYMPHOCYTES NFR BLD AUTO: 21.5 % (ref 19.6–45.3)
MCH RBC QN AUTO: 32.5 PG (ref 26.6–33)
MCHC RBC AUTO-ENTMCNC: 34.6 G/DL (ref 31.5–35.7)
MCV RBC AUTO: 93.9 FL (ref 79–97)
MONOCYTES # BLD AUTO: 0.69 10*3/MM3 (ref 0.1–0.9)
MONOCYTES NFR BLD AUTO: 12.7 % (ref 5–12)
NEUTROPHILS NFR BLD AUTO: 3.41 10*3/MM3 (ref 1.7–7)
NEUTROPHILS NFR BLD AUTO: 62.5 % (ref 42.7–76)
NRBC BLD AUTO-RTO: 0 /100 WBC (ref 0–0.2)
PLATELET # BLD AUTO: 208 10*3/MM3 (ref 140–450)
PMV BLD AUTO: 9.9 FL (ref 6–12)
POTASSIUM SERPL-SCNC: 5 MMOL/L (ref 3.5–5.2)
PROT SERPL-MCNC: 7 G/DL (ref 6–8.5)
PSA SERPL-MCNC: <0.014 NG/ML (ref 0–4)
RBC # BLD AUTO: 5.08 10*6/MM3 (ref 4.14–5.8)
SODIUM SERPL-SCNC: 136 MMOL/L (ref 136–145)
T4 FREE SERPL-MCNC: 1.25 NG/DL (ref 0.92–1.68)
TRIGL SERPL-MCNC: 118 MG/DL (ref 0–150)
TSH SERPL DL<=0.05 MIU/L-ACNC: 2.28 UIU/ML (ref 0.27–4.2)
VIT B12 BLD-MCNC: 698 PG/ML (ref 211–946)
VLDLC SERPL-MCNC: 21 MG/DL (ref 5–40)
WBC NRBC COR # BLD AUTO: 5.45 10*3/MM3 (ref 3.4–10.8)

## 2025-05-27 ENCOUNTER — ANTICOAGULATION VISIT (OUTPATIENT)
Dept: CARDIOLOGY | Facility: CLINIC | Age: 89
End: 2025-05-27
Payer: MEDICARE

## 2025-05-27 VITALS
WEIGHT: 134 LBS | BODY MASS INDEX: 22.3 KG/M2 | SYSTOLIC BLOOD PRESSURE: 133 MMHG | DIASTOLIC BLOOD PRESSURE: 107 MMHG | HEART RATE: 75 BPM

## 2025-05-27 DIAGNOSIS — Z79.01 LONG TERM (CURRENT) USE OF ANTICOAGULANTS: ICD-10-CM

## 2025-05-27 DIAGNOSIS — I48.92 ATRIAL FLUTTER, UNSPECIFIED TYPE: Primary | ICD-10-CM

## 2025-05-27 LAB — INR PPP: 2 (ref 0.9–1.1)

## 2025-05-27 PROCEDURE — 85610 PROTHROMBIN TIME: CPT | Performed by: INTERNAL MEDICINE

## 2025-05-27 PROCEDURE — 36416 COLLJ CAPILLARY BLOOD SPEC: CPT | Performed by: INTERNAL MEDICINE

## 2025-05-27 NOTE — PROGRESS NOTES
Patient's INR- 2.0 within therapeutic range. Continue current dosage and recheck in 1  month. dvLPN

## 2025-06-17 NOTE — PROGRESS NOTES
Chief Complaint  Heartburn    Subjective        Henok Beasley is an 89-year-old male, presents today for follow-up from previous visit with this NP on 12/19/2024  Office Visit with Husam Monroy APRN (12/19/2024)   (Copied text in this note has been reviewed, modified, and accurate as of 6/17/2025)    - He has a history of vitamin D deficiency, prediabetes with an A1c of 5.8, GERD, and sick sinus syndrome, which causes his heart rate to fluctuate between 40 and 105. 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  Patient is on Coumadin.  He does not frequently use NSAIDs. He has a known history of prostate cancer, treated with brachytherapy in 2009, and had a skin tumor removed from his ear.    Previous visit, for his GERD, dysphagia, hiatal hernia, GERD with ulcerative esophagitis.  Pantoprazole was increased to twice daily for 8 weeks then reduce to daily.  Sucralfate was added    History of Present Illness      Results Reviewed:  Vitamin B12 (05/22/2025 14:56) 698    Vitamin D,25-Hydroxy (05/22/2025 14:56) normal    TSH (05/22/2025 14:56) normal    Lipid Panel (05/22/2025 14:56) normal    Hemoglobin A1c (05/22/2025 14:56) 5.80    Comprehensive Metabolic Panel (05/22/2025 14:56) normal LFT, ALK    CBC & Differential (05/22/2025 14:56) hemoglobin 16.5  ----------------------------------------------------------------------  Upper GI Endoscopy (11/29/2024 09:17)   - 1 cm hiatal hernia.   - Mild Schatzki ring. Dilated.   - Esophageal ulcer with no stigmata of recent bleeding. Biopsied.   - Z-line irregular, at the gastroesophageal junction. Biopsied.   - Gastritis, characterized by erythema. Biopsied.   - Normal examined duodenum    Tissue Pathology Exam (11/29/2024 09:33)   -Extensive lymphocytic infiltrate and focal acute inflammation of the esophageal squamous mucosa.  -No intestinal metaplasia  -Ulceration and acute inflammation of the esophagus  - Recommend PPI due to  "ulceration and inflammation in the distal esophagus above.  Viral stains for fungal and virus are negative.    Interval history related to EGD/CLS in 2017:  EGD 9/6/2017 for history of Molina's esophagus, performed by Dr. Lena Gonsalez. The postoperative diagnosis was gastritis. The upper endoscopy findings suggested a normal esophagus, with no hiatal hernia or inflammation noted at the GE junction. There was mild distal gastritis, but the duodenum was within normal limits. A biopsy was taken for H. pylori.     The EGD pathology result of the upper endoscopy showed negative for intestinal metaplasia, benign gastric mucosa with lamina propria congestion, and no H. pylori.     He also had a colonoscopy at the same time, with a postoperative diagnosis of colitis and diverticulosis. The colonoscopy revealed mild colitis in the sigmoid colon and diverticulosis without evidence of diverticulitis. There was no evidence of polyps, and the prep was adequate.     The colonoscopy pathology showed benign colonic mucosa with mild focal nonspecific colitis, negative for microscopic colitis and inflammatory bowel disease. This report was dictated on 09/08/2017 from [facility] by Dr. Lena Gonsalez.    SOCIAL HISTORY  He drinks little alcohol everyday for years. He drinks either a little red wine or a whiskey-based cocktail only one about 6 times a week. He does not smoke.    FAMILY HISTORY  He denies any family history of colon cancer or esophagus cancer.     Objective   Vital Signs:  /68   Pulse 74   Temp 97.7 °F (36.5 °C)   Ht 165.1 cm (65\")   Wt 61 kg (134 lb 8 oz)   SpO2 97%   BMI 22.38 kg/m²   Estimated body mass index is 22.38 kg/m² as calculated from the following:    Height as of this encounter: 165.1 cm (65\").    Weight as of this encounter: 61 kg (134 lb 8 oz).       BMI is within normal parameters. No other follow-up for BMI required.      Physical Exam  Vitals and nursing note reviewed. "   Constitutional:       General: He is not in acute distress.     Appearance: Normal appearance. He is normal weight. He is not ill-appearing, toxic-appearing or diaphoretic.   Eyes:      General: No scleral icterus.     Conjunctiva/sclera: Conjunctivae normal.   Pulmonary:      Effort: Pulmonary effort is normal.   Abdominal:      General: Abdomen is flat. Bowel sounds are normal. There is no distension.      Palpations: Abdomen is soft. There is no mass.      Tenderness: There is no abdominal tenderness. There is no right CVA tenderness, left CVA tenderness, guarding or rebound.      Hernia: No hernia is present.   Skin:     Coloration: Skin is not jaundiced or pale.      Findings: No erythema or rash.   Neurological:      Mental Status: He is alert and oriented to person, place, and time. Mental status is at baseline.   Psychiatric:         Mood and Affect: Mood normal.         Thought Content: Thought content normal.         Judgment: Judgment normal.             Assessment and Plan     Diagnoses and all orders for this visit:    1. Dysphagia, unspecified type (Primary)    2. Schatzki ring of distal esophagus    3. Ulcerative esophagitis    4. Hiatal hernia    5. Gastroesophageal reflux disease without esophagitis    6. Chronic anticoagulation      Assessment & Plan      There are no Patient Instructions on file for this visit.     I have reviewed patient's current medications list, relevant clinical information necessary for today's encounter. I discussed the clinical impression and treatment plan with the patient, who verbalized understanding and in agreement.  All questions were answered and support was provided.    Patient or patient representative verbalized consent for the use of Ambient Listening during the visit with  RAJAN Moreland for chart documentation. 6/19/2025  15:22 EST

## 2025-06-19 ENCOUNTER — OFFICE VISIT (OUTPATIENT)
Dept: GASTROENTEROLOGY | Facility: CLINIC | Age: 89
End: 2025-06-19
Payer: MEDICARE

## 2025-06-19 VITALS
HEART RATE: 74 BPM | SYSTOLIC BLOOD PRESSURE: 128 MMHG | BODY MASS INDEX: 22.41 KG/M2 | WEIGHT: 134.5 LBS | TEMPERATURE: 97.7 F | OXYGEN SATURATION: 97 % | HEIGHT: 65 IN | DIASTOLIC BLOOD PRESSURE: 68 MMHG

## 2025-06-19 DIAGNOSIS — R13.10 DYSPHAGIA, UNSPECIFIED TYPE: Primary | ICD-10-CM

## 2025-06-19 DIAGNOSIS — Z79.01 CHRONIC ANTICOAGULATION: ICD-10-CM

## 2025-06-19 DIAGNOSIS — K44.9 HIATAL HERNIA: ICD-10-CM

## 2025-06-19 DIAGNOSIS — K21.9 GASTROESOPHAGEAL REFLUX DISEASE WITHOUT ESOPHAGITIS: ICD-10-CM

## 2025-06-19 DIAGNOSIS — K22.2 SCHATZKI RING OF DISTAL ESOPHAGUS: ICD-10-CM

## 2025-06-19 DIAGNOSIS — K22.10 ULCERATIVE ESOPHAGITIS: ICD-10-CM

## 2025-06-19 RX ORDER — PANTOPRAZOLE SODIUM 40 MG/1
40 TABLET, DELAYED RELEASE ORAL 2 TIMES DAILY
Qty: 180 TABLET | Refills: 3 | Status: SHIPPED | OUTPATIENT
Start: 2025-06-19

## 2025-07-07 ENCOUNTER — TELEPHONE (OUTPATIENT)
Dept: CARDIOLOGY | Facility: CLINIC | Age: 89
End: 2025-07-07
Payer: MEDICARE

## 2025-07-07 DIAGNOSIS — Z79.01 LONG TERM (CURRENT) USE OF ANTICOAGULANTS: ICD-10-CM

## 2025-07-07 DIAGNOSIS — I48.92 ATRIAL FLUTTER, UNSPECIFIED TYPE: ICD-10-CM

## 2025-07-07 RX ORDER — WARFARIN SODIUM 5 MG/1
TABLET ORAL
Qty: 90 TABLET | Refills: 2 | Status: SHIPPED | OUTPATIENT
Start: 2025-07-07

## 2025-07-07 NOTE — TELEPHONE ENCOUNTER
Warfarin 5mg,  TAKE 0.5 TABLET BY MOUTH ON SUNDAYS, TUESDAYS AND THURSDAYS AND TAKE 1 TABLET BY MOUTH ON ALL OTHER DAYS. Script sent t [facility] [place] for refill. dvLPN

## 2025-07-07 NOTE — TELEPHONE ENCOUNTER
Incoming Refill Request      Medication requested (name and dose): WARFARIN 5  MG    [facility]arm[facility] where [facility] be [id number] [place], [place]     Additional details provided by patient:     Best call back number: [phone]    Does the patient have less than a 3 day supply:  [] Yes  [x] No    Sandro Hollins Rep  07/07/25, 09:39 EDT

## 2025-07-08 RX ORDER — METOPROLOL TARTRATE 25 MG/1
12.5 TABLET, FILM COATED ORAL DAILY
Qty: 45 TABLET | Refills: 2 | Status: SHIPPED | OUTPATIENT
Start: 2025-07-08

## 2025-07-08 NOTE — TELEPHONE ENCOUNTER
Rx Refill Note  Requested Prescriptions     Signed Prescriptions Disp Refills    metoprolol tartrate (LOPRESSOR) 25 MG tablet 45 tablet 2     Sig: Take 0.5 tablets by mouth Daily.     Authorizing Provider: MILEY POLK     Ordering User: RACHAEL COLE      Last office visit with prescribing clinician: 1/15/2025   Last telemedicine visit with prescribing clinician: Visit date not found   Next office visit with prescribing clinician: 7/15/2025                         Would you like a call back once the refill request has been completed: [] Yes [] No    If the office needs to give you a call back, can they leave a voicemail: [] Yes [] No    Rachael Cole MA  07/08/25, 15:42 EDT

## 2025-07-15 ENCOUNTER — OFFICE VISIT (OUTPATIENT)
Dept: CARDIOLOGY | Facility: CLINIC | Age: 89
End: 2025-07-15
Payer: MEDICARE

## 2025-07-15 ENCOUNTER — ANTICOAGULATION VISIT (OUTPATIENT)
Dept: CARDIOLOGY | Facility: CLINIC | Age: 89
End: 2025-07-15
Payer: MEDICARE

## 2025-07-15 VITALS
OXYGEN SATURATION: 100 % | DIASTOLIC BLOOD PRESSURE: 98 MMHG | BODY MASS INDEX: 22.16 KG/M2 | HEART RATE: 59 BPM | WEIGHT: 133 LBS | SYSTOLIC BLOOD PRESSURE: 126 MMHG | HEIGHT: 65 IN

## 2025-07-15 VITALS
DIASTOLIC BLOOD PRESSURE: 98 MMHG | WEIGHT: 133 LBS | HEART RATE: 59 BPM | SYSTOLIC BLOOD PRESSURE: 126 MMHG | BODY MASS INDEX: 22.13 KG/M2 | OXYGEN SATURATION: 100 %

## 2025-07-15 DIAGNOSIS — R00.1 BRADYCARDIA, SINUS: ICD-10-CM

## 2025-07-15 DIAGNOSIS — R00.2 PALPITATIONS: ICD-10-CM

## 2025-07-15 DIAGNOSIS — I48.92 ATRIAL FLUTTER, UNSPECIFIED TYPE: ICD-10-CM

## 2025-07-15 DIAGNOSIS — Z79.01 LONG TERM (CURRENT) USE OF ANTICOAGULANTS: Primary | ICD-10-CM

## 2025-07-15 DIAGNOSIS — I48.92 ATRIAL FLUTTER WITH CONTROLLED RESPONSE: ICD-10-CM

## 2025-07-15 DIAGNOSIS — I49.5 TACHYCARDIA-BRADYCARDIA: ICD-10-CM

## 2025-07-15 DIAGNOSIS — Z79.01 LONG TERM (CURRENT) USE OF ANTICOAGULANTS: ICD-10-CM

## 2025-07-15 DIAGNOSIS — I10 ESSENTIAL HYPERTENSION: ICD-10-CM

## 2025-07-15 DIAGNOSIS — I48.92 ATRIAL FLUTTER, UNSPECIFIED TYPE: Primary | ICD-10-CM

## 2025-07-15 LAB — INR PPP: 2.2 (ref 0.9–1.1)

## 2025-07-15 PROCEDURE — 85610 PROTHROMBIN TIME: CPT | Performed by: INTERNAL MEDICINE

## 2025-07-15 PROCEDURE — 36416 COLLJ CAPILLARY BLOOD SPEC: CPT | Performed by: INTERNAL MEDICINE

## 2025-07-15 NOTE — PROGRESS NOTES
Patient's INR- 2.2., within therapeutic range. Continue current dosage and recheck in 1 month. adamLPN

## 2025-07-15 NOTE — PROGRESS NOTES
Encounter Date:07/15/2025  Last seen 1/15/2025       Patient ID: Henok Beasley is a 89 y.o. male.    Chief Complaint:  Tachycardia-bradycardia syndrome  History of atrial flutter.  History of sinus bradycardia  Elevated blood pressure.  Incomplete right bundle branch block        History of Present Illness  Since I have last seen, the patient has been without any chest discomfort ,shortness of breath, palpitations, dizziness or syncope.  Denies having any headache ,abdominal pain ,nausea, vomiting , diarrhea constipation, loss of weight or loss of appetite.  Denies having any excessive bruising ,hematuria or blood in the stool.    Review of all systems negative except as indicated.    Reviewed ROS.    Assessment and Plan         [[[[[[[[[[[[[[[[[[[[[[[[  History  ====================  - Tachy-ollis syndrome.  History of atrial flutter.  Patient was maintaining sinus rhythm  Patient is in atrial flutter 1/31/2023            Atrial flutter 3/9/2023  Atrial flutter-6/12/2023  EKG 7/9/2024-atrial flutter     Status post electrical cardioversion and patient had significant sinus pauses after cardioversion in May of 2006. Patient had significant  sinus bradycardia and syncope and Toprol was discontinued with improvement of symptoms.      Echocardiogram 3/9/2023 revealed   Biatrial enlargement.  Concentric left ventricle hypertrophy.  Right ventricle enlargement.  Structurally and functionally normal cardiac valves.  Left ventricular size and contractility is normal with ejection fraction of 60%.      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  Minimal mitral and aortic regurgitation     - Chronic and incomplete right bundle-branch block       -Elevated blood pressure     - Molina's esophagus.      -History of allergy to horse serum      -Status post hernia repair and right knee surgery.     ====================  Plan  =================  Tachybradycardia syndrome.  History of atrial flutter.  Patient  was maintaining sinus rhythm  Patient was in atrial flutter 1/31/2023            Atrial flutter-EKG- 2/15/2023  EKG 3/9/2023-atrial flutter with controlled ventricular response  Atrial flutter with controlled ventricular response-EKG-6/12/2023  EKG 1/2/2024-atrial flutter with controlled ventricular response.  EKG 7/9/2024-atrial flutter with controlled ventricular response.  EKG 1/15/2025-atrial flutter with controlled ventricular response right bundle branch block.  EKG 7/15/2025 atrial flutter right bundle branch block     Status post electrical cardioversion and patient had significant sinus pauses after cardioversion in May of 2006.  Patient had significant  sinus bradycardia and syncope and Toprol was discontinued with improvement of symptoms.     Continue on Metroprolol tartrate 12.5 mg once a day.  Observe for bradycardia.     Chronic and incomplete right bundle branch block stable and asymptomatic     Hypertension  126/98.  Continue metoprolol.     Patient is not having any angina pectoris or congestive heart failure.  Anticoagulation status reviewed.  PT/INR on a monthly basis.  INR today 2.2.  Continue Coumadin.  Observe for toxic effects.     Options were discussed regarding management of atrial flutter..  Continuation of anticoagulation and observation since patient is asymptomatic-patient's preference.  Consideration of treatment and observation unless patient develops symptoms.    Medications were reviewed and updated.    Follow-up in office in 6 months.    Further plan will depend on patient's progress.    Reviewed and updated 7/15/2025.    [[[[[[[[[[[[[[[[[[[[[[[[[[[[[[[[[[          Diagnosis Plan   1. Long term (current) use of anticoagulants        2. Atrial flutter, unspecified type        3. Essential hypertension        4. Atrial flutter with controlled response        5. Tachycardia-bradycardia        6. Bradycardia, sinus        7. Palpitations        LAB RESULTS (LAST 7 DAYS)    CBC         BMP        CMP         BNP        TROPONIN        CoAg  Results from last 7 days   Lab Units 07/15/25  1038   INR  2.20*       Creatinine Clearance  CrCl cannot be calculated (Patient's most recent lab result is older than the maximum 30 days allowed.).    ABG        Radiology  No radiology results for the last day                The following portions of the patient's history were reviewed and updated as appropriate: allergies, current medications, past family history, past medical history, past social history, past surgical history, and problem list.    Review of Systems   Constitutional: Negative for malaise/fatigue.   Cardiovascular:  Negative for chest pain, leg swelling, palpitations and syncope.   Respiratory:  Negative for shortness of breath.    Skin:  Negative for rash.   Gastrointestinal:  Negative for nausea and vomiting.   Neurological:  Negative for dizziness, light-headedness and numbness.   All other systems reviewed and are negative.        Current Outpatient Medications:     amLODIPine (NORVASC) 2.5 MG tablet, Take 1 tablet by mouth Daily., Disp: 90 tablet, Rfl: 1    Ginkgo Biloba 120 MG tablet, GINKOBA TABS, Disp: , Rfl:     Glucosamine-Chondroitin 250-200 MG tablet, [facility] GLUCOSAMINE-CHONDROITIN TABS, Disp: , Rfl:     metoprolol tartrate (LOPRESSOR) 25 MG tablet, Take 0.5 tablets by mouth Daily., Disp: 45 tablet, Rfl: 2    MULTIPLE VITAMIN-FOLIC ACID PO, MULTIVITAMINS TABS, Disp: , Rfl:     pantoprazole (PROTONIX) 40 MG EC tablet, Take 1 tablet by mouth 2 (Two) Times a Day., Disp: 180 tablet, Rfl: 3    tamsulosin (FLOMAX) 0.4 MG capsule 24 hr capsule, TAKE 1 CAPSULE BY MOUTH DAILY, Disp: 90 capsule, Rfl: 3    warfarin (COUMADIN) 5 MG tablet, TAKE 0.5 TABLET BY MOUTH ON SUNDAYS, TUESDAYS AND THURSDAYS AND TAKE 1 TABLET BY MOUTH ON ALL OTHER DAYS, Disp: 90 tablet, Rfl: 2    Allergies   Allergen Reactions    Other Rash     Horse Serum       Family History   Problem Relation Age of Onset    Cancer  Mother     Lung cancer Mother         smoker    Early death Mother         Lung cancer    Miscarriages / Stillbirths Mother     Hyperlipidemia Father     Anuerysm Father         AAA- smoker    Early death Father         AAA    Heart failure Sister         Hypoplastic right coronary artery    Anuerysm Paternal Uncle         AAA    Heart attack Daughter     Early death Daughter         Hypoplastic coronary artery    Early death Son         Auto collision    Hyperlipidemia Other     Aneurysm Other         AAA    Hyperlipidemia Other     Stroke Other     Coronary artery disease Other     Colon cancer Neg Hx     Colon polyps Neg Hx     Crohn's disease Neg Hx     Irritable bowel syndrome Neg Hx     Ulcerative colitis Neg Hx        Past Surgical History:   Procedure Laterality Date    ADENOIDECTOMY  1939    CARDIOVERSION      CATARACT EXTRACTION      COLONOSCOPY  2017    ENDOSCOPY N/A 11/29/2024    Procedure: ESOPHAGOGASTRODUODENOSCOPY with cold forcep biopsies, dilation of schatzki's ring;  Surgeon: Zev Farmer MD;  Location: [facility] ENDOSCOPY;  Service: Gastroenterology;  Laterality: N/A;  pre: GERD, dysphagia,   post:schatzki's ring,  esophageal ulcer, antrum gastritis, small hiatal hernia    HERNIA REPAIR Right 1967, 2013    R inguinal    KNEE SURGERY Right 1985    R meniscus repair    MASTOIDECTOMY  1942    MOHS SURGERY      TONSILLECTOMY      UPPER GASTROINTESTINAL ENDOSCOPY      VASECTOMY  1970       Past Medical History:   Diagnosis Date    Abnormal ECG 1984    Anesthesia complication     pt stated he had cardiac arrest during knee surgery    Atrial fibrillation     Atrial flutter     S/P CARDIOVERSION    Molina esophagus 2025    BPH without urinary obstruction     Cataract 2007    DDD (degenerative disc disease), lumbar     ED (erectile dysfunction) of organic origin     Erectile dysfunction 2009    GERD (gastroesophageal reflux disease)     H/O tachycardia-bradycardia syndrome     Hypertension 2023     Left ventricular hypertrophy     Memory loss     Osteoarthritis     Peptic ulceration 2025    Prostate cancer 2010    radioactive seeds placed, in remission    RBBB (right bundle branch block)     Scoliosis 2025    Skin cancer     H/O SKIN CANCER    Spinal stenosis, lumbar     Tachycardia     From Long term Covid       Family History   Problem Relation Age of Onset    Cancer Mother     Lung cancer Mother         smoker    Early death Mother         Lung cancer    Miscarriages / Stillbirths Mother     Hyperlipidemia Father     Anuerysm Father         AAA- smoker    Early death Father         AAA    Heart failure Sister         Hypoplastic right coronary artery    Anuerysm Paternal Uncle         AAA    Heart attack Daughter     Early death Daughter         Hypoplastic coronary artery    Early death Son         Auto collision    Hyperlipidemia Other     Aneurysm Other         AAA    Hyperlipidemia Other     Stroke Other     Coronary artery disease Other     Colon cancer Neg Hx     Colon polyps Neg Hx     Crohn's disease Neg Hx     Irritable bowel syndrome Neg Hx     Ulcerative colitis Neg Hx        Social History     Socioeconomic History    Marital status:    Tobacco Use    Smoking status: Never     Passive exposure: Never    Smokeless tobacco: Never   Vaping Use    Vaping status: Never Used   Substance and Sexual Activity    Alcohol use: Yes     Alcohol/week: 6.0 standard drinks of alcohol     Types: 6 Shots of liquor per week     Comment: 5-6 times per week    Drug use: Never    Sexual activity: Yes     Partners: Female     Birth control/protection: Post-menopausal, Vasectomy           ECG 12 Lead    Date/Time: 7/15/2025 10:58 AM  Performed by: Calvin Anderson MD    Authorized by: Calvin Anderson MD  Comparison: compared with previous ECG   Similar to previous ECG  Comparison to previous ECG: Atrial flutter with controlled ventricular response 55/min nonspecific ST-T wave abnormalities normal axis no  "ectopy no significant change from previous EKG.        Objective:       Physical Exam    /98   Pulse 59   Ht 165.1 cm (65\")   Wt 60.3 kg (133 lb)   SpO2 100%   BMI 22.13 kg/m²   The patient is alert, oriented and in no distress.    Vital signs as noted above.    Head and neck revealed no carotid bruits or jugular venous distension.  No thyromegaly or lymphadenopathy is present.    Lungs clear.  No wheezing.  Breath sounds are normal bilaterally.    Heart normal first and second heart sounds.  No murmur..  No pericardial rub is present.  No gallop is present.    Abdomen soft and nontender.  No organomegaly is present.    Extremities revealed good peripheral pulses without any pedal edema.    Skin warm and dry.    Musculoskeletal system is grossly normal.    CNS grossly normal.    Reviewed and updated.          "

## 2025-08-26 ENCOUNTER — ANTICOAGULATION VISIT (OUTPATIENT)
Dept: CARDIOLOGY | Facility: CLINIC | Age: 89
End: 2025-08-26
Payer: MEDICARE

## 2025-08-26 VITALS
HEART RATE: 71 BPM | BODY MASS INDEX: 22.47 KG/M2 | WEIGHT: 135 LBS | DIASTOLIC BLOOD PRESSURE: 88 MMHG | SYSTOLIC BLOOD PRESSURE: 125 MMHG

## 2025-08-26 DIAGNOSIS — I48.92 ATRIAL FLUTTER, UNSPECIFIED TYPE: Primary | ICD-10-CM

## 2025-08-26 DIAGNOSIS — Z79.01 LONG TERM (CURRENT) USE OF ANTICOAGULANTS: ICD-10-CM

## 2025-08-26 LAB — INR PPP: 2.3 (ref 2–3)

## 2025-08-26 PROCEDURE — 85610 PROTHROMBIN TIME: CPT | Performed by: INTERNAL MEDICINE

## 2025-08-26 PROCEDURE — 36416 COLLJ CAPILLARY BLOOD SPEC: CPT | Performed by: INTERNAL MEDICINE

## (undated) DEVICE — ADAPT CLN BIOGUARD AIR/H2O DISP

## (undated) DEVICE — DEV INFL CRE STERIFLATE 60CC DISP

## (undated) DEVICE — FRCP BX RADJAW4 NDL 2.8 240CM LG OG BX40

## (undated) DEVICE — KT ORCA ORCAPOD DISP STRL

## (undated) DEVICE — BLCK/BITE BLOX W/DENTL/RIM W/STRAP 54F

## (undated) DEVICE — LN SMPL CO2 SHTRM SD STREAM W/M LUER

## (undated) DEVICE — ESOPHAGEAL BALLOON DILATATION CATHETER: Brand: CRE FIXED WIRE

## (undated) DEVICE — SENSR O2 OXIMAX FNGR A/ 18IN NONSTR

## (undated) DEVICE — TUBING, SUCTION, 1/4" X 10', STRAIGHT: Brand: MEDLINE

## (undated) DEVICE — CANN O2 ETCO2 FITS ALL CONN CO2 SMPL A/ 7IN DISP LF